# Patient Record
Sex: MALE | Race: BLACK OR AFRICAN AMERICAN | Employment: OTHER | URBAN - METROPOLITAN AREA
[De-identification: names, ages, dates, MRNs, and addresses within clinical notes are randomized per-mention and may not be internally consistent; named-entity substitution may affect disease eponyms.]

---

## 2017-02-10 ENCOUNTER — HOSPITAL ENCOUNTER (OUTPATIENT)
Age: 52
Discharge: HOME | End: 2017-02-10
Payer: MEDICARE

## 2017-02-10 ENCOUNTER — HOSPITAL ENCOUNTER (OUTPATIENT)
Dept: GENERAL RADIOLOGY | Age: 52
Discharge: HOME | End: 2017-02-10
Payer: MEDICARE

## 2017-02-10 ENCOUNTER — OFFICE VISIT (OUTPATIENT)
Dept: INTERNAL MEDICINE | Facility: CLINIC | Age: 52
End: 2017-02-10

## 2017-02-10 VITALS
HEART RATE: 62 BPM | WEIGHT: 204 LBS | DIASTOLIC BLOOD PRESSURE: 96 MMHG | BODY MASS INDEX: 28.56 KG/M2 | SYSTOLIC BLOOD PRESSURE: 156 MMHG | HEIGHT: 71 IN

## 2017-02-10 DIAGNOSIS — I10 ESSENTIAL HYPERTENSION: ICD-10-CM

## 2017-02-10 DIAGNOSIS — F41.9 ANXIETY: ICD-10-CM

## 2017-02-10 DIAGNOSIS — I10 ESSENTIAL HYPERTENSION: Primary | ICD-10-CM

## 2017-02-10 DIAGNOSIS — M17.2 POST-TRAUMATIC OSTEOARTHRITIS OF BOTH KNEES: ICD-10-CM

## 2017-02-10 PROCEDURE — 71020 XR CHEST STANDARD TWO VW: CPT | Performed by: RADIOLOGY

## 2017-02-10 PROCEDURE — 71020 XR CHEST STANDARD TWO VW: CPT

## 2017-02-10 PROCEDURE — G8427 DOCREV CUR MEDS BY ELIG CLIN: HCPCS | Performed by: INTERNAL MEDICINE

## 2017-02-10 PROCEDURE — 4004F PT TOBACCO SCREEN RCVD TLK: CPT | Performed by: INTERNAL MEDICINE

## 2017-02-10 PROCEDURE — 99213 OFFICE O/P EST LOW 20 MIN: CPT | Performed by: INTERNAL MEDICINE

## 2017-02-10 PROCEDURE — G8484 FLU IMMUNIZE NO ADMIN: HCPCS | Performed by: INTERNAL MEDICINE

## 2017-02-10 PROCEDURE — G0444 DEPRESSION SCREEN ANNUAL: HCPCS | Performed by: INTERNAL MEDICINE

## 2017-02-10 PROCEDURE — G8419 CALC BMI OUT NRM PARAM NOF/U: HCPCS | Performed by: INTERNAL MEDICINE

## 2017-02-10 RX ORDER — TRIAMCINOLONE ACETONIDE 0.25 MG/G
CREAM TOPICAL
Qty: 60 G | Refills: 2 | Status: SHIPPED | OUTPATIENT
Start: 2017-02-10 | End: 2017-05-12 | Stop reason: SDUPTHER

## 2017-02-10 RX ORDER — HYDROXYZINE HYDROCHLORIDE 25 MG/1
TABLET, FILM COATED ORAL
Qty: 40 TABLET | Refills: 11 | Status: SHIPPED | OUTPATIENT
Start: 2017-02-10 | End: 2017-06-28 | Stop reason: ALTCHOICE

## 2017-02-10 RX ORDER — BLOOD PRESSURE TEST KIT
KIT MISCELLANEOUS
Qty: 1 KIT | Refills: 0 | Status: SHIPPED | OUTPATIENT
Start: 2017-02-10 | End: 2017-05-12 | Stop reason: SDUPTHER

## 2017-02-10 RX ORDER — TRAMADOL HYDROCHLORIDE 50 MG/1
TABLET ORAL
Qty: 50 TABLET | Refills: 2 | Status: SHIPPED | OUTPATIENT
Start: 2017-02-10 | End: 2017-05-12 | Stop reason: SDUPTHER

## 2017-02-10 RX ORDER — AMLODIPINE BESYLATE 10 MG/1
TABLET ORAL
Qty: 30 TABLET | Refills: 11 | Status: SHIPPED | OUTPATIENT
Start: 2017-02-10 | End: 2017-06-28 | Stop reason: ALTCHOICE

## 2017-02-10 RX ORDER — HYDROCHLOROTHIAZIDE 25 MG/1
TABLET ORAL
Qty: 30 TABLET | Refills: 11 | Status: SHIPPED | OUTPATIENT
Start: 2017-02-10 | End: 2017-06-28 | Stop reason: ALTCHOICE

## 2017-02-10 RX ORDER — MULTIVITAMIN/IRON/FOLIC ACID 18MG-0.4MG
TABLET ORAL
Qty: 30 TABLET | Refills: 11 | Status: SHIPPED | OUTPATIENT
Start: 2017-02-10 | End: 2017-08-15 | Stop reason: SDUPTHER

## 2017-02-10 ASSESSMENT — ENCOUNTER SYMPTOMS
RESPIRATORY NEGATIVE: 1
GASTROINTESTINAL NEGATIVE: 1
BACK PAIN: 1
ALLERGIC/IMMUNOLOGIC NEGATIVE: 1
EYES NEGATIVE: 1

## 2017-02-10 ASSESSMENT — PATIENT HEALTH QUESTIONNAIRE - PHQ9
5. POOR APPETITE OR OVEREATING: 0
3. TROUBLE FALLING OR STAYING ASLEEP: 1
2. FEELING DOWN, DEPRESSED OR HOPELESS: 2
10. IF YOU CHECKED OFF ANY PROBLEMS, HOW DIFFICULT HAVE THESE PROBLEMS MADE IT FOR YOU TO DO YOUR WORK, TAKE CARE OF THINGS AT HOME, OR GET ALONG WITH OTHER PEOPLE: 1
1. LITTLE INTEREST OR PLEASURE IN DOING THINGS: 1
9. THOUGHTS THAT YOU WOULD BE BETTER OFF DEAD, OR OF HURTING YOURSELF: 0
SUM OF ALL RESPONSES TO PHQ9 QUESTIONS 1 & 2: 3
6. FEELING BAD ABOUT YOURSELF - OR THAT YOU ARE A FAILURE OR HAVE LET YOURSELF OR YOUR FAMILY DOWN: 1
SUM OF ALL RESPONSES TO PHQ QUESTIONS 1-9: 5
4. FEELING TIRED OR HAVING LITTLE ENERGY: 0
7. TROUBLE CONCENTRATING ON THINGS, SUCH AS READING THE NEWSPAPER OR WATCHING TELEVISION: 0

## 2017-02-16 RX ORDER — LEVOCETIRIZINE DIHYDROCHLORIDE 5 MG/1
TABLET, FILM COATED ORAL
Qty: 20 TABLET | Refills: 0 | Status: SHIPPED | OUTPATIENT
Start: 2017-02-16 | End: 2017-06-28 | Stop reason: ALTCHOICE

## 2017-05-12 ENCOUNTER — OFFICE VISIT (OUTPATIENT)
Dept: INTERNAL MEDICINE | Age: 52
End: 2017-05-12
Payer: COMMERCIAL

## 2017-05-12 VITALS
SYSTOLIC BLOOD PRESSURE: 147 MMHG | BODY MASS INDEX: 28.31 KG/M2 | DIASTOLIC BLOOD PRESSURE: 88 MMHG | WEIGHT: 203 LBS | RESPIRATION RATE: 18 BRPM | HEART RATE: 77 BPM

## 2017-05-12 DIAGNOSIS — R21 PENILE RASH: ICD-10-CM

## 2017-05-12 DIAGNOSIS — I10 ESSENTIAL HYPERTENSION: Primary | ICD-10-CM

## 2017-05-12 DIAGNOSIS — M17.2 POST-TRAUMATIC OSTEOARTHRITIS OF BOTH KNEES: ICD-10-CM

## 2017-05-12 PROCEDURE — 99213 OFFICE O/P EST LOW 20 MIN: CPT | Performed by: INTERNAL MEDICINE

## 2017-05-12 RX ORDER — TRIAMCINOLONE ACETONIDE 0.25 MG/G
CREAM TOPICAL
Qty: 60 G | Refills: 2 | Status: SHIPPED | OUTPATIENT
Start: 2017-05-12 | End: 2017-06-28 | Stop reason: ALTCHOICE

## 2017-05-12 RX ORDER — CLOTRIMAZOLE AND BETAMETHASONE DIPROPIONATE 10; .64 MG/G; MG/G
CREAM TOPICAL
Qty: 45 G | Refills: 0 | Status: SHIPPED | OUTPATIENT
Start: 2017-05-12 | End: 2017-06-28 | Stop reason: ALTCHOICE

## 2017-05-12 RX ORDER — LISINOPRIL 5 MG/1
5 TABLET ORAL DAILY
Qty: 30 TABLET | Refills: 11 | Status: SHIPPED | OUTPATIENT
Start: 2017-05-12 | End: 2017-06-28 | Stop reason: ALTCHOICE

## 2017-05-12 RX ORDER — BLOOD PRESSURE TEST KIT
KIT MISCELLANEOUS
Qty: 1 KIT | Refills: 0 | Status: SHIPPED | OUTPATIENT
Start: 2017-05-12 | End: 2018-08-31

## 2017-05-12 RX ORDER — TRAMADOL HYDROCHLORIDE 50 MG/1
TABLET ORAL
Qty: 50 TABLET | Refills: 2 | Status: SHIPPED | OUTPATIENT
Start: 2017-05-12 | End: 2017-06-28 | Stop reason: ALTCHOICE

## 2017-05-12 ASSESSMENT — ENCOUNTER SYMPTOMS
EYES NEGATIVE: 1
RESPIRATORY NEGATIVE: 1
GASTROINTESTINAL NEGATIVE: 1
ALLERGIC/IMMUNOLOGIC NEGATIVE: 1

## 2017-05-18 ENCOUNTER — TELEPHONE (OUTPATIENT)
Dept: INTERNAL MEDICINE | Age: 52
End: 2017-05-18

## 2017-06-28 ENCOUNTER — OFFICE VISIT (OUTPATIENT)
Dept: INTERNAL MEDICINE | Age: 52
End: 2017-06-28
Payer: COMMERCIAL

## 2017-06-28 VITALS
WEIGHT: 189 LBS | HEART RATE: 85 BPM | BODY MASS INDEX: 26.46 KG/M2 | SYSTOLIC BLOOD PRESSURE: 139 MMHG | DIASTOLIC BLOOD PRESSURE: 85 MMHG | HEIGHT: 71 IN | RESPIRATION RATE: 18 BRPM

## 2017-06-28 DIAGNOSIS — B17.9 ACUTE HEPATITIS: ICD-10-CM

## 2017-06-28 DIAGNOSIS — I82.0 HEPATIC VEIN THROMBOSIS (HCC): ICD-10-CM

## 2017-06-28 DIAGNOSIS — I10 ESSENTIAL HYPERTENSION: Primary | ICD-10-CM

## 2017-06-28 PROCEDURE — 99214 OFFICE O/P EST MOD 30 MIN: CPT | Performed by: INTERNAL MEDICINE

## 2017-06-28 RX ORDER — OMEPRAZOLE 20 MG/1
20 CAPSULE, DELAYED RELEASE ORAL DAILY
COMMUNITY
Start: 2017-06-21 | End: 2017-07-24 | Stop reason: SDUPTHER

## 2017-06-28 RX ORDER — LANOLIN ALCOHOL/MO/W.PET/CERES
100 CREAM (GRAM) TOPICAL DAILY
COMMUNITY
End: 2017-07-24 | Stop reason: SDUPTHER

## 2017-06-28 RX ORDER — FOLIC ACID 1 MG/1
1 TABLET ORAL DAILY
COMMUNITY
Start: 2017-06-21 | End: 2017-07-24 | Stop reason: SDUPTHER

## 2017-06-28 RX ORDER — TRIAMCINOLONE ACETONIDE 0.25 MG/G
CREAM TOPICAL
COMMUNITY
Start: 2017-03-24 | End: 2017-08-15 | Stop reason: SDUPTHER

## 2017-06-28 RX ORDER — WARFARIN SODIUM 2 MG/1
TABLET ORAL
COMMUNITY
Start: 2017-06-21 | End: 2017-07-24 | Stop reason: SDUPTHER

## 2017-06-28 ASSESSMENT — ENCOUNTER SYMPTOMS
GASTROINTESTINAL NEGATIVE: 1
ALLERGIC/IMMUNOLOGIC NEGATIVE: 1
SHORTNESS OF BREATH: 0
EYES NEGATIVE: 1

## 2017-07-21 ENCOUNTER — TELEPHONE (OUTPATIENT)
Dept: INPATIENT UNIT | Age: 52
End: 2017-07-21

## 2017-07-24 ENCOUNTER — TELEPHONE (OUTPATIENT)
Dept: INTERNAL MEDICINE | Age: 52
End: 2017-07-24

## 2017-07-24 RX ORDER — LANOLIN ALCOHOL/MO/W.PET/CERES
100 CREAM (GRAM) TOPICAL DAILY
Qty: 30 TABLET | Refills: 5 | Status: SHIPPED | OUTPATIENT
Start: 2017-07-24 | End: 2017-11-14 | Stop reason: SDUPTHER

## 2017-07-24 RX ORDER — FOLIC ACID 1 MG/1
1000 TABLET ORAL DAILY
Qty: 30 TABLET | Refills: 5 | Status: SHIPPED | OUTPATIENT
Start: 2017-07-24 | End: 2017-11-14 | Stop reason: SDUPTHER

## 2017-07-24 RX ORDER — WARFARIN SODIUM 2 MG/1
TABLET ORAL
Qty: 30 TABLET | Refills: 2 | Status: SHIPPED | OUTPATIENT
Start: 2017-07-24 | End: 2017-11-14 | Stop reason: ALTCHOICE

## 2017-07-24 RX ORDER — CLONIDINE HYDROCHLORIDE 0.2 MG/1
0.2 TABLET ORAL 2 TIMES DAILY
Qty: 60 TABLET | Refills: 3 | Status: SHIPPED | OUTPATIENT
Start: 2017-07-24 | End: 2017-08-15 | Stop reason: CLARIF

## 2017-07-24 RX ORDER — OMEPRAZOLE 20 MG/1
20 CAPSULE, DELAYED RELEASE ORAL DAILY
Qty: 30 CAPSULE | Refills: 5 | Status: SHIPPED | OUTPATIENT
Start: 2017-07-24 | End: 2017-11-14 | Stop reason: SDUPTHER

## 2017-07-26 ENCOUNTER — TELEPHONE (OUTPATIENT)
Dept: INTERNAL MEDICINE | Age: 52
End: 2017-07-26

## 2017-07-26 ENCOUNTER — HOSPITAL ENCOUNTER (OUTPATIENT)
Age: 52
Discharge: HOME | End: 2017-07-26
Payer: COMMERCIAL

## 2017-07-26 ENCOUNTER — HOSPITAL ENCOUNTER (OUTPATIENT)
Dept: GENERAL RADIOLOGY | Age: 52
Discharge: HOME | End: 2017-07-26
Payer: COMMERCIAL

## 2017-07-26 DIAGNOSIS — I82.0 HEPATIC VEIN THROMBOSIS (HCC): ICD-10-CM

## 2017-07-26 DIAGNOSIS — I10 ESSENTIAL HYPERTENSION: ICD-10-CM

## 2017-07-26 DIAGNOSIS — R91.1 PULMONARY NODULE: Primary | ICD-10-CM

## 2017-07-26 DIAGNOSIS — B17.9 ACUTE HEPATITIS: ICD-10-CM

## 2017-07-26 LAB
ABSOLUTE EOS #: 0.3 K/UL (ref 0–0.4)
ABSOLUTE LYMPH #: 3 K/UL (ref 1–4.8)
ABSOLUTE MONO #: 0.5 K/UL (ref 0.1–1.2)
ALBUMIN SERPL-MCNC: 4 G/DL (ref 3.5–5.2)
ALBUMIN/GLOBULIN RATIO: 0.9 (ref 1–2.5)
ALP BLD-CCNC: 130 U/L (ref 40–129)
ALP BLD-CCNC: 130 U/L (ref 40–129)
ALT SERPL-CCNC: 52 U/L (ref 5–41)
ALT SERPL-CCNC: 52 U/L (ref 5–41)
ANION GAP SERPL CALCULATED.3IONS-SCNC: 13 MMOL/L (ref 9–17)
ANION GAP SERPL CALCULATED.3IONS-SCNC: 13 MMOL/L (ref 9–17)
AST SERPL-CCNC: 43 U/L
AST SERPL-CCNC: 43 U/L
BASOPHILS # BLD: 1 %
BASOPHILS ABSOLUTE: 0 K/UL (ref 0–0.2)
BILIRUB SERPL-MCNC: 0.55 MG/DL (ref 0.3–1.2)
BUN BLDV-MCNC: 13 MG/DL (ref 6–20)
BUN BLDV-MCNC: 13 MG/DL (ref 6–20)
BUN/CREAT BLD: ABNORMAL (ref 9–20)
BUN/CREAT BLD: NORMAL (ref 9–20)
CALCIUM SERPL-MCNC: 8.9 MG/DL (ref 8.6–10.4)
CALCIUM SERPL-MCNC: 8.9 MG/DL (ref 8.6–10.4)
CHLORIDE BLD-SCNC: 99 MMOL/L (ref 98–107)
CHLORIDE BLD-SCNC: 99 MMOL/L (ref 98–107)
CO2: 23 MMOL/L (ref 20–31)
CO2: 23 MMOL/L (ref 20–31)
CREAT SERPL-MCNC: 0.86 MG/DL (ref 0.7–1.2)
CREAT SERPL-MCNC: 0.86 MG/DL (ref 0.7–1.2)
DIFFERENTIAL TYPE: ABNORMAL
EOSINOPHILS RELATIVE PERCENT: 4 %
GFR AFRICAN AMERICAN: >60 ML/MIN
GFR AFRICAN AMERICAN: >60 ML/MIN
GFR NON-AFRICAN AMERICAN: >60 ML/MIN
GFR NON-AFRICAN AMERICAN: >60 ML/MIN
GFR SERPL CREATININE-BSD FRML MDRD: ABNORMAL ML/MIN/{1.73_M2}
GFR SERPL CREATININE-BSD FRML MDRD: ABNORMAL ML/MIN/{1.73_M2}
GFR SERPL CREATININE-BSD FRML MDRD: NORMAL ML/MIN/{1.73_M2}
GFR SERPL CREATININE-BSD FRML MDRD: NORMAL ML/MIN/{1.73_M2}
GLUCOSE BLD-MCNC: 84 MG/DL (ref 70–99)
GLUCOSE BLD-MCNC: 84 MG/DL (ref 70–99)
HCT VFR BLD CALC: 37.3 % (ref 41–53)
HEMOGLOBIN: 12.6 G/DL (ref 13.5–17.5)
LYMPHOCYTES # BLD: 38 %
MCH RBC QN AUTO: 27.4 PG (ref 26–34)
MCHC RBC AUTO-ENTMCNC: 33.7 G/DL (ref 31–37)
MCV RBC AUTO: 81.3 FL (ref 80–100)
MONOCYTES # BLD: 7 %
PDW BLD-RTO: 15.3 % (ref 12.5–15.4)
PLATELET # BLD: 307 K/UL (ref 140–450)
PLATELET ESTIMATE: ABNORMAL
PMV BLD AUTO: 7.7 FL (ref 6–12)
POTASSIUM SERPL-SCNC: 4 MMOL/L (ref 3.7–5.3)
POTASSIUM SERPL-SCNC: 4 MMOL/L (ref 3.7–5.3)
RBC # BLD: 4.59 M/UL (ref 4.5–5.9)
RBC # BLD: ABNORMAL 10*6/UL
SEG NEUTROPHILS: 50 %
SEGMENTED NEUTROPHILS ABSOLUTE COUNT: 4 K/UL (ref 1.8–7.7)
SODIUM BLD-SCNC: 135 MMOL/L (ref 135–144)
SODIUM BLD-SCNC: 135 MMOL/L (ref 135–144)
TOTAL PROTEIN: 8.4 G/DL (ref 6.4–8.3)
WBC # BLD: 7.8 K/UL (ref 3.5–11)
WBC # BLD: ABNORMAL 10*3/UL

## 2017-07-26 PROCEDURE — 71020 XR CHEST STANDARD TWO VW: CPT

## 2017-07-26 PROCEDURE — 85025 COMPLETE CBC W/AUTO DIFF WBC: CPT

## 2017-07-26 PROCEDURE — 84460 ALANINE AMINO (ALT) (SGPT): CPT

## 2017-07-26 PROCEDURE — 36415 COLL VENOUS BLD VENIPUNCTURE: CPT

## 2017-07-26 PROCEDURE — 84075 ASSAY ALKALINE PHOSPHATASE: CPT

## 2017-07-26 PROCEDURE — 84450 TRANSFERASE (AST) (SGOT): CPT

## 2017-07-26 PROCEDURE — 80053 COMPREHEN METABOLIC PANEL: CPT

## 2017-07-26 PROCEDURE — 80048 BASIC METABOLIC PNL TOTAL CA: CPT

## 2017-07-28 ENCOUNTER — TELEPHONE (OUTPATIENT)
Dept: INTERNAL MEDICINE | Age: 52
End: 2017-07-28

## 2017-08-03 LAB
INR BLD: 1.69 (ref 0.86–1.15)
PROTHROMBIN TIME: 18.2 SECONDS (ref 9.3–12.3)

## 2017-08-07 ENCOUNTER — HOSPITAL ENCOUNTER (OUTPATIENT)
Dept: CT IMAGING | Age: 52
Discharge: HOME | End: 2017-08-07

## 2017-08-07 DIAGNOSIS — K75.0 LIVER ABSCESS: ICD-10-CM

## 2017-08-07 DIAGNOSIS — R91.1 PULMONARY NODULE: ICD-10-CM

## 2017-08-07 PROCEDURE — 6360000004 HC RX CONTRAST MEDICATION: Performed by: PHYSICIAN ASSISTANT

## 2017-08-07 PROCEDURE — 74170 CT ABD WO CNTRST FLWD CNTRST: CPT

## 2017-08-07 PROCEDURE — 71250 CT THORAX DX C-: CPT

## 2017-08-07 RX ADMIN — IOVERSOL 100 ML: 741 INJECTION INTRA-ARTERIAL; INTRAVENOUS at 13:35

## 2017-08-11 LAB
INR BLD: 2.42 (ref 0.86–1.15)
PROTHROMBIN TIME: 26.4 SECONDS (ref 9.3–12.3)

## 2017-08-15 ENCOUNTER — OFFICE VISIT (OUTPATIENT)
Dept: INTERNAL MEDICINE | Age: 52
End: 2017-08-15
Payer: COMMERCIAL

## 2017-08-15 VITALS
BODY MASS INDEX: 26.93 KG/M2 | HEIGHT: 72 IN | WEIGHT: 198.8 LBS | SYSTOLIC BLOOD PRESSURE: 138 MMHG | DIASTOLIC BLOOD PRESSURE: 83 MMHG | HEART RATE: 73 BPM

## 2017-08-15 DIAGNOSIS — I81 PORTAL VEIN THROMBOSIS: ICD-10-CM

## 2017-08-15 DIAGNOSIS — Z23 NEED FOR PNEUMOCOCCAL VACCINATION: ICD-10-CM

## 2017-08-15 DIAGNOSIS — I10 ESSENTIAL HYPERTENSION: Primary | ICD-10-CM

## 2017-08-15 PROCEDURE — G0009 ADMIN PNEUMOCOCCAL VACCINE: HCPCS | Performed by: INTERNAL MEDICINE

## 2017-08-15 PROCEDURE — 99213 OFFICE O/P EST LOW 20 MIN: CPT | Performed by: INTERNAL MEDICINE

## 2017-08-15 PROCEDURE — 90732 PPSV23 VACC 2 YRS+ SUBQ/IM: CPT | Performed by: INTERNAL MEDICINE

## 2017-08-15 RX ORDER — MULTIVITAMIN/IRON/FOLIC ACID 18MG-0.4MG
TABLET ORAL
Qty: 30 TABLET | Refills: 11 | Status: SHIPPED | OUTPATIENT
Start: 2017-08-15 | End: 2017-11-14 | Stop reason: SDUPTHER

## 2017-08-15 RX ORDER — TRAMADOL HYDROCHLORIDE 50 MG/1
50 TABLET ORAL
COMMUNITY
Start: 2017-07-21 | End: 2017-08-15 | Stop reason: SDUPTHER

## 2017-08-15 RX ORDER — TRIAMCINOLONE ACETONIDE 0.25 MG/G
CREAM TOPICAL
Qty: 60 G | Refills: 2 | Status: SHIPPED | OUTPATIENT
Start: 2017-08-15 | End: 2017-11-14 | Stop reason: SDUPTHER

## 2017-08-15 RX ORDER — TRAMADOL HYDROCHLORIDE 50 MG/1
50 TABLET ORAL EVERY 8 HOURS PRN
Qty: 30 TABLET | Refills: 0 | Status: SHIPPED | OUTPATIENT
Start: 2017-08-15 | End: 2017-10-21 | Stop reason: SDUPTHER

## 2017-08-15 ASSESSMENT — ENCOUNTER SYMPTOMS
EYES NEGATIVE: 1
RESPIRATORY NEGATIVE: 1
ALLERGIC/IMMUNOLOGIC NEGATIVE: 1
GASTROINTESTINAL NEGATIVE: 1

## 2017-08-25 LAB
INR BLD: 2.16 (ref 0.86–1.15)
PROTHROMBIN TIME: 23.5 SECONDS (ref 9.3–12.3)

## 2017-10-02 ENCOUNTER — TELEPHONE (OUTPATIENT)
Dept: INTERNAL MEDICINE | Age: 52
End: 2017-10-02

## 2017-10-13 LAB
INR BLD: 2.49 (ref 0.86–1.15)
PROTHROMBIN TIME: 27.1 SECONDS (ref 9.3–12.3)

## 2017-10-23 RX ORDER — TRAMADOL HYDROCHLORIDE 50 MG/1
TABLET ORAL
Qty: 30 TABLET | Refills: 0 | OUTPATIENT
Start: 2017-10-23 | End: 2017-11-14 | Stop reason: SDUPTHER

## 2017-10-23 NOTE — TELEPHONE ENCOUNTER
PC to [facility]--left VM for Tramadol 50 mg #30 with no refills. Take 1 tablet q 8 hours prn pain. Will cause drowsiness. Do not drive if taking.

## 2017-11-14 ENCOUNTER — OFFICE VISIT (OUTPATIENT)
Dept: INTERNAL MEDICINE | Age: 52
End: 2017-11-14
Payer: COMMERCIAL

## 2017-11-14 VITALS
DIASTOLIC BLOOD PRESSURE: 97 MMHG | HEART RATE: 77 BPM | SYSTOLIC BLOOD PRESSURE: 165 MMHG | BODY MASS INDEX: 28.77 KG/M2 | HEIGHT: 72 IN | WEIGHT: 212.4 LBS

## 2017-11-14 DIAGNOSIS — I10 ESSENTIAL HYPERTENSION: ICD-10-CM

## 2017-11-14 PROCEDURE — G8484 FLU IMMUNIZE NO ADMIN: HCPCS | Performed by: INTERNAL MEDICINE

## 2017-11-14 PROCEDURE — 4004F PT TOBACCO SCREEN RCVD TLK: CPT | Performed by: INTERNAL MEDICINE

## 2017-11-14 PROCEDURE — G8417 CALC BMI ABV UP PARAM F/U: HCPCS | Performed by: INTERNAL MEDICINE

## 2017-11-14 PROCEDURE — 99214 OFFICE O/P EST MOD 30 MIN: CPT | Performed by: INTERNAL MEDICINE

## 2017-11-14 PROCEDURE — G8427 DOCREV CUR MEDS BY ELIG CLIN: HCPCS | Performed by: INTERNAL MEDICINE

## 2017-11-14 RX ORDER — LANOLIN ALCOHOL/MO/W.PET/CERES
100 CREAM (GRAM) TOPICAL DAILY
Qty: 30 TABLET | Refills: 5 | Status: SHIPPED | OUTPATIENT
Start: 2017-11-14 | End: 2018-04-30 | Stop reason: SDUPTHER

## 2017-11-14 RX ORDER — OMEPRAZOLE 20 MG/1
20 CAPSULE, DELAYED RELEASE ORAL DAILY
Qty: 30 CAPSULE | Refills: 5 | Status: SHIPPED | OUTPATIENT
Start: 2017-11-14 | End: 2018-04-30 | Stop reason: SDUPTHER

## 2017-11-14 RX ORDER — TRAMADOL HYDROCHLORIDE 50 MG/1
TABLET ORAL
Qty: 30 TABLET | Refills: 0 | Status: SHIPPED | OUTPATIENT
Start: 2017-11-14 | End: 2017-12-06

## 2017-11-14 RX ORDER — TRIAMCINOLONE ACETONIDE 0.25 MG/G
CREAM TOPICAL
Qty: 60 G | Refills: 2 | Status: SHIPPED | OUTPATIENT
Start: 2017-11-14 | End: 2018-04-30 | Stop reason: SDUPTHER

## 2017-11-14 RX ORDER — FOLIC ACID 1 MG/1
1000 TABLET ORAL DAILY
Qty: 30 TABLET | Refills: 5 | Status: SHIPPED | OUTPATIENT
Start: 2017-11-14 | End: 2018-04-30 | Stop reason: SDUPTHER

## 2017-11-14 RX ORDER — MULTIVITAMIN/IRON/FOLIC ACID 18MG-0.4MG
TABLET ORAL
Qty: 30 TABLET | Refills: 11 | Status: SHIPPED | OUTPATIENT
Start: 2017-11-14 | End: 2020-05-12

## 2017-11-14 ASSESSMENT — ENCOUNTER SYMPTOMS
EYES NEGATIVE: 1
BACK PAIN: 1
RESPIRATORY NEGATIVE: 1
ALLERGIC/IMMUNOLOGIC NEGATIVE: 1
GASTROINTESTINAL NEGATIVE: 1

## 2017-11-14 NOTE — PATIENT INSTRUCTIONS
Printed script for Tramadol signed and given to pt. Return To Clinic 12/6/2017. After Visit Summary  given and reviewed. --MA    It is very important for your care that you keep your appointment. If for some reason you are unable to keep your appointment it is equally important that you call our office at [phone] to cancel your appointment and reschedule. Failure to do so may result in your termination from our practice.

## 2017-11-14 NOTE — PROGRESS NOTES
Visit Information    Have you changed or started any medications since your last visit including any over-the-counter medicines, vitamins, or herbal medicines? no   Are you having any side effects from any of your medications? -  no  Have you stopped taking any of your medications? Is so, why? -  no    Have you seen any other physician or provider since your last visit? Yes - Records Obtained  Have you had any other diagnostic tests since your last visit? Yes - Records Obtained  Have you been seen in the emergency room and/or had an admission to a hospital since we last saw you? No  Have you had your routine dental cleaning in the past 6 months? no    Have you activated your [employer] account? If not, what are your barriers?  Yes     Patient Care Team:  Jennifer Puri MD as PCP - General (Internal Medicine)  Keon Gayle MD as Consulting Physician (Vascular Surgery)  Evelina Bhatia MD as Surgeon (General Surgery: [facility])  Bhaskar Nicole MD as Consulting Physician (Infectious Diseases)    Medical History Review  Past Medical, Family, and Social History reviewed and does contribute to the patient presenting condition    Health Maintenance   Topic Date Due    HIV screen  11/27/1982    DTaP/Tdap/Td vaccine (1 - Tdap) 11/27/1986    Flu vaccine (1) 09/01/2017    Lipid screen  07/22/2021    Pneumococcal med risk  Completed

## 2017-11-14 NOTE — PROGRESS NOTES
Subjective:      Patient ID: Zayda Sosa Sr. is a 54 y.o. male. Ongoing arthralgias. Tolerating minimal/limited work. Off coumadin. Review of Systems   Constitutional: Negative. HENT: Negative. Eyes: Negative. Respiratory: Negative. Cardiovascular: Negative. Gastrointestinal: Negative. Endocrine: Negative. Genitourinary: Negative. Musculoskeletal: Positive for arthralgias, back pain, gait problem, joint swelling and myalgias. Skin: Negative. Allergic/Immunologic: Negative. Hematological: Negative. Psychiatric/Behavioral: Negative. Objective:   Physical Exam   Constitutional: He is oriented to person, place, and time. He appears well-developed and well-nourished. No distress. HENT:   Head: Normocephalic and atraumatic. Eyes: Conjunctivae and EOM are normal. Pupils are equal, round, and reactive to light. Neck: Normal range of motion. Neck supple. Abdominal: Soft. Bowel sounds are normal.   Musculoskeletal: He exhibits tenderness (paralumbar bilat. SLR postitive bilat at 60 degrees. ). Neurological: He is alert and oriented to person, place, and time. He has normal reflexes. Skin: Skin is warm and dry. He is not diaphoretic. Assessment:      Sp portal vein thrombosis  DJD of knees and hips.    Chronic low back pain  May be able to tolerate minimal sedentary work  Patient Active Problem List   Diagnosis    Hypertension    Sickle cell trait ([facility] [address]    Osteoarthritis of knee    Tibialis posterior tendonitis    Portal vein thrombosis           Plan:      Paperwork completed  Med list  Ret 3 mo

## 2017-12-06 ENCOUNTER — HOSPITAL ENCOUNTER (OUTPATIENT)
Age: 52
Setting detail: SPECIMEN
Discharge: HOME | End: 2017-12-06
Payer: COMMERCIAL

## 2017-12-06 ENCOUNTER — OFFICE VISIT (OUTPATIENT)
Dept: INTERNAL MEDICINE | Age: 52
End: 2017-12-06
Payer: COMMERCIAL

## 2017-12-06 VITALS
SYSTOLIC BLOOD PRESSURE: 130 MMHG | HEART RATE: 78 BPM | HEIGHT: 72 IN | DIASTOLIC BLOOD PRESSURE: 70 MMHG | WEIGHT: 217.4 LBS | BODY MASS INDEX: 29.45 KG/M2

## 2017-12-06 DIAGNOSIS — Z72.89 OTHER PROBLEMS RELATED TO LIFESTYLE: ICD-10-CM

## 2017-12-06 DIAGNOSIS — M17.0 PRIMARY OSTEOARTHRITIS OF BOTH KNEES: Primary | ICD-10-CM

## 2017-12-06 DIAGNOSIS — Z00.00 ROUTINE GENERAL MEDICAL EXAMINATION AT A HEALTH CARE FACILITY: ICD-10-CM

## 2017-12-06 PROCEDURE — G0438 PPPS, INITIAL VISIT: HCPCS | Performed by: INTERNAL MEDICINE

## 2017-12-06 RX ORDER — TRAMADOL HYDROCHLORIDE 50 MG/1
50 TABLET ORAL DAILY PRN
Qty: 30 TABLET | Refills: 0 | Status: SHIPPED | OUTPATIENT
Start: 2017-12-06 | End: 2018-04-19 | Stop reason: SDUPTHER

## 2017-12-06 ASSESSMENT — LIFESTYLE VARIABLES
HOW OFTEN DURING THE LAST YEAR HAVE YOU FOUND THAT YOU WERE NOT ABLE TO STOP DRINKING ONCE YOU HAD STARTED: 0
HAS A RELATIVE, FRIEND, DOCTOR, OR ANOTHER HEALTH PROFESSIONAL EXPRESSED CONCERN ABOUT YOUR DRINKING OR SUGGESTED YOU CUT DOWN: 0
AUDIT-C TOTAL SCORE: 3
HAVE YOU OR SOMEONE ELSE BEEN INJURED AS A RESULT OF YOUR DRINKING: 0
AUDIT TOTAL SCORE: 3
HOW OFTEN DURING THE LAST YEAR HAVE YOU NEEDED AN ALCOHOLIC DRINK FIRST THING IN THE MORNING TO GET YOURSELF GOING AFTER A NIGHT OF HEAVY DRINKING: 0
HOW MANY STANDARD DRINKS CONTAINING ALCOHOL DO YOU HAVE ON A TYPICAL DAY: 0
HOW OFTEN DO YOU HAVE A DRINK CONTAINING ALCOHOL: 2
HOW OFTEN DURING THE LAST YEAR HAVE YOU FAILED TO DO WHAT WAS NORMALLY EXPECTED FROM YOU BECAUSE OF DRINKING: 0
HOW OFTEN DURING THE LAST YEAR HAVE YOU HAD A FEELING OF GUILT OR REMORSE AFTER DRINKING: 0
HOW OFTEN DO YOU HAVE SIX OR MORE DRINKS ON ONE OCCASION: 1
HOW OFTEN DURING THE LAST YEAR HAVE YOU BEEN UNABLE TO REMEMBER WHAT HAPPENED THE NIGHT BEFORE BECAUSE YOU HAD BEEN DRINKING: 0

## 2017-12-06 ASSESSMENT — PATIENT HEALTH QUESTIONNAIRE - PHQ9: SUM OF ALL RESPONSES TO PHQ QUESTIONS 1-9: 0

## 2017-12-06 ASSESSMENT — ANXIETY QUESTIONNAIRES: GAD7 TOTAL SCORE: 1

## 2017-12-06 NOTE — PROGRESS NOTES
Chace Mark MD as PCP - General (Internal Medicine)  Enedelia Hudson MD as Consulting Physician (Vascular Surgery)  Yosi Leo MD as Surgeon (General Surgery: [facility])  Paula Tinoco MD as Consulting Physician (Infectious Diseases)    Wt Readings from Last 3 Encounters:   12/06/17 217 lb 6.4 oz (98.6 kg)   11/14/17 212 lb 6.4 oz (96.3 kg)   08/15/17 198 lb 12.8 oz (90.2 kg)     Vitals:    12/06/17 1350   BP: (!) 157/93   Site: Right Arm   Position: Sitting   Cuff Size: Medium Adult   Pulse: 78   Weight: 217 lb 6.4 oz (98.6 kg)   Height: 5' 11.5\" (1.816 m)       General Appearance: alert and oriented to person, place and time, well developed and well- nourished, in no acute distress  Skin: warm and dry, no rash or erythema  Head: normocephalic and atraumatic  Eyes: pupils equal, round, and reactive to light, extraocular eye movements intact, conjunctivae normal  ENT: tympanic membrane, external ear and ear canal normal bilaterally, nose without deformity, nasal mucosa and turbinates normal without polyps  Neck: supple and non-tender without mass, no thyromegaly or thyroid nodules, no cervical lymphadenopathy  Pulmonary/Chest: clear to auscultation bilaterally- no wheezes, rales or rhonchi, normal air movement, no respiratory distress  Cardiovascular: normal rate, regular rhythm, normal S1 and S2, no murmurs, rubs, clicks, or gallops, distal pulses intact, no carotid bruits  Abdomen: soft, non-tender, non-distended, normal bowel sounds, no masses or organomegaly  Extremities: no cyanosis, clubbing or edema  Musculoskeletal: normal range of motion, no joint swelling, deformity or tenderness  Neurologic: reflexes normal and symmetric, no cranial nerve deficit, gait, coordination and speech normal    Patient's complete Health Risk Assessment and screening values have been reviewed and are found in Flowsheets.  The following problems were reviewed today and where indicated follow up appointments were

## 2017-12-06 NOTE — PATIENT INSTRUCTIONS
Stopping Smoking: Care Instructions  Your Care Instructions    Cigarette smokers crave the nicotine in cigarettes. Giving it up is much harder than simply changing a habit. Your body has to stop craving the nicotine. It is hard to quit, but you can do it. There are many tools that people use to quit smoking. You may find that combining tools works best for you. There are several steps to quitting. First you get ready to quit. Then you get support to help you. After that, you learn new skills and behaviors to become a nonsmoker. For many people, a necessary step is getting and using medicine. Your doctor will help you set up the plan that best meets your needs. You may want to attend a smoking cessation program to help you quit smoking. When you choose a program, look for one that has proven success. Ask your doctor for ideas. You will greatly increase your chances of success if you take medicine as well as get counseling or join a cessation program.  Some of the changes you feel when you first quit tobacco are uncomfortable. Your body will miss the nicotine at first, and you may feel short-tempered and grumpy. You may have trouble sleeping or concentrating. Medicine can help you deal with these symptoms. You may struggle with changing your smoking habits and rituals. The last step is the tricky one: Be prepared for the smoking urge to continue for a time. This is a lot to deal with, but keep at it. You will feel better. Follow-up care is a key part of your treatment and safety. Be sure to make and go to all appointments, and call your doctor if you are having problems. It's also a good idea to know your test results and keep a list of the medicines you take. How can you care for yourself at home? · Ask your family, friends, and coworkers for support. You have a better chance of quitting if you have help and support.   · Join a support group, such as Nicotine Anonymous, for people who are trying to quit if you smoke again. Make a list of things you learned and think about when you want to try again, such as next week, next month, or next year. Where can you learn more? Go to https://[employer]. org and sign in to your Prior [employer] account. Enter A096 in the [facility] box to learn more about \"Stopping Smoking: Care Instructions. \"     If you do not have an account, please click on the \"Sign Up Now\" link. Current as of: March 20, 2017  Content Version: 11.4  © 7510-9732 [employer]. Care instructions adapted under license by [facility] [address] If you have questions about a medical condition or this instruction, always ask your healthcare professional. [address] any warranty or liability for your use of this information. Personalized Preventive Plan for Fabrice Ribeiro Sr. - 12/6/2017  Medicare offers a range of preventive health benefits. Some of the tests and screenings are paid in full while other may be subject to a deductible, co-insurance, and/or copay. Some of these benefits include a comprehensive review of your medical history including lifestyle, illnesses that may run in your family, and various assessments and screenings as appropriate. After reviewing your medical record and screening and assessments performed today your provider may have ordered immunizations, labs, imaging, and/or referrals for you. A list of these orders (if applicable) as well as your Preventive Care list are included within your After Visit Summary for your review. Other Preventive Recommendations:    · A preventive eye exam performed by an eye specialist is recommended every 1-2 years to screen for glaucoma; cataracts, macular degeneration, and other eye disorders. · A preventive dental visit is recommended every 6 months. · Try to get at least 150 minutes of exercise per week or 10,000 steps per day on a pedometer .   · Order or download the FREE \"Exercise &

## 2017-12-20 ENCOUNTER — TELEPHONE (OUTPATIENT)
Dept: INTERNAL MEDICINE | Age: 52
End: 2017-12-20

## 2017-12-22 ENCOUNTER — HOSPITAL ENCOUNTER (OUTPATIENT)
Age: 52
Setting detail: SPECIMEN
Discharge: HOME | End: 2017-12-22
Payer: COMMERCIAL

## 2017-12-22 DIAGNOSIS — Z72.89 OTHER PROBLEMS RELATED TO LIFESTYLE: ICD-10-CM

## 2017-12-22 LAB — HIV AG/AB: NONREACTIVE

## 2017-12-22 PROCEDURE — 36415 COLL VENOUS BLD VENIPUNCTURE: CPT

## 2017-12-22 PROCEDURE — 87389 HIV-1 AG W/HIV-1&-2 AB AG IA: CPT

## 2017-12-27 DIAGNOSIS — Z12.12 SCREENING FOR COLORECTAL CANCER: Primary | ICD-10-CM

## 2017-12-27 DIAGNOSIS — Z12.11 SCREENING FOR COLORECTAL CANCER: Primary | ICD-10-CM

## 2017-12-27 LAB
CONTROL: ABNORMAL
HEMOCCULT STL QL: POSITIVE

## 2017-12-27 PROCEDURE — 82274 ASSAY TEST FOR BLOOD FECAL: CPT | Performed by: INTERNAL MEDICINE

## 2017-12-28 ENCOUNTER — TELEPHONE (OUTPATIENT)
Dept: INTERNAL MEDICINE | Age: 52
End: 2017-12-28

## 2017-12-28 DIAGNOSIS — R19.5 HEME POSITIVE STOOL: Primary | ICD-10-CM

## 2017-12-28 NOTE — TELEPHONE ENCOUNTER
Referral for GI sent to [facility]  they will call pt for appt, copy of referral with number and address mailed to pt. Pt should call referral number if not heard from within a couple of weeks.

## 2018-02-06 ENCOUNTER — OFFICE VISIT (OUTPATIENT)
Dept: GASTROENTEROLOGY | Age: 53
End: 2018-02-06
Payer: COMMERCIAL

## 2018-02-06 VITALS — SYSTOLIC BLOOD PRESSURE: 148 MMHG | HEART RATE: 75 BPM | OXYGEN SATURATION: 98 % | DIASTOLIC BLOOD PRESSURE: 87 MMHG

## 2018-02-06 DIAGNOSIS — R19.5 POSITIVE FIT (FECAL IMMUNOCHEMICAL TEST): ICD-10-CM

## 2018-02-06 PROCEDURE — 4004F PT TOBACCO SCREEN RCVD TLK: CPT | Performed by: INTERNAL MEDICINE

## 2018-02-06 PROCEDURE — 99203 OFFICE O/P NEW LOW 30 MIN: CPT | Performed by: INTERNAL MEDICINE

## 2018-02-06 PROCEDURE — G8417 CALC BMI ABV UP PARAM F/U: HCPCS | Performed by: INTERNAL MEDICINE

## 2018-02-06 PROCEDURE — G8427 DOCREV CUR MEDS BY ELIG CLIN: HCPCS | Performed by: INTERNAL MEDICINE

## 2018-02-06 PROCEDURE — G8484 FLU IMMUNIZE NO ADMIN: HCPCS | Performed by: INTERNAL MEDICINE

## 2018-02-06 PROCEDURE — 3017F COLORECTAL CA SCREEN DOC REV: CPT | Performed by: INTERNAL MEDICINE

## 2018-02-06 RX ORDER — POLYETHYLENE GLYCOL 3350 17 G/17G
POWDER, FOR SOLUTION ORAL
Qty: 255 G | Refills: 0 | Status: SHIPPED | OUTPATIENT
Start: 2018-02-06 | End: 2018-03-08

## 2018-02-06 ASSESSMENT — ENCOUNTER SYMPTOMS
VOMITING: 0
CHOKING: 0
SHORTNESS OF BREATH: 0
COUGH: 1
NAUSEA: 0
RECTAL PAIN: 0
ANAL BLEEDING: 0
EYES NEGATIVE: 1
ABDOMINAL PAIN: 1
CONSTIPATION: 0
ABDOMINAL DISTENTION: 1
BLOOD IN STOOL: 1
DIARRHEA: 0
BACK PAIN: 0

## 2018-02-06 NOTE — PROGRESS NOTES
INITIAL NOTE    HISTORY OF PRESENT ILLNESS: Mr. Amalia Last Sr. is a 50 y.o. male referred for evaluation of FIT+. He reports intermittent bright red blood per rectum. No abdominal pain. No nausea or vomiting. He was on Coumadin until about 3 months ago. He was treated for 6 months due to portal vein thrombosis. He presented with sepsis at that time. He reports no weight loss. Past Medical, Family, and Social History reviewed and does contribute to the patient presenting condition. Patient's PMH/PSH,SH,PSYCH Hx, MEDs, ALLERGIES, and ROS were all reviewed and updated in the appropriate sections. PAST MEDICAL HISTORY:  Past Medical History:   Diagnosis Date    ADHD (attention deficit hyperactivity disorder)     Hepatic vein thrombosis ([facility] [address] 6/28/2017    Hypertension     Osteoarthritis        Past Surgical History:   Procedure Laterality Date    ACHILLES TENDON SURGERY  2010    right foot       CURRENT MEDICATIONS:    Current Outpatient Prescriptions:     traMADol (ULTRAM) 50 MG tablet, Take 1 tablet by mouth daily as needed for Pain ., Disp: 30 tablet, Rfl: 0    aspirin 81 MG tablet, Take 1 tablet by mouth daily (Patient taking differently: Take 81 mg by mouth 2 times daily ), Disp: 30 tablet, Rfl: 3    Multiple Vitamins-Minerals (CENTROVITE) TABS, TAKE (1) TABLET DAILY. , Disp: 30 tablet, Rfl: 11    triamcinolone (KENALOG) 0.025 % cream, Tid to affected sking, Disp: 60 g, Rfl: 2    omeprazole (PRILOSEC) 20 MG delayed release capsule, Take 1 capsule by mouth daily, Disp: 30 capsule, Rfl: 5    metoprolol tartrate (LOPRESSOR) 25 MG tablet, Take 0.25 tablets by mouth 2 times daily, Disp: 60 tablet, Rfl: 5    folic acid (FOLVITE) 1 MG tablet, Take 1 tablet by mouth daily, Disp: 30 tablet, Rfl: 5    thiamine 100 MG tablet, Take 1 tablet by mouth daily, Disp: 30 tablet, Rfl: 5    Blood Pressure KIT, Use daily, Disp: 1 kit, Rfl: 0    ALLERGIES:   No Known Allergies    FAMILY HISTORY:

## 2018-02-16 ENCOUNTER — OFFICE VISIT (OUTPATIENT)
Dept: INTERNAL MEDICINE | Age: 53
End: 2018-02-16
Payer: COMMERCIAL

## 2018-02-16 VITALS
SYSTOLIC BLOOD PRESSURE: 137 MMHG | HEIGHT: 72 IN | DIASTOLIC BLOOD PRESSURE: 75 MMHG | WEIGHT: 214 LBS | HEART RATE: 80 BPM | BODY MASS INDEX: 28.99 KG/M2

## 2018-02-16 DIAGNOSIS — K21.9 GASTROESOPHAGEAL REFLUX DISEASE WITHOUT ESOPHAGITIS: ICD-10-CM

## 2018-02-16 DIAGNOSIS — R73.03 PRE-DIABETES: Primary | ICD-10-CM

## 2018-02-16 DIAGNOSIS — I10 ESSENTIAL HYPERTENSION: ICD-10-CM

## 2018-02-16 DIAGNOSIS — Z23 NEED FOR PROPHYLACTIC VACCINATION AGAINST DIPHTHERIA-TETANUS-PERTUSSIS (DTP): ICD-10-CM

## 2018-02-16 DIAGNOSIS — M17.0 PRIMARY OSTEOARTHRITIS OF BOTH KNEES: ICD-10-CM

## 2018-02-16 PROBLEM — R19.5 POSITIVE FIT (FECAL IMMUNOCHEMICAL TEST): Status: RESOLVED | Noted: 2018-02-06 | Resolved: 2018-02-16

## 2018-02-16 LAB — HBA1C MFR BLD: 5.8 %

## 2018-02-16 PROCEDURE — G8427 DOCREV CUR MEDS BY ELIG CLIN: HCPCS | Performed by: INTERNAL MEDICINE

## 2018-02-16 PROCEDURE — G8417 CALC BMI ABV UP PARAM F/U: HCPCS | Performed by: INTERNAL MEDICINE

## 2018-02-16 PROCEDURE — G8484 FLU IMMUNIZE NO ADMIN: HCPCS | Performed by: INTERNAL MEDICINE

## 2018-02-16 PROCEDURE — 3017F COLORECTAL CA SCREEN DOC REV: CPT | Performed by: INTERNAL MEDICINE

## 2018-02-16 PROCEDURE — 4004F PT TOBACCO SCREEN RCVD TLK: CPT | Performed by: INTERNAL MEDICINE

## 2018-02-16 PROCEDURE — 99213 OFFICE O/P EST LOW 20 MIN: CPT | Performed by: INTERNAL MEDICINE

## 2018-02-16 PROCEDURE — 83036 HEMOGLOBIN GLYCOSYLATED A1C: CPT | Performed by: INTERNAL MEDICINE

## 2018-02-19 ASSESSMENT — ENCOUNTER SYMPTOMS
NAUSEA: 0
HEMOPTYSIS: 0
ABDOMINAL PAIN: 0
COUGH: 0
HEARTBURN: 0
BLURRED VISION: 0
DOUBLE VISION: 0

## 2018-02-19 NOTE — PROGRESS NOTES
[facility]  [facility] [address] [facility]  [address] [facility] [address] [facility]  Dept: [phone]  Dept Fax: [phone]    Office Progress/Follow Up Note  Date of patient's visit: 2/19/2018  Patient's Name:  Mario Henriquez Sr. YOB: 1965            Patient Care Team:  Juliana Sprague MD as PCP - General (Internal Medicine)  Pilar Christianson MD as Consulting Physician (Vascular Surgery)  Deborah Ramsay MD as Surgeon (General Surgery: [facility])  Yan Pierre MD as Consulting Physician (Infectious Diseases)  Asya Casillas MD as Consulting Physician (Gastroenterology)  ================================================================    REASON FOR VISIT/CHIEF COMPLAINT:  Hypertension    HISTORY OF PRESENTING ILLNESS:  History was obtained from: patient. Mario Henriquez Sr. is a 50 y.o. is here for a follow-up visit. The patient doesn't have any planes today. He has history of hypertension which is controlled with Lopressor. He also has history of prediabetes managed with diet and exercise. He is on her present for treatment of GERD. He doesn't have any symptoms related to GERD. He has primary osteoarthritis of the knee for which he takes over-the-counter Tylenol and Motrin. Problem list, medications and blood work reviewed       Patient Active Problem List   Diagnosis    Essential hypertension    Sickle cell trait ([facility] [address]    Primary osteoarthritis of knee    Portal vein thrombosis, june 2017, completed course of warfarin     Gastroesophageal reflux disease without esophagitis       Health Maintenance Due   Topic Date Due    DTaP/Tdap/Td vaccine (1 - Tdap) 11/27/1986    Flu vaccine (1) 09/01/2017       No Known Allergies      Current Outpatient Prescriptions   Medication Sig Dispense Refill    polyethylene glycol (GLYCOLAX) powder Please dispense with 4 dulcolax tabs with this prescription.   Use as directed by following your patient instructions Psychiatric/Behavioral: Negative for depression and suicidal ideas. PHYSICAL EXAM:  Vitals:    02/16/18 1102   BP: 137/75   Site: Right Arm   Position: Sitting   Cuff Size: Medium Adult   Pulse: 80   Weight: 214 lb (97.1 kg)   Height: 5' 11.5\" (1.816 m)     BP Readings from Last 3 Encounters:   02/16/18 137/75   02/06/18 (!) 148/87   12/06/17 130/70        Physical Exam   Constitutional: He is oriented to person, place, and time and well-developed, well-nourished, and in no distress. No distress. HENT:   Mouth/Throat: No oropharyngeal exudate. Neck: Normal range of motion. Neck supple. No thyromegaly present. Cardiovascular: Normal rate, regular rhythm, normal heart sounds and intact distal pulses. Pulmonary/Chest: Effort normal and breath sounds normal. No respiratory distress. He has no wheezes. Abdominal: Soft. Bowel sounds are normal. He exhibits no distension and no mass. There is no tenderness. Musculoskeletal: Normal range of motion. He exhibits no edema or tenderness. Neurological: He is alert and oriented to person, place, and time. No cranial nerve deficit. Skin: Skin is warm. No rash noted. He is not diaphoretic. No erythema.    Psychiatric: Mood, memory, affect and judgment normal.         DIAGNOSTIC FINDINGS:  CBC:  Lab Results   Component Value Date    WBC 7.8 07/26/2017    HGB 12.6 07/26/2017     07/26/2017       BMP:    Lab Results   Component Value Date     07/26/2017     07/26/2017    K 4.0 07/26/2017    K 4.0 07/26/2017    CL 99 07/26/2017    CL 99 07/26/2017    CO2 23 07/26/2017    CO2 23 07/26/2017    BUN 13 07/26/2017    BUN 13 07/26/2017    CREATININE 0.86 07/26/2017    CREATININE 0.86 07/26/2017    GLUCOSE 84 07/26/2017    GLUCOSE 84 07/26/2017    GLUCOSE 132 05/08/2012       HEMOGLOBIN A1C:   Lab Results   Component Value Date    LABA1C 5.8 02/16/2018       FASTING LIPID PANEL:  Lab Results   Component Value Date    CHOL 159 07/22/2016    HDL 46

## 2018-02-20 ENCOUNTER — HOSPITAL ENCOUNTER (OUTPATIENT)
Facility: CLINIC | Age: 53
Setting detail: OUTPATIENT SURGERY
Discharge: HOME | End: 2018-02-20
Attending: INTERNAL MEDICINE | Admitting: INTERNAL MEDICINE
Payer: COMMERCIAL

## 2018-02-20 ENCOUNTER — HOSPITAL ENCOUNTER (OUTPATIENT)
Age: 53
Setting detail: SPECIMEN
Discharge: HOME | End: 2018-02-20
Payer: COMMERCIAL

## 2018-02-20 ENCOUNTER — ANESTHESIA (OUTPATIENT)
Dept: OPERATING ROOM | Facility: CLINIC | Age: 53
End: 2018-02-20
Payer: COMMERCIAL

## 2018-02-20 ENCOUNTER — ANESTHESIA EVENT (OUTPATIENT)
Dept: OPERATING ROOM | Facility: CLINIC | Age: 53
End: 2018-02-20
Payer: COMMERCIAL

## 2018-02-20 VITALS
RESPIRATION RATE: 17 BRPM | BODY MASS INDEX: 30.1 KG/M2 | DIASTOLIC BLOOD PRESSURE: 87 MMHG | HEIGHT: 71 IN | HEART RATE: 72 BPM | OXYGEN SATURATION: 95 % | WEIGHT: 215 LBS | TEMPERATURE: 97 F | SYSTOLIC BLOOD PRESSURE: 139 MMHG

## 2018-02-20 VITALS
OXYGEN SATURATION: 96 % | SYSTOLIC BLOOD PRESSURE: 129 MMHG | RESPIRATION RATE: 22 BRPM | DIASTOLIC BLOOD PRESSURE: 84 MMHG

## 2018-02-20 PROCEDURE — 3700000000 HC ANESTHESIA ATTENDED CARE: Performed by: INTERNAL MEDICINE

## 2018-02-20 PROCEDURE — 7100000001 HC PACU RECOVERY - ADDTL 15 MIN: Performed by: INTERNAL MEDICINE

## 2018-02-20 PROCEDURE — 3609010300 HC COLONOSCOPY W/BIOPSY SINGLE/MULTIPLE: Performed by: INTERNAL MEDICINE

## 2018-02-20 PROCEDURE — 7100000011 HC PHASE II RECOVERY - ADDTL 15 MIN: Performed by: INTERNAL MEDICINE

## 2018-02-20 PROCEDURE — 45390 COLONOSCOPY W/RESECTION: CPT | Performed by: INTERNAL MEDICINE

## 2018-02-20 PROCEDURE — 7100000000 HC PACU RECOVERY - FIRST 15 MIN: Performed by: INTERNAL MEDICINE

## 2018-02-20 PROCEDURE — 7100000010 HC PHASE II RECOVERY - FIRST 15 MIN: Performed by: INTERNAL MEDICINE

## 2018-02-20 PROCEDURE — 2580000003 HC RX 258: Performed by: ANESTHESIOLOGY

## 2018-02-20 PROCEDURE — 45380 COLONOSCOPY AND BIOPSY: CPT | Performed by: INTERNAL MEDICINE

## 2018-02-20 PROCEDURE — 6360000002 HC RX W HCPCS: Performed by: NURSE ANESTHETIST, CERTIFIED REGISTERED

## 2018-02-20 PROCEDURE — 2500000003 HC RX 250 WO HCPCS: Performed by: NURSE ANESTHETIST, CERTIFIED REGISTERED

## 2018-02-20 PROCEDURE — 2580000003 HC RX 258: Performed by: NURSE ANESTHETIST, CERTIFIED REGISTERED

## 2018-02-20 PROCEDURE — 2780000010 HC IMPLANT OTHER: Performed by: INTERNAL MEDICINE

## 2018-02-20 PROCEDURE — 3700000001 HC ADD 15 MINUTES (ANESTHESIA): Performed by: INTERNAL MEDICINE

## 2018-02-20 RX ORDER — SODIUM CHLORIDE, SODIUM LACTATE, POTASSIUM CHLORIDE, CALCIUM CHLORIDE 600; 310; 30; 20 MG/100ML; MG/100ML; MG/100ML; MG/100ML
INJECTION, SOLUTION INTRAVENOUS CONTINUOUS PRN
Status: DISCONTINUED | OUTPATIENT
Start: 2018-02-20 | End: 2018-02-20 | Stop reason: SDUPTHER

## 2018-02-20 RX ORDER — MIDAZOLAM HYDROCHLORIDE 1 MG/ML
1 INJECTION INTRAMUSCULAR; INTRAVENOUS EVERY 10 MIN PRN
Status: DISCONTINUED | OUTPATIENT
Start: 2018-02-20 | End: 2018-02-20 | Stop reason: HOSPADM

## 2018-02-20 RX ORDER — SODIUM CHLORIDE, SODIUM LACTATE, POTASSIUM CHLORIDE, CALCIUM CHLORIDE 600; 310; 30; 20 MG/100ML; MG/100ML; MG/100ML; MG/100ML
INJECTION, SOLUTION INTRAVENOUS CONTINUOUS
Status: DISCONTINUED | OUTPATIENT
Start: 2018-02-20 | End: 2018-02-20 | Stop reason: HOSPADM

## 2018-02-20 RX ORDER — PROPOFOL 10 MG/ML
INJECTION, EMULSION INTRAVENOUS PRN
Status: DISCONTINUED | OUTPATIENT
Start: 2018-02-20 | End: 2018-02-20 | Stop reason: SDUPTHER

## 2018-02-20 RX ORDER — ONDANSETRON 2 MG/ML
4 INJECTION INTRAMUSCULAR; INTRAVENOUS
Status: DISCONTINUED | OUTPATIENT
Start: 2018-02-20 | End: 2018-02-20 | Stop reason: HOSPADM

## 2018-02-20 RX ORDER — LIDOCAINE HYDROCHLORIDE 10 MG/ML
INJECTION, SOLUTION INFILTRATION; PERINEURAL PRN
Status: DISCONTINUED | OUTPATIENT
Start: 2018-02-20 | End: 2018-02-20 | Stop reason: SDUPTHER

## 2018-02-20 RX ORDER — MEPERIDINE HYDROCHLORIDE 50 MG/ML
12.5 INJECTION INTRAMUSCULAR; INTRAVENOUS; SUBCUTANEOUS EVERY 5 MIN PRN
Status: DISCONTINUED | OUTPATIENT
Start: 2018-02-20 | End: 2018-02-20 | Stop reason: HOSPADM

## 2018-02-20 RX ORDER — FENTANYL CITRATE 50 UG/ML
25 INJECTION, SOLUTION INTRAMUSCULAR; INTRAVENOUS EVERY 5 MIN PRN
Status: DISCONTINUED | OUTPATIENT
Start: 2018-02-20 | End: 2018-02-20 | Stop reason: HOSPADM

## 2018-02-20 RX ADMIN — PROPOFOL 30 MG: 10 INJECTION, EMULSION INTRAVENOUS at 10:51

## 2018-02-20 RX ADMIN — LIDOCAINE HYDROCHLORIDE 50 MG: 10 INJECTION, SOLUTION INFILTRATION; PERINEURAL at 10:45

## 2018-02-20 RX ADMIN — PROPOFOL 40 MG: 10 INJECTION, EMULSION INTRAVENOUS at 10:59

## 2018-02-20 RX ADMIN — PROPOFOL 20 MG: 10 INJECTION, EMULSION INTRAVENOUS at 10:49

## 2018-02-20 RX ADMIN — PROPOFOL 20 MG: 10 INJECTION, EMULSION INTRAVENOUS at 11:05

## 2018-02-20 RX ADMIN — PROPOFOL 50 MG: 10 INJECTION, EMULSION INTRAVENOUS at 10:48

## 2018-02-20 RX ADMIN — PROPOFOL 20 MG: 10 INJECTION, EMULSION INTRAVENOUS at 11:04

## 2018-02-20 RX ADMIN — PROPOFOL 20 MG: 10 INJECTION, EMULSION INTRAVENOUS at 11:02

## 2018-02-20 RX ADMIN — PROPOFOL 30 MG: 10 INJECTION, EMULSION INTRAVENOUS at 10:58

## 2018-02-20 RX ADMIN — SODIUM CHLORIDE, POTASSIUM CHLORIDE, SODIUM LACTATE AND CALCIUM CHLORIDE: 600; 310; 30; 20 INJECTION, SOLUTION INTRAVENOUS at 09:50

## 2018-02-20 RX ADMIN — PROPOFOL 30 MG: 10 INJECTION, EMULSION INTRAVENOUS at 10:55

## 2018-02-20 RX ADMIN — PROPOFOL 30 MG: 10 INJECTION, EMULSION INTRAVENOUS at 10:53

## 2018-02-20 RX ADMIN — SODIUM CHLORIDE, POTASSIUM CHLORIDE, SODIUM LACTATE AND CALCIUM CHLORIDE: 600; 310; 30; 20 INJECTION, SOLUTION INTRAVENOUS at 11:03

## 2018-02-20 RX ADMIN — PROPOFOL 50 MG: 10 INJECTION, EMULSION INTRAVENOUS at 10:47

## 2018-02-20 RX ADMIN — PROPOFOL 50 MG: 10 INJECTION, EMULSION INTRAVENOUS at 10:45

## 2018-02-20 RX ADMIN — SODIUM CHLORIDE, POTASSIUM CHLORIDE, SODIUM LACTATE AND CALCIUM CHLORIDE: 600; 310; 30; 20 INJECTION, SOLUTION INTRAVENOUS at 10:44

## 2018-02-20 RX ADMIN — PROPOFOL 50 MG: 10 INJECTION, EMULSION INTRAVENOUS at 10:46

## 2018-02-20 RX ADMIN — PROPOFOL 20 MG: 10 INJECTION, EMULSION INTRAVENOUS at 10:57

## 2018-02-20 ASSESSMENT — PULMONARY FUNCTION TESTS
PIF_VALUE: 0

## 2018-02-20 ASSESSMENT — PAIN - FUNCTIONAL ASSESSMENT: PAIN_FUNCTIONAL_ASSESSMENT: 0-10

## 2018-02-20 ASSESSMENT — PAIN SCALES - GENERAL: PAINLEVEL_OUTOF10: 0

## 2018-02-20 NOTE — ANESTHESIA PRE PROCEDURE
CREATININE 0.86 07/26/2017    GFRAA >60 07/26/2017    GFRAA >60 07/26/2017    LABGLOM >60 07/26/2017    LABGLOM >60 07/26/2017    GLUCOSE 84 07/26/2017    GLUCOSE 84 07/26/2017    GLUCOSE 132 05/08/2012    PROT 8.4 07/26/2017    CALCIUM 8.9 07/26/2017    CALCIUM 8.9 07/26/2017    BILITOT 0.55 07/26/2017    ALKPHOS 130 07/26/2017    ALKPHOS 130 07/26/2017    AST 43 07/26/2017    AST 43 07/26/2017    ALT 52 07/26/2017    ALT 52 07/26/2017       POC Tests: No results for input(s): POCGLU, POCNA, POCK, POCCL, POCBUN, POCHEMO, POCHCT in the last 72 hours. Coags:   Lab Results   Component Value Date    PROTIME 27.1 10/13/2017    INR 2.49 10/13/2017       HCG (If Applicable): No results found for: PREGTESTUR, PREGSERUM, HCG, HCGQUANT     ABGs: No results found for: PHART, PO2ART, JWY8MYL, GMQ2NFH, BEART, H6YFTJTC     Type & Screen (If Applicable):  No results found for: LABABO, [address]    Anesthesia Evaluation  Patient summary reviewed and Nursing notes reviewed no history of anesthetic complications:   Airway: Mallampati: III  TM distance: >3 FB   Neck ROM: full  Mouth opening: > = 3 FB Dental: normal exam         Pulmonary:Negative Pulmonary ROS and normal exam                               Cardiovascular:    (+) hypertension:,                   Neuro/Psych:   (+) psychiatric history:            GI/Hepatic/Renal:   (+) GERD:, liver disease:,           Endo/Other:    (+) blood dyscrasia::., .                  ROS comment: History of sickle cell trait Abdominal:   (+) obese,         Vascular: negative vascular ROS. Anesthesia Plan      MAC     ASA 3     (ASA 3 for liver disease  Anethesia consent obtained from patient)  Induction: intravenous. MIPS: Postoperative opioids intended. Anesthetic plan and risks discussed with patient.                       Reyes Avila MD   2/20/2018

## 2018-02-20 NOTE — ANESTHESIA POSTPROCEDURE EVALUATION
Department of Anesthesiology  Postprocedure Note    Patient: Louis Lr Sr. MRN: [id number]  YOB: 1965  Date of evaluation: 2/20/2018  Time:  11:27 AM     Procedure Summary     Date:  02/20/18 Room / Location:  [facility] [address] [place] [address] [facility]    Anesthesia Start:  0683 Anesthesia Stop:  3309    Procedure:  COLONOSCOPY WITH BIOPSY (N/A ) Diagnosis:  (POSITIVE FIT)    Surgeon:  Nathalie Navarro MD Responsible Provider:  Edin Rosado MD    Anesthesia Type:  MAC ASA Status:  3          Anesthesia Type: MAC    Cale Phase I: Cale Score: 8    Cale Phase II:      Last vitals: Reviewed and per EMR flowsheets.        Anesthesia Post Evaluation    Patient location during evaluation: PACU  Patient participation: complete - patient participated  Level of consciousness: awake  Pain score: 0  Airway patency: patent  Nausea & Vomiting: no vomiting and no nausea  Complications: no  Cardiovascular status: hemodynamically stable  Respiratory status: acceptable  Hydration status: stable

## 2018-02-22 LAB — SURGICAL PATHOLOGY REPORT: NORMAL

## 2018-04-19 DIAGNOSIS — M17.0 PRIMARY OSTEOARTHRITIS OF BOTH KNEES: ICD-10-CM

## 2018-04-19 RX ORDER — TRAMADOL HYDROCHLORIDE 50 MG/1
50 TABLET ORAL DAILY PRN
Qty: 30 TABLET | Refills: 0 | Status: SHIPPED | OUTPATIENT
Start: 2018-04-19 | End: 2018-04-30

## 2018-04-23 RX ORDER — ACETAMINOPHEN/DIPHENHYDRAMINE 500MG-25MG
TABLET ORAL
Qty: 30 TABLET | Refills: 3 | Status: SHIPPED | OUTPATIENT
Start: 2018-04-23 | End: 2018-08-02 | Stop reason: SDUPTHER

## 2018-04-30 ENCOUNTER — OFFICE VISIT (OUTPATIENT)
Dept: INTERNAL MEDICINE | Age: 53
End: 2018-04-30
Payer: COMMERCIAL

## 2018-04-30 VITALS
DIASTOLIC BLOOD PRESSURE: 82 MMHG | BODY MASS INDEX: 28.44 KG/M2 | HEIGHT: 72 IN | SYSTOLIC BLOOD PRESSURE: 134 MMHG | WEIGHT: 210 LBS | HEART RATE: 76 BPM

## 2018-04-30 DIAGNOSIS — K21.9 GASTROESOPHAGEAL REFLUX DISEASE WITHOUT ESOPHAGITIS: ICD-10-CM

## 2018-04-30 DIAGNOSIS — D57.3 SICKLE CELL TRAIT (HCC): ICD-10-CM

## 2018-04-30 DIAGNOSIS — L85.3 DRY SKIN DERMATITIS: ICD-10-CM

## 2018-04-30 DIAGNOSIS — I10 ESSENTIAL HYPERTENSION: Primary | ICD-10-CM

## 2018-04-30 DIAGNOSIS — Z23 NEED FOR IMMUNIZATION AGAINST TETANUS ALONE: ICD-10-CM

## 2018-04-30 PROCEDURE — G8417 CALC BMI ABV UP PARAM F/U: HCPCS | Performed by: INTERNAL MEDICINE

## 2018-04-30 PROCEDURE — G8427 DOCREV CUR MEDS BY ELIG CLIN: HCPCS | Performed by: INTERNAL MEDICINE

## 2018-04-30 PROCEDURE — 4004F PT TOBACCO SCREEN RCVD TLK: CPT | Performed by: INTERNAL MEDICINE

## 2018-04-30 PROCEDURE — 99212 OFFICE O/P EST SF 10 MIN: CPT

## 2018-04-30 PROCEDURE — 99214 OFFICE O/P EST MOD 30 MIN: CPT | Performed by: INTERNAL MEDICINE

## 2018-04-30 PROCEDURE — 3017F COLORECTAL CA SCREEN DOC REV: CPT | Performed by: INTERNAL MEDICINE

## 2018-04-30 RX ORDER — FOLIC ACID 1 MG/1
1000 TABLET ORAL DAILY
Qty: 30 TABLET | Refills: 2 | Status: SHIPPED | OUTPATIENT
Start: 2018-04-30 | End: 2018-08-02 | Stop reason: SDUPTHER

## 2018-04-30 RX ORDER — ASPIRIN 325 MG/1
TABLET, FILM COATED ORAL
Refills: 0 | COMMUNITY
Start: 2018-04-22 | End: 2019-02-06 | Stop reason: SDUPTHER

## 2018-04-30 RX ORDER — TRIAMCINOLONE ACETONIDE 0.25 MG/G
CREAM TOPICAL
Qty: 60 G | Refills: 2 | Status: SHIPPED | OUTPATIENT
Start: 2018-04-30 | End: 2019-09-16 | Stop reason: SDUPTHER

## 2018-04-30 RX ORDER — LANOLIN ALCOHOL/MO/W.PET/CERES
100 CREAM (GRAM) TOPICAL DAILY
Qty: 30 TABLET | Refills: 2 | Status: SHIPPED | OUTPATIENT
Start: 2018-04-30 | End: 2018-08-02 | Stop reason: SDUPTHER

## 2018-04-30 RX ORDER — OMEPRAZOLE 20 MG/1
20 CAPSULE, DELAYED RELEASE ORAL DAILY
Qty: 30 CAPSULE | Refills: 2 | Status: SHIPPED | OUTPATIENT
Start: 2018-04-30 | End: 2018-08-02 | Stop reason: SDUPTHER

## 2018-04-30 ASSESSMENT — ENCOUNTER SYMPTOMS
HEMOPTYSIS: 0
COUGH: 0
ABDOMINAL PAIN: 0
HEARTBURN: 0
BLURRED VISION: 0
NAUSEA: 0
DOUBLE VISION: 0

## 2018-05-30 DIAGNOSIS — M17.0 PRIMARY OSTEOARTHRITIS OF BOTH KNEES: ICD-10-CM

## 2018-05-30 RX ORDER — TRAMADOL HYDROCHLORIDE 50 MG/1
TABLET ORAL
Qty: 30 TABLET | Refills: 0 | Status: SHIPPED | OUTPATIENT
Start: 2018-05-30 | End: 2018-06-29

## 2018-07-23 ENCOUNTER — TELEPHONE (OUTPATIENT)
Dept: INTERNAL MEDICINE | Age: 53
End: 2018-07-23

## 2018-07-23 DIAGNOSIS — K04.7 TOOTH INFECTION: Primary | ICD-10-CM

## 2018-07-23 RX ORDER — AMOXICILLIN 500 MG/1
500 CAPSULE ORAL 3 TIMES DAILY
Qty: 30 CAPSULE | Refills: 0 | Status: SHIPPED | OUTPATIENT
Start: 2018-07-23 | End: 2018-08-02

## 2018-07-23 NOTE — TELEPHONE ENCOUNTER
pc asking for antibiotic for infected tooth he is going to see an oral surgeon and is also having pain with this , pt concern due to being septic previously , please advise       Next Visit Date:  Future Appointments  Date Time Provider Marco A Barreto   7/25/2018 2:00 PM Juliana Ricketts MD [facility]   8/31/2018 2:00 PM Juliana Ricketts MD [facility] IM [address] Maintenance   Topic Date Due    DTaP/Tdap/Td vaccine (1 - Tdap) 11/27/1986    Shingles Vaccine (1 of 2 - 2 Dose Series) 11/27/2017    Potassium monitoring  07/26/2018    Creatinine monitoring  07/26/2018    Flu vaccine (1) 09/01/2018    A1C test (Diabetic or Prediabetic)  02/16/2019    Colon cancer screen colonoscopy  02/20/2021    Lipid screen  07/22/2021    Pneumococcal med risk  Completed    HIV screen  Completed       Hemoglobin A1C (%)   Date Value   02/16/2018 5.8   05/14/2012 5.8             ( goal A1C is < 7)   No results found for: LABMICR  LDL Cholesterol (mg/dL)   Date Value   07/22/2016 104       (goal LDL is <100)   AST (U/L)   Date Value   07/26/2017 43 (H)   07/26/2017 43 (H)     ALT (U/L)   Date Value   07/26/2017 52 (H)   07/26/2017 52 (H)     BUN (mg/dL)   Date Value   07/26/2017 13   07/26/2017 13     BP Readings from Last 3 Encounters:   04/30/18 134/82   02/20/18 139/87   02/20/18 129/84          (goal 120/80)    All Future Testing planned in [facility]  Lab Frequency Next Occurrence               Patient Active Problem List:     Essential hypertension     Sickle cell trait (HCC)     Primary osteoarthritis of knee     Portal vein thrombosis, june 2017, completed course of warfarin      Gastroesophageal reflux disease without esophagitis

## 2018-07-25 NOTE — TELEPHONE ENCOUNTER
Patient notified that Amoxicillin was escribed to [facility].   Patient said he has already picked up script

## 2018-08-02 ENCOUNTER — OFFICE VISIT (OUTPATIENT)
Dept: INTERNAL MEDICINE | Age: 53
End: 2018-08-02
Payer: COMMERCIAL

## 2018-08-02 VITALS
DIASTOLIC BLOOD PRESSURE: 88 MMHG | HEIGHT: 72 IN | WEIGHT: 205.4 LBS | SYSTOLIC BLOOD PRESSURE: 135 MMHG | HEART RATE: 77 BPM | BODY MASS INDEX: 27.82 KG/M2

## 2018-08-02 DIAGNOSIS — M17.0 PRIMARY OSTEOARTHRITIS OF BOTH KNEES: ICD-10-CM

## 2018-08-02 DIAGNOSIS — I10 ESSENTIAL HYPERTENSION: Primary | ICD-10-CM

## 2018-08-02 DIAGNOSIS — Z23 NEED FOR IMMUNIZATION AGAINST TETANUS ALONE: ICD-10-CM

## 2018-08-02 DIAGNOSIS — K21.9 GASTROESOPHAGEAL REFLUX DISEASE WITHOUT ESOPHAGITIS: ICD-10-CM

## 2018-08-02 DIAGNOSIS — D57.3 SICKLE CELL TRAIT (HCC): ICD-10-CM

## 2018-08-02 DIAGNOSIS — M25.571 CHRONIC PAIN OF RIGHT ANKLE: ICD-10-CM

## 2018-08-02 DIAGNOSIS — G89.29 CHRONIC PAIN OF RIGHT ANKLE: ICD-10-CM

## 2018-08-02 DIAGNOSIS — Z23 NEED FOR SHINGLES VACCINE: ICD-10-CM

## 2018-08-02 PROCEDURE — 99214 OFFICE O/P EST MOD 30 MIN: CPT | Performed by: INTERNAL MEDICINE

## 2018-08-02 PROCEDURE — G8427 DOCREV CUR MEDS BY ELIG CLIN: HCPCS | Performed by: INTERNAL MEDICINE

## 2018-08-02 PROCEDURE — 3017F COLORECTAL CA SCREEN DOC REV: CPT | Performed by: INTERNAL MEDICINE

## 2018-08-02 PROCEDURE — 99213 OFFICE O/P EST LOW 20 MIN: CPT | Performed by: INTERNAL MEDICINE

## 2018-08-02 PROCEDURE — G8417 CALC BMI ABV UP PARAM F/U: HCPCS | Performed by: INTERNAL MEDICINE

## 2018-08-02 PROCEDURE — 4004F PT TOBACCO SCREEN RCVD TLK: CPT | Performed by: INTERNAL MEDICINE

## 2018-08-02 RX ORDER — OMEPRAZOLE 20 MG/1
20 CAPSULE, DELAYED RELEASE ORAL DAILY
Qty: 30 CAPSULE | Refills: 2 | Status: SHIPPED | OUTPATIENT
Start: 2018-08-02 | End: 2019-02-06 | Stop reason: SDUPTHER

## 2018-08-02 RX ORDER — ASPIRIN 81 MG/1
81 TABLET ORAL DAILY
Qty: 30 TABLET | Refills: 2 | Status: SHIPPED | OUTPATIENT
Start: 2018-08-02 | End: 2019-02-06 | Stop reason: SDUPTHER

## 2018-08-02 RX ORDER — LANOLIN ALCOHOL/MO/W.PET/CERES
100 CREAM (GRAM) TOPICAL DAILY
Qty: 30 TABLET | Refills: 2 | Status: SHIPPED | OUTPATIENT
Start: 2018-08-02 | End: 2019-02-06 | Stop reason: SDUPTHER

## 2018-08-02 RX ORDER — TRAMADOL HYDROCHLORIDE 50 MG/1
50 TABLET ORAL DAILY PRN
Qty: 30 TABLET | Refills: 2 | Status: SHIPPED | OUTPATIENT
Start: 2018-08-02 | End: 2018-11-30 | Stop reason: SDUPTHER

## 2018-08-02 RX ORDER — GABAPENTIN 300 MG/1
300 CAPSULE ORAL 3 TIMES DAILY
Qty: 90 CAPSULE | Refills: 2 | Status: SHIPPED | OUTPATIENT
Start: 2018-08-02 | End: 2019-02-06 | Stop reason: SDUPTHER

## 2018-08-02 RX ORDER — TRAMADOL HYDROCHLORIDE 50 MG/1
TABLET ORAL
Qty: 30 TABLET | Refills: 0 | Status: CANCELLED | OUTPATIENT
Start: 2018-08-02 | End: 2018-09-01

## 2018-08-02 RX ORDER — FOLIC ACID 1 MG/1
1000 TABLET ORAL DAILY
Qty: 30 TABLET | Refills: 2 | Status: SHIPPED | OUTPATIENT
Start: 2018-08-02 | End: 2019-02-06 | Stop reason: SDUPTHER

## 2018-08-02 RX ORDER — IBUPROFEN 800 MG/1
800 TABLET ORAL EVERY 8 HOURS PRN
Qty: 90 TABLET | Refills: 2 | Status: SHIPPED | OUTPATIENT
Start: 2018-08-02 | End: 2018-11-30 | Stop reason: SDUPTHER

## 2018-08-02 ASSESSMENT — ENCOUNTER SYMPTOMS
NAUSEA: 0
ABDOMINAL PAIN: 0
HEMOPTYSIS: 0
HEARTBURN: 0
DOUBLE VISION: 0
BLURRED VISION: 0
COUGH: 0

## 2018-08-02 NOTE — PROGRESS NOTES
[facility]  [facility] [address] [facility]  [address] [facility] 29 [facility]  Dept: [phone]  Dept Fax: [phone]    Office Progress/Follow Up Note  Date of patient's visit: 8/2/2018  Patient's Name:  Pantera Huerta Sr. YOB: 1965            Patient Care Team:  Juliana Ricketts MD as PCP - General (Internal Medicine)  Ananya Bernstein MD as Consulting Physician (Vascular Surgery)  Gavino Darden MD as Surgeon (General Surgery: Harris All)  Brandon Pearce MD as Consulting Physician (Infectious Diseases)  Cady Ulloa MD as Consulting Physician (Gastroenterology)  ================================================================    REASON FOR VISIT/CHIEF COMPLAINT:  3 Month Follow-Up; Hypertension; and Medication Refill (Needs refill on Tramadol and Thiamine)    HISTORY OF PRESENTING ILLNESS:  History was obtained from: patient. Pantera Huerta Sr. is a 50 y.o. is here for a follow-up visit. Patient is doing well. His main complaint continues to be his chronic pain in the right ankle. As with ambulation. He reports that he is not able to ambulate and complete activities of daily living as a result. He has had surgery in the past.  He is asking for refill on his tramadol. Patient has not been tried on gabapentin. He has tried Motrin and Tylenol without any improvement. He has history of hypertension and GERD for which he is on medications. He is due for medication refills today.   Problem list, medications and blood work reviewed       Patient Active Problem List   Diagnosis    Essential hypertension    Sickle cell trait ([facility] [address]    Primary osteoarthritis of knee    Portal vein thrombosis, june 2017, completed course of warfarin     Gastroesophageal reflux disease without esophagitis    Chronic pain of right ankle       Health Maintenance Due   Topic Date Due    DTaP/Tdap/Td vaccine (1 - Tdap) 11/27/1986    Shingles Vaccine (1 of 2 - 2 Dose 07/26/2017       BMP:    Lab Results   Component Value Date     07/26/2017     07/26/2017    K 4.0 07/26/2017    K 4.0 07/26/2017    CL 99 07/26/2017    CL 99 07/26/2017    CO2 23 07/26/2017    CO2 23 07/26/2017    BUN 13 07/26/2017    BUN 13 07/26/2017    CREATININE 0.86 07/26/2017    CREATININE 0.86 07/26/2017    GLUCOSE 84 07/26/2017    GLUCOSE 84 07/26/2017    GLUCOSE 132 05/08/2012       HEMOGLOBIN A1C:   Lab Results   Component Value Date    LABA1C 5.8 02/16/2018       FASTING LIPID PANEL:  Lab Results   Component Value Date    CHOL 159 07/22/2016    HDL 46 07/22/2016    TRIG 47 07/22/2016       ASSESSMENT AND PLAN:  Jocelin Jordan was seen today for 3 month follow-up, hypertension and medication refill. Diagnoses and all orders for this visit:    Essential hypertension  -     metoprolol tartrate (LOPRESSOR) 25 MG tablet; Take 0.5 tablets by mouth 2 times daily    Primary osteoarthritis of both knees  -     ibuprofen (ADVIL;MOTRIN) 800 MG tablet; Take 1 tablet by mouth every 8 hours as needed for Pain With food  -     traMADol (ULTRAM) 50 MG tablet; Take 1 tablet by mouth daily as needed for Pain for up to 30 days. .  -     gabapentin (NEURONTIN) 300 MG capsule; Take 1 capsule by mouth 3 times daily for 30 days. .    Sickle cell trait (HCC)  -     thiamine 100 MG tablet; Take 1 tablet by mouth daily  -     folic acid (FOLVITE) 1 MG tablet; Take 1 tablet by mouth daily  -     aspirin (RA ASPIRIN EC ADULT LOW ST) 81 MG EC tablet; Take 1 tablet by mouth daily    Gastroesophageal reflux disease without esophagitis  -     omeprazole (PRILOSEC) 20 MG delayed release capsule; Take 1 capsule by mouth daily    Need for shingles vaccine  -     Discontinue: zoster recombinant adjuvanted vaccine (SHINGRIX) 50 MCG SUSR injection; Inject 0.5 mLs into the muscle once for 1 dose  -     zoster recombinant adjuvanted vaccine (SHINGRIX) 50 MCG SUSR injection;  Inject 0.5 mLs into the muscle once for 1 dose    Need for immunization against tetanus alone  -     Tetanus-Diphth-Acell Pertussis (BOOSTRIX) 5-2.5-18.5 LF-MCG/0.5 injection; Inject 0.5 mLs into the muscle once for 1 dose    Chronic pain of right ankle  -     traMADol (ULTRAM) 50 MG tablet; Take 1 tablet by mouth daily as needed for Pain for up to 30 days. .  -     gabapentin (NEURONTIN) 300 MG capsule; Take 1 capsule by mouth 3 times daily for 30 days. .    Other orders  -     Cancel: traMADol (ULTRAM) 50 MG tablet;       FOLLOW UP AND INSTRUCTIONS:  · Return in about 3 months (around 11/2/2018) for HTN. · Shakira Polo received counseling on the following healthy behaviors: nutrition and exercise    · Discussed use, benefit, and side effects of prescribed medications. Barriers to medication compliance addressed. All patient questions answered. Pt voiced understanding. · Patient given educational materials - see patient instructions    Juliana Brown MD, MAIDA, [address] [place] [address] Internal Medicine Associate  8/2/2018, 5:19 PM    This note is created with the assistance of a speech-recognition program. While intending to generate a document that actually reflects the content of the visit, the document can still have some mistakes which may not have been identified and corrected by editing.

## 2018-08-31 ENCOUNTER — OFFICE VISIT (OUTPATIENT)
Dept: INTERNAL MEDICINE | Age: 53
End: 2018-08-31
Payer: COMMERCIAL

## 2018-08-31 VITALS
HEIGHT: 72 IN | HEART RATE: 77 BPM | WEIGHT: 209 LBS | SYSTOLIC BLOOD PRESSURE: 139 MMHG | DIASTOLIC BLOOD PRESSURE: 80 MMHG | BODY MASS INDEX: 28.31 KG/M2

## 2018-08-31 DIAGNOSIS — Z00.00 WELLNESS EXAMINATION: Primary | ICD-10-CM

## 2018-08-31 PROCEDURE — G0439 PPPS, SUBSEQ VISIT: HCPCS | Performed by: INTERNAL MEDICINE

## 2018-08-31 PROCEDURE — 99211 OFF/OP EST MAY X REQ PHY/QHP: CPT | Performed by: INTERNAL MEDICINE

## 2018-08-31 ASSESSMENT — LIFESTYLE VARIABLES
HOW OFTEN DURING THE LAST YEAR HAVE YOU BEEN UNABLE TO REMEMBER WHAT HAPPENED THE NIGHT BEFORE BECAUSE YOU HAD BEEN DRINKING: 0
AUDIT-C TOTAL SCORE: 2
HOW OFTEN DURING THE LAST YEAR HAVE YOU NEEDED AN ALCOHOLIC DRINK FIRST THING IN THE MORNING TO GET YOURSELF GOING AFTER A NIGHT OF HEAVY DRINKING: 0
HAS A RELATIVE, FRIEND, DOCTOR, OR ANOTHER HEALTH PROFESSIONAL EXPRESSED CONCERN ABOUT YOUR DRINKING OR SUGGESTED YOU CUT DOWN: 0
HOW OFTEN DURING THE LAST YEAR HAVE YOU FOUND THAT YOU WERE NOT ABLE TO STOP DRINKING ONCE YOU HAD STARTED: 0
HOW OFTEN DO YOU HAVE SIX OR MORE DRINKS ON ONE OCCASION: 0
HOW OFTEN DURING THE LAST YEAR HAVE YOU FAILED TO DO WHAT WAS NORMALLY EXPECTED FROM YOU BECAUSE OF DRINKING: 0
HOW MANY STANDARD DRINKS CONTAINING ALCOHOL DO YOU HAVE ON A TYPICAL DAY: 0
HAVE YOU OR SOMEONE ELSE BEEN INJURED AS A RESULT OF YOUR DRINKING: 0
HOW OFTEN DO YOU HAVE A DRINK CONTAINING ALCOHOL: 2
HOW OFTEN DURING THE LAST YEAR HAVE YOU HAD A FEELING OF GUILT OR REMORSE AFTER DRINKING: 0
AUDIT TOTAL SCORE: 2

## 2018-08-31 ASSESSMENT — ENCOUNTER SYMPTOMS
COUGH: 0
HEARTBURN: 0
ABDOMINAL PAIN: 0
DOUBLE VISION: 0
NAUSEA: 0
HEMOPTYSIS: 0
BLURRED VISION: 0

## 2018-08-31 ASSESSMENT — PATIENT HEALTH QUESTIONNAIRE - PHQ9
SUM OF ALL RESPONSES TO PHQ QUESTIONS 1-9: 0
SUM OF ALL RESPONSES TO PHQ QUESTIONS 1-9: 0

## 2018-08-31 ASSESSMENT — ANXIETY QUESTIONNAIRES: GAD7 TOTAL SCORE: 1

## 2018-08-31 NOTE — PROGRESS NOTES
[facility]  [facility] [address] [facility]  [address] [facility] 29 [facility]  Dept: [phone]  Dept Fax: [phone]    Office Progress/Follow Up Note  Date of patient's visit: 8/31/2018  Patient's Name:  Ulises Jessica Sr. YOB: 1965            Patient Care Team:  Juliana Doyle MD as PCP - General (Internal Medicine)  Jimmy Bray MD as Consulting Physician (Vascular Surgery)  Kael Jordan MD as Surgeon (General Surgery: Kenyon Bhatia)  Andrew Fontaine MD as Consulting Physician (Infectious Diseases)  Olamide Leone MD as Consulting Physician (Gastroenterology)  Jimmy Bray MD as Consulting Physician (Vascular Surgery)  ================================================================    REASON FOR VISIT/CHIEF COMPLAINT:  Medicare AWV and Health Maintenance (he has script for Tdap and Shingrix)    HISTORY OF PRESENTING ILLNESS:  History was obtained from: patient. Ulises Jessica Sr. is a 50 y.o. is here for a Medicare annual wellness visit. Overall patient is doing well. He is up-to-date on his immunizations and screening. He denies any depression. He doesn't have any risk of falls. Denies any smoking. His medical history has been reviewed which are all stable. He is due for blood work today.   Problem list, medications and blood work reviewed      Patient Active Problem List   Diagnosis    Essential hypertension    Sickle cell trait ([facility] [address]    Primary osteoarthritis of knee    Portal vein thrombosis, june 2017, completed course of warfarin     Gastroesophageal reflux disease without esophagitis    Chronic pain of right ankle       Health Maintenance Due   Topic Date Due    DTaP/Tdap/Td vaccine (1 - Tdap) 11/27/1986    Shingles Vaccine (1 of 2 - 2 Dose Series) 11/27/2017    Potassium monitoring  07/26/2018    Creatinine monitoring  07/26/2018       No Known Allergies      Current Outpatient Prescriptions   Medication Sig Dispense

## 2018-11-30 DIAGNOSIS — G89.29 CHRONIC PAIN OF RIGHT ANKLE: ICD-10-CM

## 2018-11-30 DIAGNOSIS — M25.571 CHRONIC PAIN OF RIGHT ANKLE: ICD-10-CM

## 2018-11-30 DIAGNOSIS — M17.0 PRIMARY OSTEOARTHRITIS OF BOTH KNEES: ICD-10-CM

## 2018-11-30 RX ORDER — IBUPROFEN 800 MG/1
800 TABLET ORAL EVERY 8 HOURS PRN
Qty: 90 TABLET | Refills: 2 | Status: SHIPPED | OUTPATIENT
Start: 2018-11-30 | End: 2019-02-06 | Stop reason: SDUPTHER

## 2018-11-30 RX ORDER — TRAMADOL HYDROCHLORIDE 50 MG/1
50 TABLET ORAL DAILY PRN
Qty: 30 TABLET | Refills: 2 | Status: SHIPPED | OUTPATIENT
Start: 2018-11-30 | End: 2018-12-30

## 2019-02-06 ENCOUNTER — OFFICE VISIT (OUTPATIENT)
Dept: INTERNAL MEDICINE | Age: 54
End: 2019-02-06
Payer: COMMERCIAL

## 2019-02-06 VITALS
HEIGHT: 72 IN | WEIGHT: 204 LBS | BODY MASS INDEX: 27.63 KG/M2 | DIASTOLIC BLOOD PRESSURE: 88 MMHG | HEART RATE: 86 BPM | SYSTOLIC BLOOD PRESSURE: 135 MMHG

## 2019-02-06 DIAGNOSIS — D57.3 SICKLE CELL TRAIT (HCC): ICD-10-CM

## 2019-02-06 DIAGNOSIS — K21.9 GASTROESOPHAGEAL REFLUX DISEASE WITHOUT ESOPHAGITIS: ICD-10-CM

## 2019-02-06 DIAGNOSIS — M25.571 CHRONIC PAIN OF RIGHT ANKLE: ICD-10-CM

## 2019-02-06 DIAGNOSIS — I10 ESSENTIAL HYPERTENSION: ICD-10-CM

## 2019-02-06 DIAGNOSIS — G89.29 CHRONIC PAIN OF RIGHT ANKLE: ICD-10-CM

## 2019-02-06 DIAGNOSIS — R73.03 PRE-DIABETES: Primary | ICD-10-CM

## 2019-02-06 DIAGNOSIS — M17.0 PRIMARY OSTEOARTHRITIS OF BOTH KNEES: ICD-10-CM

## 2019-02-06 DIAGNOSIS — K59.01 SLOW TRANSIT CONSTIPATION: ICD-10-CM

## 2019-02-06 PROCEDURE — 4004F PT TOBACCO SCREEN RCVD TLK: CPT | Performed by: INTERNAL MEDICINE

## 2019-02-06 PROCEDURE — 3017F COLORECTAL CA SCREEN DOC REV: CPT | Performed by: INTERNAL MEDICINE

## 2019-02-06 PROCEDURE — 99214 OFFICE O/P EST MOD 30 MIN: CPT | Performed by: INTERNAL MEDICINE

## 2019-02-06 PROCEDURE — G8427 DOCREV CUR MEDS BY ELIG CLIN: HCPCS | Performed by: INTERNAL MEDICINE

## 2019-02-06 PROCEDURE — G8484 FLU IMMUNIZE NO ADMIN: HCPCS | Performed by: INTERNAL MEDICINE

## 2019-02-06 PROCEDURE — G8417 CALC BMI ABV UP PARAM F/U: HCPCS | Performed by: INTERNAL MEDICINE

## 2019-02-06 PROCEDURE — 99211 OFF/OP EST MAY X REQ PHY/QHP: CPT | Performed by: INTERNAL MEDICINE

## 2019-02-06 RX ORDER — LANOLIN ALCOHOL/MO/W.PET/CERES
100 CREAM (GRAM) TOPICAL DAILY
Qty: 30 TABLET | Refills: 5 | Status: SHIPPED | OUTPATIENT
Start: 2019-02-06 | End: 2020-05-21 | Stop reason: SDUPTHER

## 2019-02-06 RX ORDER — IBUPROFEN 800 MG/1
800 TABLET ORAL EVERY 8 HOURS PRN
Qty: 90 TABLET | Refills: 5 | Status: SHIPPED | OUTPATIENT
Start: 2019-02-06 | End: 2020-05-21 | Stop reason: SDUPTHER

## 2019-02-06 RX ORDER — DOCUSATE SODIUM 100 MG/1
100 CAPSULE, LIQUID FILLED ORAL 2 TIMES DAILY
COMMUNITY
Start: 2019-02-06 | End: 2020-05-12

## 2019-02-06 RX ORDER — GABAPENTIN 300 MG/1
300 CAPSULE ORAL 3 TIMES DAILY
Qty: 90 CAPSULE | Refills: 5 | Status: SHIPPED | OUTPATIENT
Start: 2019-02-06 | End: 2020-05-21 | Stop reason: SDUPTHER

## 2019-02-06 RX ORDER — OMEPRAZOLE 20 MG/1
20 CAPSULE, DELAYED RELEASE ORAL DAILY
Qty: 30 CAPSULE | Refills: 5 | Status: SHIPPED | OUTPATIENT
Start: 2019-02-06 | End: 2020-05-21 | Stop reason: SDUPTHER

## 2019-02-06 RX ORDER — FOLIC ACID 1 MG/1
1000 TABLET ORAL DAILY
Qty: 30 TABLET | Refills: 5 | Status: SHIPPED | OUTPATIENT
Start: 2019-02-06 | End: 2020-05-12

## 2019-02-06 RX ORDER — ASPIRIN 81 MG/1
81 TABLET ORAL DAILY
Qty: 30 TABLET | Refills: 5 | Status: SHIPPED | OUTPATIENT
Start: 2019-02-06 | End: 2020-05-21 | Stop reason: SDUPTHER

## 2019-02-06 ASSESSMENT — ENCOUNTER SYMPTOMS
PHOTOPHOBIA: 0
ABDOMINAL PAIN: 0
NAUSEA: 0
EYE DISCHARGE: 0
EYE PAIN: 0
COLOR CHANGE: 0
DIARRHEA: 0
BLOOD IN STOOL: 0
VOMITING: 0
CONSTIPATION: 0
WHEEZING: 0
SORE THROAT: 0
SHORTNESS OF BREATH: 0
BACK PAIN: 1
COUGH: 0

## 2019-07-23 ENCOUNTER — TELEPHONE (OUTPATIENT)
Dept: INTERNAL MEDICINE | Age: 54
End: 2019-07-23

## 2019-07-23 NOTE — LETTER
[address]  [address] [facility] [address] [address]  Phone: [phone]  Fax: [phone]    Juliana Finn MD        July 23, 2019    [address] [place] [address] [address] [facility] [id number]      Dear Clearance Si: We are sending this letter because your PCP ordered [facility] for you to have done at your last visit here and they have not yet been completed. If you can please come to our office on the 2nd floor to  your orders to have them compelted. If you do not have a follow-up appointment scheduled you can either contact the office to make an appointment with us or you can make one when you come in to pick-up your orders. If you have any questions or concerns, please don't hesitate to call.     Sincerely,        Juliana Finn MD

## 2019-09-16 DIAGNOSIS — L85.3 DRY SKIN DERMATITIS: ICD-10-CM

## 2019-09-18 RX ORDER — TRIAMCINOLONE ACETONIDE 0.25 MG/G
CREAM TOPICAL
Qty: 60 G | Refills: 2 | Status: SHIPPED | OUTPATIENT
Start: 2019-09-18 | End: 2020-05-12

## 2020-05-06 NOTE — TELEPHONE ENCOUNTER
Refill request for pended medications. If appropriate please send medication(s) to patients pharmacy.     Next appt: 5/12/2020 - [facility]  Last appt: 2/6/2019 - [address] Maintenance   Topic Date Due    DTaP/Tdap/Td vaccine (1 - Tdap) 11/27/1986    Shingles Vaccine (1 of 2) 11/27/2017    Potassium monitoring  07/26/2018    Creatinine monitoring  07/26/2018    A1C test (Diabetic or Prediabetic)  02/16/2019    Annual Wellness Visit (AWV)  05/29/2019    Flu vaccine (Season Ended) 09/01/2020    Colon cancer screen colonoscopy  02/20/2021    Lipid screen  07/22/2021    Pneumococcal 0-64 years Vaccine  Completed    HIV screen  Completed    Hepatitis A vaccine  Aged Out    Hepatitis B vaccine  Aged Out    Hib vaccine  Aged Out    Meningococcal (ACWY) vaccine  Aged Out       Hemoglobin A1C (%)   Date Value   02/16/2018 5.8   05/14/2012 5.8             ( goal A1C is < 7)   No results found for: LABMICR  LDL Cholesterol (mg/dL)   Date Value   07/22/2016 104       (goal LDL is <100)   AST (U/L)   Date Value   07/26/2017 43 (H)   07/26/2017 43 (H)     ALT (U/L)   Date Value   07/26/2017 52 (H)   07/26/2017 52 (H)     BUN (mg/dL)   Date Value   07/26/2017 13   07/26/2017 13     BP Readings from Last 3 Encounters:   02/06/19 135/88   08/31/18 139/80   08/02/18 135/88          (goal 120/80)          Patient Active Problem List:     Essential hypertension     Sickle cell trait (HCC)     Primary osteoarthritis of knee     Portal vein thrombosis, june 2017, completed course of warfarin      Gastroesophageal reflux disease without esophagitis     Chronic pain of right ankle

## 2020-05-12 ENCOUNTER — VIRTUAL VISIT (OUTPATIENT)
Dept: INTERNAL MEDICINE | Age: 55
End: 2020-05-12
Payer: MEDICARE

## 2020-05-12 VITALS
SYSTOLIC BLOOD PRESSURE: 170 MMHG | BODY MASS INDEX: 28.7 KG/M2 | WEIGHT: 205 LBS | DIASTOLIC BLOOD PRESSURE: 83 MMHG | HEIGHT: 71 IN

## 2020-05-12 PROCEDURE — G2012 BRIEF CHECK IN BY MD/QHP: HCPCS | Performed by: INTERNAL MEDICINE

## 2020-05-12 RX ORDER — CLOTRIMAZOLE AND BETAMETHASONE DIPROPIONATE 10; .64 MG/G; MG/G
CREAM TOPICAL
COMMUNITY
End: 2021-02-25

## 2020-05-12 ASSESSMENT — PATIENT HEALTH QUESTIONNAIRE - PHQ9
1. LITTLE INTEREST OR PLEASURE IN DOING THINGS: 0
SUM OF ALL RESPONSES TO PHQ QUESTIONS 1-9: 0
SUM OF ALL RESPONSES TO PHQ9 QUESTIONS 1 & 2: 0
SUM OF ALL RESPONSES TO PHQ QUESTIONS 1-9: 0
2. FEELING DOWN, DEPRESSED OR HOPELESS: 0

## 2020-05-12 NOTE — PROGRESS NOTES
Thomas Ya Sr. is a 48 y.o. male evaluated via telephone on 5/12/2020. Consent:  He and/or health care decision maker is aware that that he may receive a bill for this telephone service, depending on his insurance coverage, and has provided verbal consent to proceed: Yes      Documentation:  Hypertension:  Home blood pressure monitoring: Yes - .. He is adherent to a low sodium diet. Patient denies chest pain and shortness of breath. Antihypertensive medication side effects: no medication side effects noted. Use of agents associated with hypertension: NSAIDS. Has been out of his medication. Takes 12.5mg metoprolol BID                                      Sodium (mmol/L)   Date Value   07/26/2017 135   07/26/2017 135    BUN (mg/dL)   Date Value   07/26/2017 13   07/26/2017 13    Glucose (mg/dL)   Date Value   07/26/2017 84   07/26/2017 84   05/08/2012 132 (H)      Potassium (mmol/L)   Date Value   07/26/2017 4.0   07/26/2017 4.0    CREATININE (mg/dL)   Date Value   07/26/2017 0.86   07/26/2017 0.86         GERD  Paitent complains of heartburn. This has been associated with no other symptoms. He denies abdominal bloating and bilious reflux. Symptoms have been present for several years. He denies dysphagia. He has not lost weight. He denies melena, hematochezia, hematemesis, and coffee ground emesis. Medical therapy in the past has included proton pump inhibitors. Continues to smoke     Diagnosis Orders   1. Pre-diabetes  Lipid Panel    Hemoglobin A1C   2. Essential (primary) hypertension   Lipid Panel   3. Gastroesophageal reflux disease without esophagitis         I affirm this is a Patient Initiated Episode with a Patient who has not had a related appointment within my department in the past 7 days or scheduled within the next 24 hours.     Patient identification was verified at the start of the visit: Yes    Total Time: minutes: 5-10 minutes    Note: not billable if this call serves to triage the patient

## 2020-05-14 RX ORDER — OMEPRAZOLE 20 MG/1
CAPSULE, DELAYED RELEASE ORAL
Qty: 30 CAPSULE | Refills: 5 | OUTPATIENT
Start: 2020-05-14

## 2020-05-14 RX ORDER — ACETAMINOPHEN/DIPHENHYDRAMINE 500MG-25MG
TABLET ORAL
Qty: 30 TABLET | Refills: 5 | OUTPATIENT
Start: 2020-05-14

## 2020-05-14 RX ORDER — IBUPROFEN 800 MG/1
TABLET ORAL
Qty: 90 TABLET | Refills: 5 | OUTPATIENT
Start: 2020-05-14

## 2020-05-14 RX ORDER — ASPIRIN 325 MG/1
TABLET, FILM COATED ORAL
Qty: 90 TABLET | OUTPATIENT
Start: 2020-05-14

## 2020-05-14 RX ORDER — GABAPENTIN 300 MG/1
CAPSULE ORAL
Qty: 90 CAPSULE | Refills: 5 | OUTPATIENT
Start: 2020-05-14

## 2020-05-14 RX ORDER — FOLIC ACID 1 MG/1
TABLET ORAL
Qty: 30 TABLET | Refills: 5 | OUTPATIENT
Start: 2020-05-14

## 2020-05-21 RX ORDER — IBUPROFEN 800 MG/1
800 TABLET ORAL EVERY 8 HOURS PRN
Qty: 90 TABLET | Refills: 5 | Status: SHIPPED | OUTPATIENT
Start: 2020-05-21

## 2020-05-21 RX ORDER — GABAPENTIN 300 MG/1
300 CAPSULE ORAL 3 TIMES DAILY
Qty: 90 CAPSULE | Refills: 5 | Status: SHIPPED | OUTPATIENT
Start: 2020-05-21 | End: 2021-10-19 | Stop reason: SDUPTHER

## 2020-05-21 RX ORDER — ASPIRIN 81 MG/1
81 TABLET ORAL DAILY
Qty: 30 TABLET | Refills: 5 | Status: SHIPPED | OUTPATIENT
Start: 2020-05-21 | End: 2021-10-19 | Stop reason: SDUPTHER

## 2020-05-21 RX ORDER — LANOLIN ALCOHOL/MO/W.PET/CERES
100 CREAM (GRAM) TOPICAL DAILY
Qty: 30 TABLET | Refills: 5 | Status: SHIPPED | OUTPATIENT
Start: 2020-05-21 | End: 2021-09-01 | Stop reason: SDUPTHER

## 2020-05-21 RX ORDER — FOLIC ACID 1 MG/1
1000 TABLET ORAL DAILY
Qty: 30 TABLET | Refills: 5 | Status: SHIPPED | OUTPATIENT
Start: 2020-05-21 | End: 2021-09-01 | Stop reason: SDUPTHER

## 2020-05-21 RX ORDER — OMEPRAZOLE 20 MG/1
20 CAPSULE, DELAYED RELEASE ORAL DAILY
Qty: 30 CAPSULE | Refills: 5 | Status: SHIPPED | OUTPATIENT
Start: 2020-05-21 | End: 2021-10-19 | Stop reason: SDUPTHER

## 2020-07-23 ENCOUNTER — VIRTUAL VISIT (OUTPATIENT)
Dept: INTERNAL MEDICINE | Age: 55
End: 2020-07-23
Payer: MEDICARE

## 2020-07-23 PROCEDURE — 99441 PR PHYS/QHP TELEPHONE EVALUATION 5-10 MIN: CPT | Performed by: INTERNAL MEDICINE

## 2020-08-28 ENCOUNTER — TELEPHONE (OUTPATIENT)
Dept: INTERNAL MEDICINE | Age: 55
End: 2020-08-28

## 2020-08-28 NOTE — LETTER
JAYLA [address]den [address]  [address] [facility] [address]  Phone: [phone]  Fax: [phone]    Rubina Colvin MD        August 28, 2020    [address] [place] [address]      Dear Gil Haney: We are sending this letter because your PCP ordered [facility] for you to have done at your last visit here and they have not yet been completed. If you can please come to our office on the 2nd floor to  your orders to have them compelted. If you do not have a follow-up appointment scheduled you can either contact the office to make an appointment with us or you can make one when you come in to pick-up your orders. If you have any questions or concerns, please don't hesitate to call.     Sincerely,        Rubina Colvin MD

## 2020-10-04 ENCOUNTER — HOSPITAL ENCOUNTER (OUTPATIENT)
Age: 55
Discharge: HOME | End: 2020-10-04
Payer: MEDICARE

## 2020-10-04 LAB
ALBUMIN SERPL-MCNC: 3.8 G/DL (ref 3.5–5.2)
ALBUMIN/GLOBULIN RATIO: 1.2 (ref 1–2.5)
ALP BLD-CCNC: 74 U/L (ref 40–129)
ALT SERPL-CCNC: 15 U/L (ref 5–41)
ANION GAP SERPL CALCULATED.3IONS-SCNC: 9 MMOL/L (ref 9–17)
AST SERPL-CCNC: 14 U/L
BILIRUB SERPL-MCNC: 0.75 MG/DL (ref 0.3–1.2)
BUN BLDV-MCNC: 13 MG/DL (ref 6–20)
BUN/CREAT BLD: ABNORMAL (ref 9–20)
CALCIUM SERPL-MCNC: 9 MG/DL (ref 8.6–10.4)
CHLORIDE BLD-SCNC: 109 MMOL/L (ref 98–107)
CHOLESTEROL, FASTING: 154 MG/DL
CHOLESTEROL/HDL RATIO: 3.3
CO2: 22 MMOL/L (ref 20–31)
CREAT SERPL-MCNC: 0.96 MG/DL (ref 0.7–1.2)
ESTIMATED AVERAGE GLUCOSE: 120 MG/DL
GFR AFRICAN AMERICAN: >60 ML/MIN
GFR NON-AFRICAN AMERICAN: >60 ML/MIN
GFR SERPL CREATININE-BSD FRML MDRD: ABNORMAL ML/MIN/{1.73_M2}
GFR SERPL CREATININE-BSD FRML MDRD: ABNORMAL ML/MIN/{1.73_M2}
GLUCOSE BLD-MCNC: 98 MG/DL (ref 70–99)
HBA1C MFR BLD: 5.8 % (ref 4–6)
HDLC SERPL-MCNC: 47 MG/DL
LDL CHOLESTEROL: 95 MG/DL (ref 0–130)
POTASSIUM SERPL-SCNC: 4.6 MMOL/L (ref 3.7–5.3)
SODIUM BLD-SCNC: 140 MMOL/L (ref 135–144)
TOTAL PROTEIN: 7.1 G/DL (ref 6.4–8.3)
TRIGLYCERIDE, FASTING: 60 MG/DL
VLDLC SERPL CALC-MCNC: NORMAL MG/DL (ref 1–30)

## 2020-10-04 PROCEDURE — 83036 HEMOGLOBIN GLYCOSYLATED A1C: CPT

## 2020-10-04 PROCEDURE — 80061 LIPID PANEL: CPT

## 2020-10-04 PROCEDURE — 80053 COMPREHEN METABOLIC PANEL: CPT

## 2020-11-05 ENCOUNTER — TELEPHONE (OUTPATIENT)
Dept: INTERNAL MEDICINE | Age: 55
End: 2020-11-05

## 2020-11-05 RX ORDER — AMOXICILLIN 500 MG/1
500 CAPSULE ORAL 3 TIMES DAILY
Qty: 21 CAPSULE | Refills: 0 | Status: SHIPPED | OUTPATIENT
Start: 2020-11-05 | End: 2020-11-12

## 2021-02-25 ENCOUNTER — OFFICE VISIT (OUTPATIENT)
Dept: INTERNAL MEDICINE | Age: 56
End: 2021-02-25
Payer: MEDICARE

## 2021-02-25 VITALS
SYSTOLIC BLOOD PRESSURE: 135 MMHG | HEART RATE: 64 BPM | TEMPERATURE: 97 F | BODY MASS INDEX: 28.39 KG/M2 | DIASTOLIC BLOOD PRESSURE: 70 MMHG | HEIGHT: 72 IN | WEIGHT: 209.6 LBS

## 2021-02-25 DIAGNOSIS — I10 ESSENTIAL HYPERTENSION: Primary | ICD-10-CM

## 2021-02-25 DIAGNOSIS — Z72.0 TOBACCO ABUSE: ICD-10-CM

## 2021-02-25 DIAGNOSIS — K21.9 GASTROESOPHAGEAL REFLUX DISEASE WITHOUT ESOPHAGITIS: ICD-10-CM

## 2021-02-25 DIAGNOSIS — B35.6 JOCK ITCH: ICD-10-CM

## 2021-02-25 DIAGNOSIS — E66.3 OVERWEIGHT (BMI 25.0-29.9): ICD-10-CM

## 2021-02-25 DIAGNOSIS — Z11.59 ENCOUNTER FOR HEPATITIS C SCREENING TEST FOR LOW RISK PATIENT: ICD-10-CM

## 2021-02-25 DIAGNOSIS — K08.9 POOR DENTITION: ICD-10-CM

## 2021-02-25 PROCEDURE — 99214 OFFICE O/P EST MOD 30 MIN: CPT | Performed by: INTERNAL MEDICINE

## 2021-02-25 RX ORDER — CLOTRIMAZOLE AND BETAMETHASONE DIPROPIONATE 10; .64 MG/G; MG/G
CREAM TOPICAL
Qty: 45 G | Refills: 1 | Status: SHIPPED | OUTPATIENT
Start: 2021-02-25 | End: 2021-06-24

## 2021-02-25 SDOH — ECONOMIC STABILITY: FOOD INSECURITY: WITHIN THE PAST 12 MONTHS, THE FOOD YOU BOUGHT JUST DIDN'T LAST AND YOU DIDN'T HAVE MONEY TO GET MORE.: NEVER TRUE

## 2021-02-25 SDOH — ECONOMIC STABILITY: TRANSPORTATION INSECURITY
IN THE PAST 12 MONTHS, HAS LACK OF TRANSPORTATION KEPT YOU FROM MEETINGS, WORK, OR FROM GETTING THINGS NEEDED FOR DAILY LIVING?: NO

## 2021-02-25 SDOH — HEALTH STABILITY: MENTAL HEALTH: HOW MANY STANDARD DRINKS CONTAINING ALCOHOL DO YOU HAVE ON A TYPICAL DAY?: NOT ASKED

## 2021-02-25 SDOH — HEALTH STABILITY: MENTAL HEALTH: HOW OFTEN DO YOU HAVE A DRINK CONTAINING ALCOHOL?: 2-4 TIMES A MONTH

## 2021-02-25 SDOH — ECONOMIC STABILITY: FOOD INSECURITY: WITHIN THE PAST 12 MONTHS, YOU WORRIED THAT YOUR FOOD WOULD RUN OUT BEFORE YOU GOT MONEY TO BUY MORE.: NEVER TRUE

## 2021-02-25 ASSESSMENT — PATIENT HEALTH QUESTIONNAIRE - PHQ9
SUM OF ALL RESPONSES TO PHQ QUESTIONS 1-9: 0
SUM OF ALL RESPONSES TO PHQ QUESTIONS 1-9: 0
2. FEELING DOWN, DEPRESSED OR HOPELESS: 0

## 2021-02-25 ASSESSMENT — ENCOUNTER SYMPTOMS
CONSTIPATION: 0
WHEEZING: 0
BACK PAIN: 0
DIARRHEA: 0
SHORTNESS OF BREATH: 0
ABDOMINAL PAIN: 0

## 2021-02-25 NOTE — PROGRESS NOTES
[facility]   Progress Note        Date of patient's visit: 2/25/2021  Patient's Name:  Dianna Raya Sr. YOB: 1965        PCP:  Joseph Ferguson MD    Dianna Raya Sr. is a 50 y.o. male who presents for   Chief Complaint   Patient presents with    Hypertension    and follow up of chronic medical problems. Patient Active Problem List   Diagnosis    Essential hypertension    Sickle cell trait (HCC)    Primary osteoarthritis of knee    Portal vein thrombosis, june 2017, completed course of warfarin     Gastroesophageal reflux disease without esophagitis    Chronic pain of right ankle       HISTORY OF PRESENT ILLNESS:    History was obtained from the patient. GERD  Kennedytent complains of heartburn. This has been associated with no other symptoms. He denies abdominal bloating and belching. Symptoms have been present for several months. He denies dysphagia. He has not lost weight. He denies melena, hematochezia, hematemesis, and coffee ground emesis. Medical therapy in the past has included proton pump inhibitors. Hypertension:  Home blood pressure monitoring: No.  He is adherent to a low sodium diet. Patient denies chest pain and shortness of breath. Antihypertensive medication side effects: no medication side effects noted. Use of agents associated with hypertension: none. Sodium (mmol/L)   Date Value   10/04/2020 140    BUN (mg/dL)   Date Value   10/04/2020 13    Glucose (mg/dL)   Date Value   10/04/2020 98   05/08/2012 132 (H)      Potassium (mmol/L)   Date Value   10/04/2020 4.6    CREATININE (mg/dL)   Date Value   10/04/2020 0.96         Rash: Dianna Raya Sr. is a 50 y.o. male who presents with a several month history of a localized rash. Rash first presented on the Groin area and has not spread. Rash is red and is pruritic. Associated symptoms include none. Patient has tried nothing. New exposures: none. Hypertension     Osteoarthritis      Past Surgical History:   Procedure Laterality Date    ACHILLES TENDON SURGERY  2010    right foot    COLONOSCOPY  02/20/2018    The mucosal exam was normal. Mild pan-diverticulosis was noted. A 13 mm Makenzie IIa lesion was noted in transverse colon. Lifting was achieved by submucosal injection of saline. The polyp was resected using hot snare. Two hemoclips were placed to close mucosal defect. A 4 mm polyp was noted in descending colon- removed by cold biopsy    DE COLONOSCOPY W/BIOPSY SINGLE/MULTIPLE N/A 2/20/2018    COLONOSCOPY WITH BIOPSY performed by Paco Wilde MD at [facility]      Social History     Tobacco Use    Smoking status: Current Every Day Smoker     Packs/day: 1.00     Years: 31.00     Pack years: 31.00     Types: Cigarettes    Smokeless tobacco: Never Used   Substance Use Topics    Alcohol use: Yes     Alcohol/week: 0.0 standard drinks     Types: 1 - 2 Cans of beer per week     Comment: occassional     Ct [address]  reports that he has been smoking cigarettes. He has a 31.00 pack-year smoking history. He has never used smokeless tobacco.    FAMILY HISTORY:      Family History   Problem Relation Age of Onset    Stroke Mother        REVIEW OF SYSTEMS:    Review of Systems   Constitutional: Negative for chills, fatigue and fever. Respiratory: Negative for shortness of breath and wheezing. Cardiovascular: Negative for chest pain and palpitations. Gastrointestinal: Negative for abdominal pain, constipation and diarrhea. Genitourinary: Negative for frequency and genital sores. Musculoskeletal: Negative for arthralgias, back pain and gait problem. Skin: Positive for rash. Neurological: Negative for dizziness. Psychiatric/Behavioral: Negative for agitation and confusion. The patient is not nervous/anxious.         PHYSICAL EXAM:      Vitals:    02/25/21 1355   BP: 135/70   Pulse: 64   Temp:      BP Readings from Last 3 clotrimazole-betamethasone (LOTRISONE) 1-0.05 % cream   5. Encounter for hepatitis C screening test for low risk patient  Hepatitis C Antibody   6. Tobacco abuse     7. Poor dentition  External Referral To Oral Maxillofacial Surgery   Patient's hypertension is well controlled and patient is to continue on the metoprolol 12-1/2 mg twice daily. He continues to take Prilosec as needed and does not use it regularly and symptoms are stable. Did discuss weight loss with lifestyle and dietary modifications. Patient was counseled on tobacco cessation. Based upon patient's motivation to change his behavior, the following plan was agreed upon: patient will think about his/her triggers for using tobacco.  He was provided with a list of local tobacco cessation resources. Provider spent 10 minutes counseling patient. FOLLOW UP:   1. Víctor Glez received counseling on the following healthy behaviors: nutrition and exercise    2. Reviewed prior labs and health maintenance. 3.  Discussed use, benefit, and side effects of prescribed medications. Barriers to medication compliance addressed. All patient questions answered. Pt voiced understanding. 4.  Continue current medications, diet and exercise. Orders Placed This Encounter   Medications    clotrimazole-betamethasone (LOTRISONE) 1-0.05 % cream     Sig: Apply topically 2 times daily. Dispense:  45 g     Refill:  1          Completed Refills               Requested Prescriptions     Signed Prescriptions Disp Refills    clotrimazole-betamethasone (LOTRISONE) 1-0.05 % cream 45 g 1     Sig: Apply topically 2 times daily. 5. Patient given educational materials - see patient instructions    6. Was a self-tracking handout given in paper form or via [employer]? Yes  If yes, see orders or list here. Orders Placed This Encounter   Procedures    Hepatitis C Antibody     Please get this labwork done before your next visit.      Standing Status:

## 2021-02-25 NOTE — PATIENT INSTRUCTIONS
Medications e-scribe to pharmacy of pt's choice. Script for lab given to pt, no fasting required. Pt will get labs done as soon as possible. Patient to return to clinic 6 months (office will call and schedule appt). AVS reviewed and given to pt. It is very important for your care that you keep your appointment. If for some reason you are unable to keep your appointment it is equally important that you call our office at [phone] to cancel your appointment and reschedule. Failure to do so may result in your termination from our practice. MB    Referral to Oral Maxil Facial Surgery referral given to patient.     [facility] Oral & maxillofacial Surgeons  [address] [facility], [address] [facility]  Phone # [phone]      [facility]  [address] [facility], [address]  Phone # [phone]

## 2021-06-02 ENCOUNTER — TELEPHONE (OUTPATIENT)
Dept: INTERNAL MEDICINE | Age: 56
End: 2021-06-02

## 2021-06-02 NOTE — LETTER
JAYLA Hayden [address]  [address] [facility] [address]  Phone: [phone]  Fax: [phone]    Johann Salomon MD        June 2, 2021    [address] [place] [address]      Dear Laurie Jacobson: This letter is a reminder that you may have diagnostic testing that has not been completed. It is important to your well-being that these test(s) are performed. Please find the outstanding test(s) attached. If you could please have these completed before your next appointment. You can have the test completed at any [facility] facility or Lab. Please see the order for scheduling instructions. Any testing that needs completed other than blood work or xray's please call [phone] to schedule an appointment. Otherwise can be done at any [facility]. Please call our office at Dept: [phone] for additional information on the outstanding tests or let us know if they have been completed so we may update your chart. If you have any questions or concerns, please don't hesitate to call.     Sincerely,        Johann Salomon MD

## 2021-06-16 ENCOUNTER — OFFICE VISIT (OUTPATIENT)
Dept: INTERNAL MEDICINE | Age: 56
End: 2021-06-16
Payer: MEDICARE

## 2021-06-16 VITALS
SYSTOLIC BLOOD PRESSURE: 159 MMHG | HEART RATE: 65 BPM | DIASTOLIC BLOOD PRESSURE: 85 MMHG | BODY MASS INDEX: 28.14 KG/M2 | WEIGHT: 201 LBS | HEIGHT: 71 IN

## 2021-06-16 DIAGNOSIS — I10 ESSENTIAL (PRIMARY) HYPERTENSION: ICD-10-CM

## 2021-06-16 DIAGNOSIS — M77.02 MEDIAL EPICONDYLITIS OF ELBOW, LEFT: Primary | ICD-10-CM

## 2021-06-16 PROCEDURE — 3017F COLORECTAL CA SCREEN DOC REV: CPT | Performed by: STUDENT IN AN ORGANIZED HEALTH CARE EDUCATION/TRAINING PROGRAM

## 2021-06-16 PROCEDURE — G8419 CALC BMI OUT NRM PARAM NOF/U: HCPCS | Performed by: STUDENT IN AN ORGANIZED HEALTH CARE EDUCATION/TRAINING PROGRAM

## 2021-06-16 PROCEDURE — 99213 OFFICE O/P EST LOW 20 MIN: CPT | Performed by: STUDENT IN AN ORGANIZED HEALTH CARE EDUCATION/TRAINING PROGRAM

## 2021-06-16 PROCEDURE — G8427 DOCREV CUR MEDS BY ELIG CLIN: HCPCS | Performed by: STUDENT IN AN ORGANIZED HEALTH CARE EDUCATION/TRAINING PROGRAM

## 2021-06-16 PROCEDURE — 99211 OFF/OP EST MAY X REQ PHY/QHP: CPT | Performed by: INTERNAL MEDICINE

## 2021-06-16 PROCEDURE — 4004F PT TOBACCO SCREEN RCVD TLK: CPT | Performed by: STUDENT IN AN ORGANIZED HEALTH CARE EDUCATION/TRAINING PROGRAM

## 2021-06-16 RX ORDER — ACETAMINOPHEN 500 MG
500 TABLET ORAL 4 TIMES DAILY PRN
Qty: 28 TABLET | Refills: 0 | Status: SHIPPED | OUTPATIENT
Start: 2021-06-16 | End: 2021-10-19 | Stop reason: ALTCHOICE

## 2021-06-16 NOTE — PROGRESS NOTES
Patient is here for medial elbow pain on the left. No swelling. He has a mechanical job. Has been going on for some time. He has been taking NSAIDs and Tylenol over-the-counter with not much relief. He would like to see Ortho. He has a history of hypertension, prediabetes, osteoarthritis, history of GI bleed in the past.  He likes to avoid NSAIDs. He has hypertension which is not controlled. He says he has not taken his medication today. It does not appear he has been taking his medications daily as reviewed by his dispense list.  Advised compliance. Follow-up in 4 to 6 weeks with PCP for blood pressure check and labs. Attending Physician Statement  I have discussed the care of Helen Knowles, including pertinent history and exam findings,  with the resident. I have reviewed the key elements of all parts of the encounter with the resident. I agree with the assessment, plan and orders as documented by the resident.   (GE Modifier)

## 2021-06-16 NOTE — PATIENT INSTRUCTIONS
Patient Education        Little Leaguer's Elbow (Medial Apophysitis): Exercises  Introduction  Here are some examples of exercises for you to try. The exercises may be suggested for a condition or for rehabilitation. Start each exercise slowly. Ease off the exercises if you start to have pain. You will be told when to start these exercises and which ones will work best for you. How to do the exercises  Wrist flexor stretch   1. Extend your affected arm in front of you. Your palm should face away from your body. 2. Bend back your wrist on your affected arm, pointing your hand up toward the ceiling. 3. With your other hand, gently bend your wrist farther until you feel a mild to moderate stretch in your forearm. 4. Hold for at least 15 to 30 seconds. 5. Repeat 2 to 4 times. 6. Repeat steps 1 through 5. But this time extend your affected arm in front of you with your palm facing up. Then bend back your wrist, pointing your hand toward the floor. Resisted wrist extension   For the following exercises, you will need elastic exercise material, such as surgical tubing or Thera-Band. 1. Sit leaning forward with your legs slightly spread. Then place your affected forearm on your thigh with your hand and wrist in front of your knee. 2. Grasp one end of an exercise band with your palm down. Step on the other end.  3. Slowly bend your wrist upward for a count of 2. Then lower your wrist slowly to a count of 5.  4. Repeat 8 to 12 times. Resisted wrist flexion   1. Sit leaning forward with your legs slightly spread. Then place your affected forearm on your thigh with your hand and wrist in front of your knee. 2. Grasp one end of an exercise band with your palm up. Step on the other end.  3. Slowly bend your wrist upward for a count of 2. Then lower your wrist slowly to a count of 5.  4. Repeat 8 to 12 times. Resisted radial deviation   1. Sit leaning forward with your legs slightly spread.  Then place your affected forearm on your thigh with your hand and wrist in front of your knee. 2. Grasp one end of an exercise band with your hand facing toward your other thigh. Step on the other end of the band. 3. Slowly bend your wrist upward for a count of 2. Then lower your wrist slowly to a count of 5.  4. Repeat 8 to 12 times. Resisted ulnar deviation   1. Sit leaning forward with your legs slightly spread. Then place your affected forearm on your thigh with your hand and wrist by the inside of your knee. 2. Grasp one end of an exercise band with your palm down. Step on the other end with the foot opposite the hand holding the band. 3. Slowly bend your wrist outward and toward your knee for a count of 2. Then slowly move your wrist back to the starting position to a count of 5.  4. Repeat 8 to 12 times. Resisted forearm supination   1. Sit leaning forward with your legs slightly spread. Then place your affected forearm on your thigh with your hand and wrist in front of your knee. 2. Grasp one end of an exercise band with your palm down. Step on the other end. 3. Keeping your wrist straight, roll your palm outward and away from the inside of your thigh for a count of 2. Then slowly move your wrist back to the starting position to a count of 5.  4. Repeat 8 to 12 times. Resisted forearm pronation   1. Sit leaning forward with your legs slightly spread. Then place your affected forearm on your thigh with your hand and wrist in front of your knee. 2. Grasp one end of an exercise band with your palm up. Step on the other end. 3. Keeping your wrist straight, roll your palm inward toward your thigh for a count of 2. Then slowly move your wrist back to the starting position to a count of 5.  4. Repeat 8 to 12 times. Follow-up care is a key part of your treatment and safety. Be sure to make and go to all appointments, and call your doctor if you are having problems.  It's also a good idea to know your test results and keep a list of the medicines you take. Where can you learn more? Go to [email or web address] and sign in to your [email or web address] account. Enter C068 in the [facility] box to learn more about \"Little Leaguer's Elbow (Medial Apophysitis): Exercises. \"     If you do not have an account, please click on the \"Sign Up Now\" link. Current as of: November 16, 2020               Content Version: 12.9  © 2006-2021 [employer]. Care instructions adapted under license by [facility] ([facility]). If you have questions about a medical condition or this instruction, always ask your healthcare professional. [address] any warranty or liability for your use of this information. Patient Education        Golfer's Elbow: Exercises  Introduction  Here are some examples of exercises for you to try. The exercises may be suggested for a condition or for rehabilitation. Start each exercise slowly. Ease off the exercises if you start to have pain. You will be told when to start these exercises and which ones will work best for you. How to do the exercises  Wrist extensor stretch   7. Extend your affected arm in front of you and make a fist with your palm facing down. 8. Bend your wrist so that your fist points toward the floor. 9. With your other hand, gently bend your wrist farther until you feel a mild to moderate stretch in your forearm. 10. Hold for at least 15 to 30 seconds. 11. Repeat 2 to 4 times. 12. Repeat steps 1 through 5 with your fingers pointing toward the floor. Forearm extensor stretch   5. Place your affected elbow down at your side, bent at about 90 degrees. Then make a fist with your palm facing down. 6. Keeping your wrist bent, slowly straighten your elbow so your arm is down at your side. Then twist your fist out so your palm is facing out to the side and you feel a stretch. 7. Hold for at least 15 to 30 seconds. 8. Repeat 2 to 4 times. Wrist flexor stretch   5. Extend your affected arm in front of you with your palm facing away from your body. 6. Bend back your wrist, pointing your hand up toward the ceiling. 7. With your other hand, gently bend your wrist farther until you feel a mild to moderate stretch in your forearm. 8. Hold for at least 15 to 30 seconds. 9. Repeat 2 to 4 times. 10. Repeat steps 1 through 5, but this time extend your affected arm in front of you with your palm facing up. Then bend back your wrist, pointing your hand toward the floor. Wrist curls   5. Place your forearm on a table with your hand hanging over the edge of the table, palm up. 6. Place a 1- to 2-pound weight in your hand. This may be a dumbbell, a can of food, or a filled water bottle. 7. Slowly raise and lower the weight while keeping your forearm on the table and your palm facing up. 8. Repeat this motion 8 to 12 times. 9. Switch arms, and do steps 1 through 4.  10. Repeat with your hand facing down toward the floor. Switch arms. Resisted wrist extension   5. Sit leaning forward with your legs slightly spread. Then place your affected forearm on your thigh with your hand and wrist in front of your knee. 6. Grasp one end of an exercise band with your palm down, and step on the other end.  7. Slowly bend your wrist upward for a count of 2, then lower your wrist slowly to a count of 5.  8. Repeat 8 to 12 times. Resisted wrist flexion   5. Sit leaning forward with your legs slightly spread. Then place your affected forearm on your thigh with your hand and wrist in front of your knee. 6. Grasp one end of an exercise band with your palm up, and step on the other end.  7. Slowly bend your wrist upward for a count of 2, then lower your wrist slowly to a count of 5.  8. Repeat 8 to 12 times. Neck stretch to the side   5. This stretch works best if you keep your shoulder down as you lean away from it.  To help you remember to do this, start by learn more about \"Golfer's Elbow: Exercises. \"     If you do not have an account, please click on the \"Sign Up Now\" link. Current as of: November 16, 2020               Content Version: 12.9  © 2006-2021 [employer]. Care instructions adapted under license by [facility] ([facility]). If you have questions about a medical condition or this instruction, always ask your healthcare professional. [address] any warranty or liability for your use of this information. Patient Education        Golfer's Elbow: Exercises  Introduction  Here are some examples of exercises for you to try. The exercises may be suggested for a condition or for rehabilitation. Start each exercise slowly. Ease off the exercises if you start to have pain. You will be told when to start these exercises and which ones will work best for you. How to do the exercises  Wrist extensor stretch   13. Extend your affected arm in front of you and make a fist with your palm facing down. [address] your wrist so that your fist points toward the floor. 15. With your other hand, gently bend your wrist farther until you feel a mild to moderate stretch in your forearm. 16. Hold for at least 15 to 30 seconds. 17. Repeat 2 to 4 times. 18. Repeat steps 1 through 5 with your fingers pointing toward the floor. Forearm extensor stretch   9. Place your affected elbow down at your side, bent at about 90 degrees. Then make a fist with your palm facing down. 10. Keeping your wrist bent, slowly straighten your elbow so your arm is down at your side. Then twist your fist out so your palm is facing out to the side and you feel a stretch. 11. Hold for at least 15 to 30 seconds. 12. Repeat 2 to 4 times. Wrist flexor stretch   11. Extend your affected arm in front of you with your palm facing away from your body. 12. Bend back your wrist, pointing your hand up toward the ceiling.   13. With your other hand, gently bend your wrist farther until you feel a mild to moderate stretch in your forearm. 14. Hold for at least 15 to 30 seconds. 15. Repeat 2 to 4 times. 16. Repeat steps 1 through 5, but this time extend your affected arm in front of you with your palm facing up. Then bend back your wrist, pointing your hand toward the floor. Wrist curls   11. Place your forearm on a table with your hand hanging over the edge of the table, palm up. 12. Place a 1- to 2-pound weight in your hand. This may be a dumbbell, a can of food, or a filled water bottle. 13. Slowly raise and lower the weight while keeping your forearm on the table and your palm facing up. 14. Repeat this motion 8 to 12 times. 15. Switch arms, and do steps 1 through 4.  16. Repeat with your hand facing down toward the floor. Switch arms. Resisted wrist extension   9. Sit leaning forward with your legs slightly spread. Then place your affected forearm on your thigh with your hand and wrist in front of your knee. 10. Grasp one end of an exercise band with your palm down, and step on the other end. 11. Slowly bend your wrist upward for a count of 2, then lower your wrist slowly to a count of 5.  12. Repeat 8 to 12 times. Resisted wrist flexion   9. Sit leaning forward with your legs slightly spread. Then place your affected forearm on your thigh with your hand and wrist in front of your knee. 10. Grasp one end of an exercise band with your palm up, and step on the other end. 11. Slowly bend your wrist upward for a count of 2, then lower your wrist slowly to a count of 5.  12. Repeat 8 to 12 times. Neck stretch to the side   10. This stretch works best if you keep your shoulder down as you lean away from it. To help you remember to do this, start by relaxing your shoulders and lightly holding on to your thighs or your chair. 11. Tilt your head away from your affected elbow and toward your opposite shoulder.  For example, if your right elbow is sore, keep your right

## 2021-06-16 NOTE — PROGRESS NOTES
[facility] PHYSICIANS  [facility] [address] [address]  Dept: [phone]  Dept Fax: [phone]    Office Progress/Follow Up Note  Date of patient's visit: 6/16/2021  Patient's Name:  Lynn Mae. YOB: 1965            Patient Care Team:  Johann Salomon MD as PCP - General (Internal Medicine)  Johann Salomon MD as PCP - [facility]  Caitlyn Kuhn MD as Consulting Physician (Vascular Surgery)  Mohsen Carlos MD as Surgeon (General Surgery: [facility])  Iva Staton MD as Consulting Physician (Infectious Diseases)  Summer Mejia MD as Consulting Physician (Gastroenterology)  Caitlyn Kuhn MD as Consulting Physician (Vascular Surgery)  ________________________________________________________________________      Reason for Visit: Same day visit  ________________________________________________________________________  Chief Complaint:  Arm Pain (elbo)    ________________________________________________________________________  History of Presenting Illness:  History was obtained from: patient, electronic medical record. Holden Dhruv Hernandez is a 50 y.o. is here for:    Left Elbow pain for a month on median side, constant pain, worse with activity, sharp pain, non radiating, tried bandages but did not help. Patient is left hand predominant and works as an . Patient Active Problem List   Diagnosis    Essential hypertension    Sickle cell trait ([address]    Primary osteoarthritis of knee    Portal vein thrombosis, june 2017, completed course of warfarin     Gastroesophageal reflux disease without esophagitis    Chronic pain of right ankle       No Known Allergies      Current Outpatient Medications   Medication Sig Dispense Refill    Multiple Vitamins-Minerals (ONE DAILY ADULTS 50+ PO) Take by mouth      clotrimazole-betamethasone (LOTRISONE) 1-0.05 % cream Apply topically 2 times daily.  45 g 1    ibuprofen (ADVIL;MOTRIN) 800 MG tablet Take 1 tablet by mouth every 8 hours as needed for Pain With food 90 tablet 5    thiamine 100 MG tablet Take 1 tablet by mouth daily 30 tablet 5    omeprazole (PRILOSEC) 20 MG delayed release capsule Take 1 capsule by mouth daily 30 capsule 5    aspirin (RA ASPIRIN EC ADULT LOW ST) 81 MG EC tablet Take 1 tablet by mouth daily 30 tablet 5    folic acid (FOLVITE) 1 MG tablet Take 1 tablet by mouth daily 30 tablet 5    metoprolol tartrate (LOPRESSOR) 25 MG tablet Take 0.5 tablets by mouth 2 times daily 30 tablet 5    gabapentin (NEURONTIN) 300 MG capsule Take 1 capsule by mouth 3 times daily for 30 days. 90 capsule 5     No current facility-administered medications for this visit.        Social History     Tobacco Use    Smoking status: Current Every Day Smoker     Packs/day: 1.00     Years: 31.00     Pack years: 31.00     Types: Cigarettes    Smokeless tobacco: Never Used   Substance Use Topics    Alcohol use: Yes     Types: 1 - 2 Cans of beer per week     Comment: occassional    Drug use: Yes     Frequency: 2.0 times per week     Types: Marijuana       Family History   Problem Relation Age of Onset    Stroke Mother       ________________________________________________________________________  Review of Systems:  CONSTITUTIONAL: Denies: fever, chills  PSYCH: Denies: anxiety, depression  ALLERGIES: Denies: urticaria  EYES: Denies: blurry vision, decreased vision, photophobia  ENT: Denies: sore throat, nasal congestion  CARDIOVASCULAR: Denies: chest pain, dyspnea on exertion  RESPIRATORY: Denies: cough, hemoptysis, shortness of breath  GI: Denies: Denies: abdominal pain, flank pain  : Denies: Denies: dysuria, frequency/urgency  NEURO: Denies: dizzy/vertigo, headache  MUSCULOSKELETAL: Denies: back pain, joint pain  SKIN: Denies: rash, itching  ________________________________________________________________________  Physical Exam:  Vitals:    06/16/21 1512 06/16/21 1520   BP: (!) 181/87 (!) 159/85   Site: Right Upper Arm Left Upper Arm   Position: Sitting Sitting   Cuff Size: Medium Adult Medium Adult   Pulse: 67 65   Weight: 201 lb (91.2 kg)    Height: 5' 11\" (1.803 m)      BP Readings from Last 3 Encounters:   06/16/21 (!) 159/85   02/25/21 135/70   05/12/20 (!) 170/83         General Examination    General Resting comfortably    Head Normocephalic, without obvious abnormality   Neck Supple, symmetrical. Good ROM. No midline or paraspinal tenderness. Extremities No cyanosis or edema or warmth. No swelling of the left elbow, tenderness on the medial epicondyles, pain elicited by pronation against resistance. No numbness or tingling or focal neurological deficits. No atrophy noted. Pulses 2+ and symmetric   Skin: Skin  turgor normal, no rashes or lesions   ________________________________________________________________________  Diagnostic findings:  CBC:  Lab Results   Component Value Date    WBC 7.8 07/26/2017    HGB 12.6 07/26/2017     07/26/2017       BMP:    Lab Results   Component Value Date     10/04/2020    K 4.6 10/04/2020     10/04/2020    CO2 22 10/04/2020    BUN 13 10/04/2020    CREATININE 0.96 10/04/2020    GLUCOSE 98 10/04/2020    GLUCOSE 132 05/08/2012       HEMOGLOBIN A1C:   Lab Results   Component Value Date    LABA1C 5.8 10/04/2020       FASTING LIPID PANEL:  Lab Results   Component Value Date    CHOL 159 07/22/2016    HDL 47 10/04/2020    TRIG 47 07/22/2016     ________________________________________________________________________  Health Maintenance:  Health Maintenance Due   Topic Date Due    Hepatitis C screen  Never done    COVID-19 Vaccine (1) Never done    Colon cancer screen colonoscopy  02/20/2021     ________________________________________________________________________  Assessment and Plan:    1.  Medial epicondylitis of elbow, left  -Continue ice pack and elbow brace  -Avoid activity  -Continue physical therapy exercises as tolerated as in the instructions. - acetaminophen (TYLENOL) 500 MG tablet; Take 1 tablet by mouth 4 times daily as needed for Pain  Dispense: 28 tablet; Refill: 0  - Elastic Bandages & Supports (ELBOW BRACE) MISC; 1 Units by Does not apply route daily  Dispense: 1 each; Refill: 0  - [facility]    2. Essential (primary) hypertension   - metoprolol tartrate (LOPRESSOR) 25 MG tablet; Take 0.5 tablets by mouth 2 times daily  Dispense: 30 tablet; Refill: 5        ________________________________________________________________________  Follow up and instructions:  · Follow up in 4 weeks for hypertension    · Pastor Jolly received counseling on the following healthy behaviors: medication compliance, diet and exercise    · Discussed use, benefit, and side effects of prescribed medications. Barriers to medication compliance addressed. All patient questions answered. Pt voiced understanding. · Patient given educational materials - see patient instructions    Bobby Starr MD  Internal Medicine Resident  [facility]  6/16/2021 3:39 PM        This note is created with the assistance of a speech-recognition program. While intending to generate a document that actually reflects the content of the visit, the document can still have some mistakes which may not have been identified and corrected by editing.

## 2021-06-23 DIAGNOSIS — B35.6 JOCK ITCH: ICD-10-CM

## 2021-06-24 RX ORDER — CLOTRIMAZOLE AND BETAMETHASONE DIPROPIONATE 10; .64 MG/G; MG/G
CREAM TOPICAL
Qty: 45 G | Refills: 1 | Status: SHIPPED | OUTPATIENT
Start: 2021-06-24 | End: 2021-10-19 | Stop reason: SDUPTHER

## 2021-06-24 NOTE — TELEPHONE ENCOUNTER
E-scribe request from [address] [place] [address] for Clotrimazole-betamethasone 1-0.05%. Patient is currently on the waitlist for an appt. Health Maintenance   Topic Date Due    Hepatitis C screen  Never done    COVID-19 Vaccine (1) Never done    Colon cancer screen colonoscopy  02/20/2021    Shingles Vaccine (1 of 2) 07/23/2021 (Originally 11/27/2017)    DTaP/Tdap/Td vaccine (1 - Tdap) 02/24/2022 (Originally 11/27/1986)    Flu vaccine (Season Ended) 02/25/2022 (Originally 9/1/2021)    Annual Wellness Visit (AWV)  02/25/2023 (Originally 5/29/2019)    A1C test (Diabetic or Prediabetic)  10/04/2021    Potassium monitoring  10/04/2021    Creatinine monitoring  10/04/2021    Lipid screen  10/04/2025    Pneumococcal 0-64 years Vaccine (2 of 2) 11/27/2032    HIV screen  Completed    Hepatitis A vaccine  Aged Out    Hepatitis B vaccine  Aged Out    Hib vaccine  Aged Out    Meningococcal (ACWY) vaccine  Aged Out             (applicable per patient's age: Cancer Screenings, Depression Screening, Fall Risk Screening, Immunizations)    Hemoglobin A1C (%)   Date Value   10/04/2020 5.8   02/16/2018 5.8   05/14/2012 5.8     LDL Cholesterol (mg/dL)   Date Value   10/04/2020 95     AST (U/L)   Date Value   10/04/2020 14     ALT (U/L)   Date Value   10/04/2020 15     BUN (mg/dL)   Date Value   10/04/2020 13      (goal A1C is < 7)   (goal LDL is <100) need 30-50% reduction from baseline     BP Readings from Last 3 Encounters:   06/16/21 (!) 159/85   02/25/21 135/70   05/12/20 (!) 170/83    (goal /80)      All Future Testing planned in [facility]:  Lab Frequency Next Occurrence   Hepatitis C Antibody Once 09/02/2021       Next Visit Date:  No future appointments.          Patient Active Problem List:     Essential hypertension     Sickle cell trait (HCC)     Primary osteoarthritis of knee     Portal vein thrombosis, june 2017, completed course of warfarin      Gastroesophageal reflux disease without esophagitis     Chronic pain of right ankle

## 2021-09-01 DIAGNOSIS — D57.3 SICKLE CELL TRAIT (HCC): ICD-10-CM

## 2021-09-01 DIAGNOSIS — I10 ESSENTIAL (PRIMARY) HYPERTENSION: ICD-10-CM

## 2021-09-01 RX ORDER — LANOLIN ALCOHOL/MO/W.PET/CERES
100 CREAM (GRAM) TOPICAL DAILY
Qty: 30 TABLET | Refills: 0 | Status: SHIPPED | OUTPATIENT
Start: 2021-09-01 | End: 2021-10-19 | Stop reason: SDUPTHER

## 2021-09-01 RX ORDER — FOLIC ACID 1 MG/1
1000 TABLET ORAL DAILY
Qty: 30 TABLET | Refills: 0 | Status: SHIPPED | OUTPATIENT
Start: 2021-09-01 | End: 2021-10-19 | Stop reason: SDUPTHER

## 2021-09-01 NOTE — TELEPHONE ENCOUNTER
----- Message from [facility] sent at 8/31/2021  1:11 PM EDT -----  Subject: Refill Request    QUESTIONS  Name of Medication? metoprolol tartrate (LOPRESSOR) 25 MG tablet  Patient-reported dosage and instructions? 2 times days a day  How many days do you have left? 2  Preferred Pharmacy? [facility] phone number (if available)? [phone]  ---------------------------------------------------------------------------  --------------  CALL BACK INFO  What is the best way for the office to contact you? OK to leave message on   voicemail  Preferred Call Back Phone Number?  [phone]

## 2021-09-01 NOTE — TELEPHONE ENCOUNTER
Request for Thiamine,Folic acid and Metoprolol. Pt overdue for appt-- Lm to contact office to alphonse mcbride/karissa on BP    Next Visit Date:  No future appointments.     Health Maintenance   Topic Date Due    Hepatitis C screen  Never done    COVID-19 Vaccine (1) Never done    Shingles Vaccine (1 of 2) Never done    Low dose CT lung screening  08/07/2018    Colon cancer screen colonoscopy  02/20/2021    Flu vaccine (1) Never done    DTaP/Tdap/Td vaccine (1 - Tdap) 02/24/2022 (Originally 11/27/1986)    Annual Wellness Visit (AWV)  02/25/2023 (Originally 5/29/2019)    A1C test (Diabetic or Prediabetic)  10/04/2021    Potassium monitoring  10/04/2021    Creatinine monitoring  10/04/2021    Lipid screen  10/04/2025    Pneumococcal 0-64 years Vaccine (2 of 2 - PPSV23) 11/27/2032    HIV screen  Completed    Hepatitis A vaccine  Aged Out    Hepatitis B vaccine  Aged Out    Hib vaccine  Aged Out    Meningococcal (ACWY) vaccine  Aged Out       Hemoglobin A1C (%)   Date Value   10/04/2020 5.8   02/16/2018 5.8   05/14/2012 5.8             ( goal A1C is < 7)   No results found for: LABMICR  LDL Cholesterol (mg/dL)   Date Value   10/04/2020 95       (goal LDL is <100)   AST (U/L)   Date Value   10/04/2020 14     ALT (U/L)   Date Value   10/04/2020 15     BUN (mg/dL)   Date Value   10/04/2020 13     BP Readings from Last 3 Encounters:   06/16/21 (!) 159/85   02/25/21 135/70   05/12/20 (!) 170/83          (goal 120/80)    All Future Testing planned in [facility]  Lab Frequency Next Occurrence   Hepatitis C Antibody Once 09/02/2021         Patient Active Problem List:     Essential hypertension     Sickle cell trait (HCC)     Primary osteoarthritis of knee     Portal vein thrombosis, june 2017, completed course of warfarin      Gastroesophageal reflux disease without esophagitis     Chronic pain of right ankle

## 2021-09-10 ENCOUNTER — TELEPHONE (OUTPATIENT)
Dept: INTERNAL MEDICINE | Age: 56
End: 2021-09-10

## 2021-09-10 NOTE — LETTER
JAYLA Hayden 43  [address] [facility] [address]  Phone: [phone]  Fax: [phone]    Star Ramesh MD        September 10, 2021    [address] [place] [id number]      Dear Lori Kendrick: This letter is a reminder that you may have diagnostic testing that has not been completed. It is important to your well-being that these test(s) are performed. Please find the outstanding test(s) attached. If you could please have these completed before your next appointment. You can have the test completed at any [facility] facility or Lab. Please see the order for scheduling instructions. Any testing that needs completed other than blood work or xray's please call [phone] to schedule an appointment. Otherwise can be done at any [facility]. Please call our office at Dept: [phone] for additional information on the outstanding tests or let us know if they have been completed so we may update your chart. If you have any questions or concerns, please don't hesitate to call.     Sincerely,        Star Ramesh MD

## 2021-10-19 ENCOUNTER — HOSPITAL ENCOUNTER (OUTPATIENT)
Age: 56
Setting detail: SPECIMEN
Discharge: HOME | End: 2021-10-19
Payer: MEDICARE

## 2021-10-19 ENCOUNTER — OFFICE VISIT (OUTPATIENT)
Dept: INTERNAL MEDICINE | Age: 56
End: 2021-10-19
Payer: MEDICARE

## 2021-10-19 VITALS
DIASTOLIC BLOOD PRESSURE: 77 MMHG | HEIGHT: 72 IN | WEIGHT: 198.2 LBS | BODY MASS INDEX: 26.84 KG/M2 | HEART RATE: 74 BPM | SYSTOLIC BLOOD PRESSURE: 139 MMHG

## 2021-10-19 DIAGNOSIS — N52.9 ERECTILE DYSFUNCTION, UNSPECIFIED ERECTILE DYSFUNCTION TYPE: ICD-10-CM

## 2021-10-19 DIAGNOSIS — M89.9 DISORDER OF BONE, UNSPECIFIED: ICD-10-CM

## 2021-10-19 DIAGNOSIS — R79.9 ABNORMAL FINDING OF BLOOD CHEMISTRY, UNSPECIFIED: ICD-10-CM

## 2021-10-19 DIAGNOSIS — Z11.59 ENCOUNTER FOR HEPATITIS C SCREENING TEST FOR LOW RISK PATIENT: ICD-10-CM

## 2021-10-19 DIAGNOSIS — D57.3 SICKLE CELL TRAIT (HCC): ICD-10-CM

## 2021-10-19 DIAGNOSIS — D53.9 NUTRITIONAL ANEMIA, UNSPECIFIED: ICD-10-CM

## 2021-10-19 DIAGNOSIS — I10 ESSENTIAL (PRIMARY) HYPERTENSION: Primary | ICD-10-CM

## 2021-10-19 DIAGNOSIS — F17.200 SMOKER: ICD-10-CM

## 2021-10-19 DIAGNOSIS — M25.571 CHRONIC PAIN OF RIGHT ANKLE: ICD-10-CM

## 2021-10-19 DIAGNOSIS — G89.29 CHRONIC PAIN OF RIGHT ANKLE: ICD-10-CM

## 2021-10-19 DIAGNOSIS — Z87.891 PERSONAL HISTORY OF TOBACCO USE: ICD-10-CM

## 2021-10-19 DIAGNOSIS — Z12.11 COLON CANCER SCREENING: ICD-10-CM

## 2021-10-19 DIAGNOSIS — B35.6 JOCK ITCH: ICD-10-CM

## 2021-10-19 DIAGNOSIS — M17.0 PRIMARY OSTEOARTHRITIS OF BOTH KNEES: ICD-10-CM

## 2021-10-19 DIAGNOSIS — K21.9 GASTROESOPHAGEAL REFLUX DISEASE WITHOUT ESOPHAGITIS: ICD-10-CM

## 2021-10-19 DIAGNOSIS — Z13.1 SCREENING FOR DIABETES MELLITUS: ICD-10-CM

## 2021-10-19 LAB
HBA1C MFR BLD: 5.5 %
HEPATITIS C ANTIBODY: NONREACTIVE

## 2021-10-19 PROCEDURE — 83036 HEMOGLOBIN GLYCOSYLATED A1C: CPT | Performed by: INTERNAL MEDICINE

## 2021-10-19 PROCEDURE — 99214 OFFICE O/P EST MOD 30 MIN: CPT | Performed by: INTERNAL MEDICINE

## 2021-10-19 RX ORDER — LANOLIN ALCOHOL/MO/W.PET/CERES
100 CREAM (GRAM) TOPICAL DAILY
Qty: 30 TABLET | Refills: 5 | Status: SHIPPED | OUTPATIENT
Start: 2021-10-19 | End: 2021-12-28 | Stop reason: SDUPTHER

## 2021-10-19 RX ORDER — SILDENAFIL 25 MG/1
25 TABLET, FILM COATED ORAL PRN
Qty: 10 TABLET | Refills: 0 | Status: SHIPPED | OUTPATIENT
Start: 2021-10-19 | End: 2021-12-28 | Stop reason: SDUPTHER

## 2021-10-19 RX ORDER — GABAPENTIN 300 MG/1
300 CAPSULE ORAL 3 TIMES DAILY
Qty: 90 CAPSULE | Refills: 5 | Status: SHIPPED | OUTPATIENT
Start: 2021-10-19 | End: 2021-12-28 | Stop reason: SDUPTHER

## 2021-10-19 RX ORDER — CLOTRIMAZOLE AND BETAMETHASONE DIPROPIONATE 10; .64 MG/G; MG/G
CREAM TOPICAL
Qty: 45 G | Refills: 1 | Status: SHIPPED | OUTPATIENT
Start: 2021-10-19 | End: 2022-06-14 | Stop reason: SDUPTHER

## 2021-10-19 RX ORDER — FOLIC ACID 1 MG/1
1000 TABLET ORAL DAILY
Qty: 30 TABLET | Refills: 5 | Status: SHIPPED | OUTPATIENT
Start: 2021-10-19 | End: 2021-12-28 | Stop reason: SDUPTHER

## 2021-10-19 RX ORDER — OMEPRAZOLE 20 MG/1
20 CAPSULE, DELAYED RELEASE ORAL DAILY
Qty: 30 CAPSULE | Refills: 5 | Status: SHIPPED | OUTPATIENT
Start: 2021-10-19 | End: 2021-12-28 | Stop reason: SDUPTHER

## 2021-10-19 RX ORDER — CHLORAL HYDRATE 500 MG
500 CAPSULE ORAL DAILY
COMMUNITY

## 2021-10-19 RX ORDER — ASPIRIN 81 MG/1
81 TABLET ORAL DAILY
Qty: 30 TABLET | Refills: 5 | Status: SHIPPED | OUTPATIENT
Start: 2021-10-19 | End: 2022-04-26 | Stop reason: SDUPTHER

## 2021-10-19 ASSESSMENT — ENCOUNTER SYMPTOMS
GASTROINTESTINAL NEGATIVE: 1
RESPIRATORY NEGATIVE: 1
SINUS PAIN: 0
EYES NEGATIVE: 1

## 2021-10-19 NOTE — PATIENT INSTRUCTIONS
What is lung cancer screening? Lung cancer screening is a way in which doctors check the lungs for early signs of cancer in people who have no symptoms of lung cancer. A low-dose CT scan uses much less radiation than a normal CT scan and shows a more detailed image of the lungs than a standard X-ray. The goal of lung cancer screening is to find cancer early, before it has a chance to grow, spread, or cause problems. One large study found that smokers who were screened with low-dose CT scans were less likely to die of lung cancer than those who were screened with standard X-ray. Below is a summary of the things you need to know regarding screening for lung cancer with low-dose computed tomography (LDCT). This is a screening program that involves routine annual screening with LDCT studies of the lung. The LDCTs are done using low-dose radiation that is not thought to increase your cancer risk. If you have other serious medical conditions (other cancers, congestive heart failure) that limit your life expectancy to less than 10 years, you should not undergo lung cancer screening with LDCT. The chance is 20%-60% that the LDCT result will show abnormalities. This would require additional testing which could include repeat imaging or even invasive procedures. Most (about 95%) of \"abnormal\" LDCT results are false in the sense that no lung cancer is ultimately found. Additionally, some (about 10%) of the cancers found would not affect your life expectancy, even if undetected and untreated. If you are still smoking, the single most important thing that you can do to reduce your risk of dying of lung cancer is to quit. For this screening to be covered by Medicare and most other insurers, strict criteria must be met. If you do not meet these criteria, but still wish to undergo LDCT testing, you will be required to sign a waiver indicating your willingness to pay for the scan.       Script for lab given to pt, no fasting required. Pt will get labs done before next appt. Order for CT Lung Screening faxed to [address] [place] [address] they will call pt for appt. Please call [phone] in not heard within 2 weeks. Referral for colonoscopy sent to [address] [place] [address], they will call pt for appt. Copy of referral with address and number given to pt. Patient to return to clinic 6 months (office will call and schedule appt). AVS reviewed and given to pt. It is very important for your care that you keep your appointment. If for some reason you are unable to keep your appointment it is equally important that you call our office at [phone] to cancel your appointment and reschedule. Failure to do so may result in your termination from our practice.   MB

## 2021-10-19 NOTE — PROGRESS NOTES
[address] [facility]   Progress Note      Date of patient's visit: 10/19/2021  Patient's Name:  Camila Navarro Sr. YOB: 1965        PCP:  Clara Moralez MD    Camila Navarro Sr. is a 50 y.o. male who presents for   Chief Complaint   Patient presents with    6 Month Follow-Up    Hypertension    and follow up of chronic medical problems. Patient Active Problem List   Diagnosis    Essential hypertension    Sickle cell trait (HCC)    Primary osteoarthritis of knee    Portal vein thrombosis, june 2017, completed course of warfarin     Gastroesophageal reflux disease without esophagitis    Chronic pain of right ankle       HISTORY OF PRESENT ILLNESS:    History was obtained from the patient. Hypertension:  Home blood pressure monitoring: No.  He is adherent to a low sodium diet. Patient denies chest pain and shortness of breath. Antihypertensive medication side effects: no medication side effects noted. Use of agents associated with hypertension: none. Sodium (mmol/L)   Date Value   10/04/2020 140    BUN (mg/dL)   Date Value   10/04/2020 13    Glucose (mg/dL)   Date Value   10/04/2020 98   05/08/2012 132 (H)      Potassium (mmol/L)   Date Value   10/04/2020 4.6    CREATININE (mg/dL)   Date Value   10/04/2020 0.96         A great of stress at home with ailing sister who has liver cancer. Erectile Dysfunction  Patient complains of erectile dysfunction. Onset of dysfunction was several months ago and was gradual in onset. Patient states the nature of difficulty is both attaining and maintaining erection. Full erections occur spontaneously. Partial erections occur on awakening. Libido is not affected. Risk factors for ED include beta blocker use. Patient denies history of cranial, spinal, or pelvic trauma and hypogonadism. Patient's description of relationship with partner good. Previous treatment of ED includes none.   Continues to smoke heavily   Using PPI daily to help with GERD and he denies any current symptoms. Patient's allergies, medications, past medical, surgical, social and family histories were reviewed and updated as appropriate. ALLERGIES    No Known Allergies      MEDICATIONS:      Current Outpatient Medications   Medication Sig Dispense Refill    metoprolol tartrate (LOPRESSOR) 25 MG tablet Take 0.5 tablets by mouth 2 times daily 30 tablet 0    folic acid (FOLVITE) 1 MG tablet Take 1 tablet by mouth daily 30 tablet 0    thiamine 100 MG tablet Take 1 tablet by mouth daily 30 tablet 0    clotrimazole-betamethasone (LOTRISONE) 1-0.05 % cream apply to affected area twice a day 45 g 1    acetaminophen (TYLENOL) 500 MG tablet Take 1 tablet by mouth 4 times daily as needed for Pain 28 tablet 0    Multiple Vitamins-Minerals (ONE DAILY ADULTS 50+ PO) Take by mouth      ibuprofen (ADVIL;MOTRIN) 800 MG tablet Take 1 tablet by mouth every 8 hours as needed for Pain With food 90 tablet 5    omeprazole (PRILOSEC) 20 MG delayed release capsule Take 1 capsule by mouth daily 30 capsule 5    aspirin (RA ASPIRIN EC ADULT LOW ST) 81 MG EC tablet Take 1 tablet by mouth daily 30 tablet 5    gabapentin (NEURONTIN) 300 MG capsule Take 1 capsule by mouth 3 times daily for 30 days. 90 capsule 5     No current facility-administered medications for this visit.        Patient Care Team:  Libertad Bell MD as PCP - General (Internal Medicine)  Libertad Bell MD as PCP - REHABILITATION [facility] Provider  Liana Osorio MD as Consulting Physician (Vascular Surgery)  Tomas Bond MD as Surgeon (General Surgery: [facility])  Erin Travis MD as Consulting Physician (Infectious Diseases)  Dillon Brown MD as Consulting Physician (Gastroenterology)  Liana Osorio MD as Consulting Physician (Vascular Surgery)    PAST MEDICAL AND SURGICAL HISTORY:      Past Medical History:   Diagnosis Date    ADHD (attention deficit hyperactivity disorder)     Gastroesophageal reflux disease without esophagitis 2/16/2018    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 Mild pan-diverticulosis was noted. A 13 mm Makenzie IIa lesion was noted in transverse colon. Lifting was achieved by submucosal injection of saline. The polyp was resected using hot snare. Two hemoclips were placed to close mucosal defect. A 4 mm polyp was noted in descending colon- removed by cold biopsy    MS COLONOSCOPY W/BIOPSY SINGLE/MULTIPLE N/A 2/20/2018    COLONOSCOPY WITH BIOPSY performed by Ronnie Almanzar [facility] at [facility]      Social History     Tobacco Use    Smoking status: Current Every Day Smoker     Packs/day: 1.00     Years: 31.00     Pack years: 31.00     Types: Cigarettes    Smokeless tobacco: Never Used   Substance Use Topics    Alcohol use: Yes     Types: 1 - 2 Cans of beer per week     Comment: occassional     Nicola Perez  reports that he has been smoking cigarettes. He has a 31.00 pack-year smoking history. He has never used smokeless tobacco.    FAMILY HISTORY:      Family History   Problem Relation Age of Onset    Stroke Mother        REVIEW OF SYSTEMS:    Review of Systems   Constitutional: Negative. HENT: Negative for dental problem and sinus pain. Eyes: Negative. Respiratory: Negative. Cardiovascular: Negative. Gastrointestinal: Negative. Endocrine: Negative. Genitourinary: Negative. Musculoskeletal: Positive for arthralgias. Allergic/Immunologic: Positive for food allergies. Neurological: Negative. Hematological: Negative. Psychiatric/Behavioral: Negative.           PHYSICAL EXAM:      Vitals:    10/19/21 1305   BP: 139/77   Pulse: 74     BP Readings from Last 3 Encounters:   10/19/21 139/77   06/16/21 (!) 159/85 02/25/21 135/70       Physical Examination: General appearance - alert, well appearing, and in no distress  Mental status - alert, oriented to person, place, and time  Chest - clear to auscultation, no wheezes, rales or rhonchi, symmetric air entry  Heart - normal rate and regular rhythm  Back exam - full range of motion, no tenderness, palpable spasm or pain on motion  Neurological - normal muscle tone, no tremors, strength 5/5  Musculoskeletal - no joint tenderness, deformity or swelling    LABORATORY FINDINGS:    CBC:   Lab Results   Component Value Date    WBC 7.8 07/26/2017    HGB 12.6 07/26/2017     07/26/2017     BMP:    Lab Results   Component Value Date     10/04/2020    K 4.6 10/04/2020     10/04/2020    CO2 22 10/04/2020    BUN 13 10/04/2020    CREATININE 0.96 10/04/2020    GLUCOSE 98 10/04/2020    GLUCOSE 132 05/08/2012     Hemoglobin A1C:   Lab Results   Component Value Date    LABA1C 5.8 10/04/2020     Microalbumin Urine: No results found for: MICROALBUR  Lipid profile:   Lab Results   Component Value Date    CHOL 159 07/22/2016    TRIG 47 07/22/2016    HDL 47 10/04/2020     Thyroid functions: No results found for: TSH   Hepatic functions:   Lab Results   Component Value Date    ALT 15 10/04/2020    AST 14 10/04/2020    PROT 7.1 10/04/2020    BILITOT 0.75 10/04/2020    LABALBU 3.8 10/04/2020     Urine Analysis: No results found for: [address] [place]:      Health Maintenance Due   Topic Date Due    Hepatitis C screen  Never done    COVID-19 Vaccine (1) Never done    Shingles Vaccine (1 of 2) Never done    Low dose CT lung screening  08/07/2018    Colon cancer screen colonoscopy  02/20/2021    Flu vaccine (1) Never done    A1C test (Diabetic or Prediabetic)  10/04/2021    Potassium monitoring  10/04/2021    Creatinine monitoring  10/04/2021       ASSESSMENT AND PLAN:       Diagnosis Orders   1.  Essential (primary) hypertension   metoprolol tartrate (LOPRESSOR) 25 MG tablet    Vitamin D 25 Hydroxy   2. Erectile dysfunction, unspecified erectile dysfunction type  POCT glycosylated hemoglobin (Hb A1C)    CBC Auto Differential    Comprehensive Metabolic Panel    TSH with Reflex    Vitamin B12 & Folate    Vitamin D 25 Hydroxy    sildenafil (VIAGRA) 25 MG tablet   3. Gastroesophageal reflux disease without esophagitis  omeprazole (PRILOSEC) 20 MG delayed release capsule    TSH with Reflex   4. Primary osteoarthritis of both knees  gabapentin (NEURONTIN) 300 MG capsule    Vitamin D 25 Hydroxy   5. Chronic pain of right ankle  gabapentin (NEURONTIN) 300 MG capsule   6. Colon cancer screening  [facility] Screening Colonoscopy   7. Sickle cell trait (HCC)  aspirin (RA ASPIRIN EC ADULT LOW ST) 81 MG EC tablet    folic acid (FOLVITE) 1 MG tablet    thiamine 100 MG tablet    CBC Auto Differential    TSH with Reflex    Vitamin B12 & Folate   8. Screening for diabetes mellitus  POCT glycosylated hemoglobin (Hb A1C)   9. Abnormal finding of blood chemistry, unspecified   POCT glycosylated hemoglobin (Hb A1C)    TSH with Reflex    Vitamin D 25 Hydroxy   10. Nutritional anemia, unspecified   CBC Auto Differential    TSH with Reflex    Vitamin B12 & Folate    Vitamin D 25 Hydroxy   11. Jock itch  clotrimazole-betamethasone (LOTRISONE) 1-0.05 % cream   12. Disorder of bone, unspecified   Vitamin D 25 Hydroxy   13. Smoker     14. Personal history of tobacco use     Continue metoprolol with good BP control  Cont PPI  Smoking cessation discussed. Patient due to LDCT  Labs ordered for initial ED workup. F/u after labs completed      FOLLOW UP:   1. Soledad Hagen received counseling on the following healthy behaviors: nutrition and exercise    2. Reviewed prior labs and health maintenance. 3.  Discussed use, benefit, and side effects of prescribed medications. Barriers to medication compliance addressed. All patient questions answered. Pt voiced understanding.      4.  Continue current medications, diet and exercise. Orders Placed This Encounter   Medications    clotrimazole-betamethasone (LOTRISONE) 1-0.05 % cream     Sig: apply to affected area twice a day     Dispense:  45 g     Refill:  1    aspirin (RA ASPIRIN EC ADULT LOW ST) 81 MG EC tablet     Sig: Take 1 tablet by mouth daily     Dispense:  30 tablet     Refill:  5    metoprolol tartrate (LOPRESSOR) 25 MG tablet     Sig: Take 0.5 tablets by mouth 2 times daily     Dispense:  30 tablet     Refill:  0    folic acid (FOLVITE) 1 MG tablet     Sig: Take 1 tablet by mouth daily     Dispense:  30 tablet     Refill:  5    thiamine 100 MG tablet     Sig: Take 1 tablet by mouth daily     Dispense:  30 tablet     Refill:  5    omeprazole (PRILOSEC) 20 MG delayed release capsule     Sig: Take 1 capsule by mouth daily     Dispense:  30 capsule     Refill:  5    gabapentin (NEURONTIN) 300 MG capsule     Sig: Take 1 capsule by mouth 3 times daily for 30 days. Dispense:  90 capsule     Refill:  5    sildenafil (VIAGRA) 25 MG tablet     Sig: Take 1 tablet by mouth as needed for Erectile Dysfunction     Dispense:  10 tablet     Refill:  0          Completed Refills               Requested Prescriptions     Signed Prescriptions Disp Refills    clotrimazole-betamethasone (LOTRISONE) 1-0.05 % cream 45 g 1     Sig: apply to affected area twice a day    aspirin (RA ASPIRIN EC ADULT LOW ST) 81 MG EC tablet 30 tablet 5     Sig: Take 1 tablet by mouth daily    metoprolol tartrate (LOPRESSOR) 25 MG tablet 30 tablet 0     Sig: Take 0.5 tablets by mouth 2 times daily    folic acid (FOLVITE) 1 MG tablet 30 tablet 5     Sig: Take 1 tablet by mouth daily    thiamine 100 MG tablet 30 tablet 5     Sig: Take 1 tablet by mouth daily    omeprazole (PRILOSEC) 20 MG delayed release capsule 30 capsule 5     Sig: Take 1 capsule by mouth daily    gabapentin (NEURONTIN) 300 MG capsule 90 capsule 5     Sig: Take 1 capsule by mouth 3 times daily for 30 days.     sildenafil (VIAGRA) 25 MG tablet 10 tablet 0     Sig: Take 1 tablet by mouth as needed for Erectile Dysfunction       5. Patient given educational materials - see patient instructions    6. Was a self-tracking handout given in paper form or via MyChart? Yes  If yes, see orders or list here. Orders Placed This Encounter   Procedures    CBC Auto Differential     Please get this labwork done before your next visit. Standing Status:   Future     Standing Expiration Date:   10/19/2022    Comprehensive Metabolic Panel     Please get this labwork done before your next visit. Standing Status:   Future     Standing Expiration Date:   10/18/2022    TSH with Reflex     Please get this labwork done before your next visit. Standing Status:   Future     Standing Expiration Date:   10/19/2022    Vitamin B12 & Folate     Please get this labwork done before your next visit. Standing Status:   Future     Standing Expiration Date:   10/19/2022    Vitamin D 25 Hydroxy     Standing Status:   Future     Standing Expiration Date:   10/19/2022   [facility] Screening Colonoscopy     Referral Priority:   Routine     Referral Type: Other     Referral Reason:   Specialty Services Required     Number of Visits Requested:   1    POCT glycosylated hemoglobin (Hb A1C)       Return in about 6 months (around 4/19/2022). Patient voiced understanding and agreed to treatment plan. This note is created with the assistance of a speech-recognition program. While intending to generate a document that actually reflects the content of the visit, the document can still have some mistakes which may not have been identified and corrected by editing.       Dr Genia Mckeon MD, [place]  [facility] , Department of Internal Medicine  Resident Ambulatory Site Medical Director  [address] [facility] Internal Medicine  [address] [facility]  Internal Medicine Clerkship - Bernard Maciel 10/19/2021, 2:01 PM

## 2021-10-19 NOTE — PROGRESS NOTES
Low Dose CT (LDCT) Lung Screening criteria met:     Age 55-77(Medicare) or 50-80 ([facility])   Pack year smoking >30 (Medicare) or >20 ([facility])   Still smoking or less than 15 year since quit   No sign or symptoms of lung cancer   > 11 months since last LDCT     Risks and benefits of lung cancer screening with LDCT scans discussed:    Significance of positive screen - False-positive LDCT results often occur. 95% of all positive results do not lead to a diagnosis of cancer. Usually further imaging can resolve most false-positive results; however, some patients may require invasive procedures. Over diagnosis risk - 10% to 12% of screen-detected lung cancer cases are over diagnosed--that is, the cancer would not have been detected in the patient's lifetime without the screening. Need for follow up screens annually to continue lung cancer screening effectiveness     Risks associated with radiation from annual LDCT- Radiation exposure is about the same as for a mammogram, which is about 1/3 of the annual background radiation exposure from everyday life. Starting screening at age 54 is not likely to increase cancer risk from radiation exposure. Patients with comorbidities resulting in life expectancy of < 10 years, or that would preclude treatment of an abnormality identified on CT, should not be screened due to lack of benefit.     To obtain maximal benefit from this screening, smoking cessation and long-term abstinence from smoking is critical

## 2021-10-27 ENCOUNTER — TELEPHONE (OUTPATIENT)
Dept: SURGERY | Age: 56
End: 2021-10-27

## 2021-11-09 ENCOUNTER — TELEPHONE (OUTPATIENT)
Dept: INTERNAL MEDICINE | Age: 56
End: 2021-11-09

## 2021-11-11 ENCOUNTER — HOSPITAL ENCOUNTER (OUTPATIENT)
Age: 56
Setting detail: SPECIMEN
Discharge: HOME | End: 2021-11-11
Payer: MEDICARE

## 2021-11-11 DIAGNOSIS — M17.0 PRIMARY OSTEOARTHRITIS OF BOTH KNEES: ICD-10-CM

## 2021-11-11 DIAGNOSIS — M89.9 DISORDER OF BONE, UNSPECIFIED: ICD-10-CM

## 2021-11-11 DIAGNOSIS — N52.9 ERECTILE DYSFUNCTION, UNSPECIFIED ERECTILE DYSFUNCTION TYPE: ICD-10-CM

## 2021-11-11 DIAGNOSIS — I10 ESSENTIAL (PRIMARY) HYPERTENSION: ICD-10-CM

## 2021-11-11 DIAGNOSIS — D53.9 NUTRITIONAL ANEMIA, UNSPECIFIED: ICD-10-CM

## 2021-11-11 DIAGNOSIS — D57.3 SICKLE CELL TRAIT (HCC): ICD-10-CM

## 2021-11-11 DIAGNOSIS — R79.9 ABNORMAL FINDING OF BLOOD CHEMISTRY, UNSPECIFIED: ICD-10-CM

## 2021-11-11 DIAGNOSIS — K21.9 GASTROESOPHAGEAL REFLUX DISEASE WITHOUT ESOPHAGITIS: ICD-10-CM

## 2021-11-11 LAB
ABSOLUTE EOS #: 0.36 K/UL (ref 0–0.44)
ABSOLUTE IMMATURE GRANULOCYTE: 0.06 K/UL (ref 0–0.3)
ABSOLUTE LYMPH #: 2.06 K/UL (ref 1.1–3.7)
ABSOLUTE MONO #: 0.61 K/UL (ref 0.1–1.2)
ALBUMIN SERPL-MCNC: 4.3 G/DL (ref 3.5–5.2)
ALBUMIN/GLOBULIN RATIO: 1.2 (ref 1–2.5)
ALP BLD-CCNC: 86 U/L (ref 40–129)
ALT SERPL-CCNC: 15 U/L (ref 5–41)
ANION GAP SERPL CALCULATED.3IONS-SCNC: 15 MMOL/L (ref 9–17)
AST SERPL-CCNC: 16 U/L
BASOPHILS # BLD: 1 % (ref 0–2)
BASOPHILS ABSOLUTE: 0.07 K/UL (ref 0–0.2)
BILIRUB SERPL-MCNC: 0.51 MG/DL (ref 0.3–1.2)
BUN BLDV-MCNC: 11 MG/DL (ref 6–20)
BUN/CREAT BLD: ABNORMAL (ref 9–20)
CALCIUM SERPL-MCNC: 8.8 MG/DL (ref 8.6–10.4)
CHLORIDE BLD-SCNC: 104 MMOL/L (ref 98–107)
CO2: 21 MMOL/L (ref 20–31)
CREAT SERPL-MCNC: 0.89 MG/DL (ref 0.7–1.2)
DIFFERENTIAL TYPE: ABNORMAL
EOSINOPHILS RELATIVE PERCENT: 3 % (ref 1–4)
FOLATE: >20 NG/ML
GFR AFRICAN AMERICAN: >60 ML/MIN
GFR NON-AFRICAN AMERICAN: >60 ML/MIN
GFR SERPL CREATININE-BSD FRML MDRD: ABNORMAL ML/MIN/{1.73_M2}
GFR SERPL CREATININE-BSD FRML MDRD: ABNORMAL ML/MIN/{1.73_M2}
GLUCOSE BLD-MCNC: 114 MG/DL (ref 70–99)
HCT VFR BLD CALC: 51.5 % (ref 40.7–50.3)
HEMOGLOBIN: 17.1 G/DL (ref 13–17)
IMMATURE GRANULOCYTES: 1 %
LYMPHOCYTES # BLD: 17 % (ref 24–43)
MCH RBC QN AUTO: 28.4 PG (ref 25.2–33.5)
MCHC RBC AUTO-ENTMCNC: 33.2 G/DL (ref 28.4–34.8)
MCV RBC AUTO: 85.4 FL (ref 82.6–102.9)
MONOCYTES # BLD: 5 % (ref 3–12)
NRBC AUTOMATED: 0 PER 100 WBC
PDW BLD-RTO: 13.2 % (ref 11.8–14.4)
PLATELET # BLD: 273 K/UL (ref 138–453)
PLATELET ESTIMATE: ABNORMAL
PMV BLD AUTO: 10 FL (ref 8.1–13.5)
POTASSIUM SERPL-SCNC: 4.1 MMOL/L (ref 3.7–5.3)
RBC # BLD: 6.03 M/UL (ref 4.21–5.77)
RBC # BLD: ABNORMAL 10*6/UL
SEG NEUTROPHILS: 73 % (ref 36–65)
SEGMENTED NEUTROPHILS ABSOLUTE COUNT: 8.66 K/UL (ref 1.5–8.1)
SODIUM BLD-SCNC: 140 MMOL/L (ref 135–144)
TOTAL PROTEIN: 7.9 G/DL (ref 6.4–8.3)
TSH SERPL DL<=0.05 MIU/L-ACNC: 1.04 MIU/L (ref 0.3–5)
VITAMIN B-12: 565 PG/ML (ref 232–1245)
VITAMIN D 25-HYDROXY: 16.6 NG/ML (ref 30–100)
WBC # BLD: 11.8 K/UL (ref 3.5–11.3)
WBC # BLD: ABNORMAL 10*3/UL

## 2021-11-22 NOTE — TELEPHONE ENCOUNTER
Writer called pt. and left a VM to call and schedule an appt.  To get the clearance form filled out  Last seen 10/2021

## 2021-11-30 ENCOUNTER — OFFICE VISIT (OUTPATIENT)
Dept: INTERNAL MEDICINE | Age: 56
End: 2021-11-30
Payer: MEDICARE

## 2021-11-30 VITALS
WEIGHT: 209 LBS | HEART RATE: 82 BPM | SYSTOLIC BLOOD PRESSURE: 148 MMHG | TEMPERATURE: 98.2 F | OXYGEN SATURATION: 96 % | DIASTOLIC BLOOD PRESSURE: 87 MMHG | HEIGHT: 72 IN | BODY MASS INDEX: 28.31 KG/M2

## 2021-11-30 DIAGNOSIS — N52.9 ERECTILE DYSFUNCTION, UNSPECIFIED ERECTILE DYSFUNCTION TYPE: ICD-10-CM

## 2021-11-30 DIAGNOSIS — D57.3 SICKLE CELL TRAIT (HCC): ICD-10-CM

## 2021-11-30 DIAGNOSIS — Z01.818 PRE-OPERATIVE CLEARANCE: ICD-10-CM

## 2021-11-30 DIAGNOSIS — I10 HYPERTENSION, UNSPECIFIED TYPE: Primary | ICD-10-CM

## 2021-11-30 PROCEDURE — 99213 OFFICE O/P EST LOW 20 MIN: CPT | Performed by: STUDENT IN AN ORGANIZED HEALTH CARE EDUCATION/TRAINING PROGRAM

## 2021-11-30 PROCEDURE — 99211 OFF/OP EST MAY X REQ PHY/QHP: CPT | Performed by: INTERNAL MEDICINE

## 2021-11-30 RX ORDER — SILDENAFIL 25 MG/1
25 TABLET, FILM COATED ORAL PRN
Qty: 10 TABLET | Refills: 0 | Status: CANCELLED | OUTPATIENT
Start: 2021-11-30

## 2021-11-30 ASSESSMENT — ENCOUNTER SYMPTOMS
DIARRHEA: 0
COUGH: 0
BACK PAIN: 0
VOMITING: 0
CHEST TIGHTNESS: 0
ABDOMINAL PAIN: 0
NAUSEA: 0
BLOOD IN STOOL: 0
WHEEZING: 0
CONSTIPATION: 0
SHORTNESS OF BREATH: 0

## 2021-11-30 NOTE — PROGRESS NOTES
[facility] PHYSICIANS  [facility] [address]  [address] [address]  Dept: [phone]  Dept Fax: [phone]    Office Progress/Follow Up Note  Date ofpatient's visit: 11/30/2021  Patient's Name:  Sarah Ray. YOB: 1965            Patient Care Team:  Alfredo Bright MD as PCP - General (Internal Medicine)  Alfredo Bright MD as PCP - [facility] Provider  Nitin Guevara MD as Consulting Physician (Vascular Surgery)  Sabino Goins MD as Surgeon (General Surgery: [facility])  Maria R Salinas MD as Consulting Physician (Infectious Diseases)  Brady Bethea MD as Consulting Physician (Gastroenterology)  Nitin Guevara MD as Consulting Physician (Vascular Surgery)  ================================================================    REASON FOR VISIT/CHIEF COMPLAINT:  Pre-op Exam (Surgical Clearance, colonoscopy is scheduled for 12/17/21) and Health Maintenance (pt has colonoscopy scheduled, pt wants ct lung screening, declined vaccines)    HISTORY OF PRESENTING ILLNESS:  History was obtained from: patient, electronic medical record. Bon Julian Sr.is a 49 y.o. is here for a follow-up visit and surgical clearance. Medical clearance for colonoscopy: Medical screening colonoscopy scheduled on 12/17/2021. Patient currently taking aspirin and ibuprofen. Patient is asymptomatic denies chest pain, difficulty breathing, headaches, blurring of vision, any physical restriction and states that he can walk several blocks without getting out of breath. Patient states that he drinks 1-2 times a week and smokes a pack of cigarettes every day. Patient has history of hypertension but did not take his medication this morning. Blood pressure during the visit 148/87. Has history of sickle cell trait and is on aspirin. Also history of portal vein thrombosis and finished a course of Coumadin. We will cleared the patient for colonoscopy.     Patient Active Problem List   Diagnosis    Essential hypertension    Sickle cell trait ([facility] [address]    Primary osteoarthritis of knee    Portal vein thrombosis, june 2017, completed course of warfarin     Gastroesophageal reflux disease without esophagitis    Chronic pain of right ankle       Health Maintenance Due   Topic Date Due    COVID-19 Vaccine (1) Never done    Shingles Vaccine (1 of 2) Never done    Low dose CT lung screening  08/07/2018    Colon cancer screen colonoscopy  02/20/2021       No Known Allergies      Current Outpatient Medications   Medication Sig Dispense Refill    clotrimazole-betamethasone (LOTRISONE) 1-0.05 % cream apply to affected area twice a day 45 g 1    aspirin (RA ASPIRIN EC ADULT LOW ST) 81 MG EC tablet Take 1 tablet by mouth daily 30 tablet 5    metoprolol tartrate (LOPRESSOR) 25 MG tablet Take 0.5 tablets by mouth 2 times daily 30 tablet 0    folic acid (FOLVITE) 1 MG tablet Take 1 tablet by mouth daily 30 tablet 5    thiamine 100 MG tablet Take 1 tablet by mouth daily 30 tablet 5    omeprazole (PRILOSEC) 20 MG delayed release capsule Take 1 capsule by mouth daily 30 capsule 5    gabapentin (NEURONTIN) 300 MG capsule Take 1 capsule by mouth 3 times daily for 30 days. 90 capsule 5    Omega-3 Fatty Acids (FISH OIL) 1000 MG CAPS Take 1,000 mg by mouth daily      sildenafil (VIAGRA) 25 MG tablet Take 1 tablet by mouth as needed for Erectile Dysfunction 10 tablet 0    Multiple Vitamins-Minerals (ONE DAILY ADULTS 50+ PO) Take by mouth      ibuprofen (ADVIL;MOTRIN) 800 MG tablet Take 1 tablet by mouth every 8 hours as needed for Pain With food 90 tablet 5     No current facility-administered medications for this visit.        Social History     Tobacco Use    Smoking status: Current Every Day Smoker     Packs/day: 1.00     Years: 36.00     Pack years: 36.00     Types: Cigarettes     Start date: 11/30/1985    Smokeless tobacco: Never Used   Substance Use Topics    Alcohol use: Yes     Types: 1 - 2 Cans of beer per week     Comment: occassional    Drug use: Yes     Frequency: 2.0 times per week     Types: Marijuana Juanetta Warsaw)       Family History   Problem Relation Age of Onset    Stroke Mother         REVIEW OF SYSTEMS:  Review of Systems   Constitutional: Negative for activity change, appetite change and fever. HENT: Negative for congestion. Respiratory: Negative for cough, chest tightness, shortness of breath and wheezing. Cardiovascular: Negative for chest pain, palpitations and leg swelling. Gastrointestinal: Negative for abdominal pain, blood in stool, constipation, diarrhea, nausea and vomiting. Genitourinary: Negative for difficulty urinating. Musculoskeletal: Negative for arthralgias and back pain. Neurological: Negative for dizziness, light-headedness and headaches. Psychiatric/Behavioral: Negative for behavioral problems, confusion and decreased concentration. PHYSICAL EXAM:  Vitals:    11/30/21 1101 11/30/21 1105   BP: (!) 163/87 (!) 148/87   Site: Right Upper Arm Right Upper Arm   Position: Sitting Sitting   Cuff Size: Medium Adult Medium Adult   Pulse: 82 82   Temp: 98.2 °F (36.8 °C)    TempSrc: Infrared    SpO2: 96%    Weight: 209 lb (94.8 kg)    Height: 5' 11.5\" (1.816 m)      BP Readings from Last 3 Encounters:   11/30/21 (!) 148/87   10/19/21 139/77   06/16/21 (!) 159/85        Physical Exam  Constitutional:       Appearance: Normal appearance. HENT:      Head: Normocephalic and atraumatic. Eyes:      Extraocular Movements: Extraocular movements intact. Pupils: Pupils are equal, round, and reactive to light. Cardiovascular:      Rate and Rhythm: Normal rate and regular rhythm. Pulses: Normal pulses. Heart sounds: Normal heart sounds. No murmur heard. Pulmonary:      Effort: Pulmonary effort is normal. No respiratory distress. Breath sounds: Normal breath sounds. No wheezing. Chest:      Chest wall: No tenderness.    Abdominal: materials - see patient instructions        Ariana Walker MD  Internal Medicine Resident, PGY-2  [facility];  [place], [place]  11/30/2021,11:51 AM

## 2021-11-30 NOTE — PROGRESS NOTES
Patient is here for preoperative evaluation. He is supposed to get a colonoscopy done. Patient is on aspirin and ibuprofen daily. He has a history of sickle cell trait for which he is on aspirin. He takes ibuprofen for arthritic pain. He has history of hypertension. Blood pressure is not controlled but he says he has not taken his metoprolol today. We will have him come back to follow-up with his PCP for his blood pressures. Advised to check blood pressure daily. He is advised he can hold his aspirin and ibuprofen for 5 to 7 days prior to colonoscopy. Attending Physician Statement  I have discussed the care of Shoaib Gonsalez, including pertinent history and exam findings,  with the resident. I have reviewed the key elements of all parts of the encounter with the resident. I agree with the assessment, plan and orders as documented by the resident.   (GE Modifier)

## 2021-12-10 ENCOUNTER — TELEPHONE (OUTPATIENT)
Dept: SURGERY | Age: 56
End: 2021-12-10

## 2021-12-13 ENCOUNTER — HOSPITAL ENCOUNTER (OUTPATIENT)
Dept: LAB | Age: 56
Setting detail: SPECIMEN
Discharge: HOME | End: 2021-12-13
Payer: MEDICARE

## 2021-12-13 DIAGNOSIS — Z01.818 PREOP TESTING: Primary | ICD-10-CM

## 2021-12-13 PROCEDURE — U0005 INFEC AGEN DETEC AMPLI PROBE: HCPCS

## 2021-12-13 PROCEDURE — U0003 INFECTIOUS AGENT DETECTION BY NUCLEIC ACID (DNA OR RNA); SEVERE ACUTE RESPIRATORY SYNDROME CORONAVIRUS 2 (SARS-COV-2) (CORONAVIRUS DISEASE [COVID-19]), AMPLIFIED PROBE TECHNIQUE, MAKING USE OF HIGH THROUGHPUT TECHNOLOGIES AS DESCRIBED BY CMS-2020-01-R: HCPCS

## 2021-12-14 ENCOUNTER — TELEPHONE (OUTPATIENT)
Dept: SURGERY | Age: 56
End: 2021-12-14

## 2021-12-14 ENCOUNTER — ANESTHESIA EVENT (OUTPATIENT)
Dept: OPERATING ROOM | Age: 56
End: 2021-12-14
Payer: MEDICARE

## 2021-12-14 LAB
SARS-COV-2: NORMAL
SARS-COV-2: NOT DETECTED
SOURCE: NORMAL

## 2021-12-14 NOTE — TELEPHONE ENCOUNTER
12/14/2021- Spoke to patient. Colonoscopy start time was moved to 12pm with arrival of 10:30am. Patient confirmed.

## 2021-12-17 ENCOUNTER — HOSPITAL ENCOUNTER (OUTPATIENT)
Age: 56
Setting detail: OUTPATIENT SURGERY
Discharge: HOME | End: 2021-12-17
Attending: SURGERY | Admitting: SURGERY
Payer: MEDICARE

## 2021-12-17 ENCOUNTER — ANESTHESIA (OUTPATIENT)
Dept: OPERATING ROOM | Age: 56
End: 2021-12-17
Payer: MEDICARE

## 2021-12-17 VITALS
SYSTOLIC BLOOD PRESSURE: 117 MMHG | OXYGEN SATURATION: 98 % | DIASTOLIC BLOOD PRESSURE: 66 MMHG | TEMPERATURE: 96.8 F | RESPIRATION RATE: 18 BRPM

## 2021-12-17 VITALS
BODY MASS INDEX: 28.21 KG/M2 | DIASTOLIC BLOOD PRESSURE: 74 MMHG | HEART RATE: 64 BPM | SYSTOLIC BLOOD PRESSURE: 143 MMHG | OXYGEN SATURATION: 97 % | WEIGHT: 201.5 LBS | HEIGHT: 71 IN | RESPIRATION RATE: 18 BRPM | TEMPERATURE: 98.2 F

## 2021-12-17 PROCEDURE — 7100000011 HC PHASE II RECOVERY - ADDTL 15 MIN: Performed by: SURGERY

## 2021-12-17 PROCEDURE — 88305 TISSUE EXAM BY PATHOLOGIST: CPT

## 2021-12-17 PROCEDURE — 6360000002 HC RX W HCPCS: Performed by: NURSE ANESTHETIST, CERTIFIED REGISTERED

## 2021-12-17 PROCEDURE — 2580000003 HC RX 258: Performed by: ANESTHESIOLOGY

## 2021-12-17 PROCEDURE — 45385 COLONOSCOPY W/LESION REMOVAL: CPT | Performed by: SURGERY

## 2021-12-17 PROCEDURE — 2709999900 HC NON-CHARGEABLE SUPPLY: Performed by: SURGERY

## 2021-12-17 PROCEDURE — 3700000000 HC ANESTHESIA ATTENDED CARE: Performed by: SURGERY

## 2021-12-17 PROCEDURE — 3609027000 HC COLONOSCOPY: Performed by: SURGERY

## 2021-12-17 PROCEDURE — 7100000000 HC PACU RECOVERY - FIRST 15 MIN: Performed by: SURGERY

## 2021-12-17 PROCEDURE — 2500000003 HC RX 250 WO HCPCS: Performed by: NURSE ANESTHETIST, CERTIFIED REGISTERED

## 2021-12-17 PROCEDURE — 7100000010 HC PHASE II RECOVERY - FIRST 15 MIN: Performed by: SURGERY

## 2021-12-17 PROCEDURE — 3700000001 HC ADD 15 MINUTES (ANESTHESIA): Performed by: SURGERY

## 2021-12-17 PROCEDURE — 45380 COLONOSCOPY AND BIOPSY: CPT | Performed by: SURGERY

## 2021-12-17 RX ORDER — DIPHENHYDRAMINE HYDROCHLORIDE 50 MG/ML
12.5 INJECTION INTRAMUSCULAR; INTRAVENOUS
Status: DISCONTINUED | OUTPATIENT
Start: 2021-12-17 | End: 2021-12-17 | Stop reason: HOSPADM

## 2021-12-17 RX ORDER — OXYCODONE HYDROCHLORIDE AND ACETAMINOPHEN 5; 325 MG/1; MG/1
1 TABLET ORAL PRN
Status: DISCONTINUED | OUTPATIENT
Start: 2021-12-17 | End: 2021-12-17 | Stop reason: HOSPADM

## 2021-12-17 RX ORDER — 0.9 % SODIUM CHLORIDE 0.9 %
500 INTRAVENOUS SOLUTION INTRAVENOUS
Status: DISCONTINUED | OUTPATIENT
Start: 2021-12-17 | End: 2021-12-17 | Stop reason: HOSPADM

## 2021-12-17 RX ORDER — LABETALOL 20 MG/4 ML (5 MG/ML) INTRAVENOUS SYRINGE
10 EVERY 10 MIN PRN
Status: DISCONTINUED | OUTPATIENT
Start: 2021-12-17 | End: 2021-12-17 | Stop reason: HOSPADM

## 2021-12-17 RX ORDER — OXYCODONE HYDROCHLORIDE AND ACETAMINOPHEN 5; 325 MG/1; MG/1
2 TABLET ORAL PRN
Status: DISCONTINUED | OUTPATIENT
Start: 2021-12-17 | End: 2021-12-17 | Stop reason: HOSPADM

## 2021-12-17 RX ORDER — SODIUM CHLORIDE, SODIUM LACTATE, POTASSIUM CHLORIDE, CALCIUM CHLORIDE 600; 310; 30; 20 MG/100ML; MG/100ML; MG/100ML; MG/100ML
INJECTION, SOLUTION INTRAVENOUS CONTINUOUS
Status: DISCONTINUED | OUTPATIENT
Start: 2021-12-17 | End: 2021-12-17 | Stop reason: HOSPADM

## 2021-12-17 RX ORDER — SODIUM CHLORIDE 0.9 % (FLUSH) 0.9 %
10 SYRINGE (ML) INJECTION EVERY 12 HOURS SCHEDULED
Status: DISCONTINUED | OUTPATIENT
Start: 2021-12-17 | End: 2021-12-17 | Stop reason: HOSPADM

## 2021-12-17 RX ORDER — SODIUM CHLORIDE 9 MG/ML
INJECTION, SOLUTION INTRAVENOUS CONTINUOUS
Status: DISCONTINUED | OUTPATIENT
Start: 2021-12-17 | End: 2021-12-17 | Stop reason: HOSPADM

## 2021-12-17 RX ORDER — SODIUM CHLORIDE 0.9 % (FLUSH) 0.9 %
10 SYRINGE (ML) INJECTION PRN
Status: DISCONTINUED | OUTPATIENT
Start: 2021-12-17 | End: 2021-12-17 | Stop reason: HOSPADM

## 2021-12-17 RX ORDER — LIDOCAINE HYDROCHLORIDE 10 MG/ML
INJECTION, SOLUTION INFILTRATION; PERINEURAL PRN
Status: DISCONTINUED | OUTPATIENT
Start: 2021-12-17 | End: 2021-12-17 | Stop reason: SDUPTHER

## 2021-12-17 RX ORDER — HYDRALAZINE HYDROCHLORIDE 20 MG/ML
10 INJECTION INTRAMUSCULAR; INTRAVENOUS EVERY 10 MIN PRN
Status: DISCONTINUED | OUTPATIENT
Start: 2021-12-17 | End: 2021-12-17 | Stop reason: HOSPADM

## 2021-12-17 RX ORDER — ONDANSETRON 2 MG/ML
4 INJECTION INTRAMUSCULAR; INTRAVENOUS
Status: DISCONTINUED | OUTPATIENT
Start: 2021-12-17 | End: 2021-12-17 | Stop reason: HOSPADM

## 2021-12-17 RX ORDER — SODIUM CHLORIDE 9 MG/ML
25 INJECTION, SOLUTION INTRAVENOUS PRN
Status: DISCONTINUED | OUTPATIENT
Start: 2021-12-17 | End: 2021-12-17 | Stop reason: HOSPADM

## 2021-12-17 RX ORDER — MIDAZOLAM HYDROCHLORIDE 2 MG/2ML
1 INJECTION, SOLUTION INTRAMUSCULAR; INTRAVENOUS ONCE
Status: DISCONTINUED | OUTPATIENT
Start: 2021-12-17 | End: 2021-12-17 | Stop reason: HOSPADM

## 2021-12-17 RX ORDER — PROMETHAZINE HYDROCHLORIDE 25 MG/ML
6.25 INJECTION, SOLUTION INTRAMUSCULAR; INTRAVENOUS
Status: DISCONTINUED | OUTPATIENT
Start: 2021-12-17 | End: 2021-12-17 | Stop reason: HOSPADM

## 2021-12-17 RX ORDER — MORPHINE SULFATE 2 MG/ML
2 INJECTION, SOLUTION INTRAMUSCULAR; INTRAVENOUS EVERY 5 MIN PRN
Status: DISCONTINUED | OUTPATIENT
Start: 2021-12-17 | End: 2021-12-17 | Stop reason: HOSPADM

## 2021-12-17 RX ORDER — PROPOFOL 10 MG/ML
INJECTION, EMULSION INTRAVENOUS PRN
Status: DISCONTINUED | OUTPATIENT
Start: 2021-12-17 | End: 2021-12-17 | Stop reason: SDUPTHER

## 2021-12-17 RX ORDER — MEPERIDINE HYDROCHLORIDE 50 MG/ML
12.5 INJECTION INTRAMUSCULAR; INTRAVENOUS; SUBCUTANEOUS EVERY 5 MIN PRN
Status: DISCONTINUED | OUTPATIENT
Start: 2021-12-17 | End: 2021-12-17 | Stop reason: HOSPADM

## 2021-12-17 RX ADMIN — PROPOFOL INJECTABLE EMULSION 50 MG: 10 INJECTION, EMULSION INTRAVENOUS at 14:01

## 2021-12-17 RX ADMIN — PROPOFOL INJECTABLE EMULSION 50 MG: 10 INJECTION, EMULSION INTRAVENOUS at 14:07

## 2021-12-17 RX ADMIN — PROPOFOL INJECTABLE EMULSION 50 MG: 10 INJECTION, EMULSION INTRAVENOUS at 13:56

## 2021-12-17 RX ADMIN — PROPOFOL INJECTABLE EMULSION 50 MG: 10 INJECTION, EMULSION INTRAVENOUS at 13:45

## 2021-12-17 RX ADMIN — SODIUM CHLORIDE, POTASSIUM CHLORIDE, SODIUM LACTATE AND CALCIUM CHLORIDE: 600; 310; 30; 20 INJECTION, SOLUTION INTRAVENOUS at 11:02

## 2021-12-17 RX ADMIN — PROPOFOL INJECTABLE EMULSION 50 MG: 10 INJECTION, EMULSION INTRAVENOUS at 13:47

## 2021-12-17 RX ADMIN — LIDOCAINE HYDROCHLORIDE 50 MG: 10 INJECTION, SOLUTION INFILTRATION; PERINEURAL at 13:39

## 2021-12-17 RX ADMIN — PROPOFOL INJECTABLE EMULSION 50 MG: 10 INJECTION, EMULSION INTRAVENOUS at 13:51

## 2021-12-17 RX ADMIN — PROPOFOL INJECTABLE EMULSION 50 MG: 10 INJECTION, EMULSION INTRAVENOUS at 13:39

## 2021-12-17 RX ADMIN — PROPOFOL INJECTABLE EMULSION 50 MG: 10 INJECTION, EMULSION INTRAVENOUS at 13:43

## 2021-12-17 RX ADMIN — PROPOFOL INJECTABLE EMULSION 50 MG: 10 INJECTION, EMULSION INTRAVENOUS at 13:41

## 2021-12-17 RX ADMIN — SODIUM CHLORIDE, POTASSIUM CHLORIDE, SODIUM LACTATE AND CALCIUM CHLORIDE: 600; 310; 30; 20 INJECTION, SOLUTION INTRAVENOUS at 14:09

## 2021-12-17 ASSESSMENT — PULMONARY FUNCTION TESTS
PIF_VALUE: 0
PIF_VALUE: 1
PIF_VALUE: 0

## 2021-12-17 ASSESSMENT — PAIN - FUNCTIONAL ASSESSMENT: PAIN_FUNCTIONAL_ASSESSMENT: 0-10

## 2021-12-17 ASSESSMENT — PAIN SCALES - GENERAL: PAINLEVEL_OUTOF10: 0

## 2021-12-17 ASSESSMENT — LIFESTYLE VARIABLES: SMOKING_STATUS: 1

## 2021-12-17 NOTE — H&P
[address]      Patient's Name/ Date of Birth/ Gender: Kaylah Amador Sr. / 1965 (49 y.o.) / male     Attending physician: Kristine Santana DO    CC: colorectal cancer screening    History of present Illness: Kaylah Amador Sr. is a 49 y.o. male, presents today for colonoscopy for colorectal cancer screening. Colonoscopy history: last scope 2018 with adenomatous polyp removed      Past Medical History:  has a past medical history of ADHD (attention deficit hyperactivity disorder), Gastroesophageal reflux disease without esophagitis, Hepatic vein thrombosis ([address] Hypertension, and Osteoarthritis. Past Surgical History:   Past Surgical History:   Procedure Laterality Date    ACHILLES TENDON SURGERY  2010    right foot    COLONOSCOPY  02/20/2018    The mucosal exam was normal. Mild pan-diverticulosis was noted. A 13 mm Makenzie IIa lesion was noted in transverse colon. Lifting was achieved by submucosal injection of saline. The polyp was resected using hot snare. Two hemoclips were placed to close mucosal defect. A 4 mm polyp was noted in descending colon- removed by cold biopsy    RI COLONOSCOPY W/BIOPSY SINGLE/MULTIPLE N/A 2/20/2018    COLONOSCOPY WITH BIOPSY performed by Dalia Sheets MD at [address] [place] [address],[address] History:  reports that he has been smoking cigarettes. He started smoking about 36 years ago. He has a 36.00 pack-year smoking history. He has never used smokeless tobacco. He reports current alcohol use. He reports current drug use. Frequency: 2.00 times per week. Drug: Marijuana Shannon Postin). Family History: family history includes Stroke in his mother. Review of Systems:   General: Denies fever, chills, night sweats, weight loss, malaise, fatigue  HEENT: Denies sore throat, sinus problems, allergic rhinosinusitis  Card: Denies chest pain, palpitations, orthopnea/PND.  Denies h/o murmurs  Pulm: Denies cough, shortness of breath, ALARCON  GI:  per HPI; denies history of constipation, diarrhea, hematochezia or melena  : Denies polyuria, dysuria, hematuria  Endo: Denies diabetes, thyroid problems. Heme: Denies anemia, h/o bleeding or clotting problems. Neuro: Denies h/o CVA, TIA  Skin: Denies rashes, ulcers  Musculoskeletal: Denies muscle, joint, back pain. Allergies: Patient has no known allergies. Current Meds:  Current Facility-Administered Medications:     0.9 % sodium chloride infusion, , IntraVENous, Continuous, Mauricio Vines MD    lactated ringers infusion, , IntraVENous, Continuous, Mauricio Vines MD    sodium chloride flush 0.9 % injection 10 mL, 10 mL, IntraVENous, 2 times per day, Mauricio Vines MD    sodium chloride flush 0.9 % injection 10 mL, 10 mL, IntraVENous, PRN, Mauricio Vines MD    0.9 % sodium chloride infusion, 25 mL, IntraVENous, PRN, Mauricio Vines MD    Physical Exam:  Vital signs and Nurse's note reviewed  Gen:  A&Ox3, NAD  HEENT: NCAT, PERRLA, EOMI, no scleral icterus, oral mucosa moist  Neck: Trachea midline without obvious masses or lesions  Chest: Symmetric rise with inhalation, no evidence of trauma  CVS: S1S2  Resp: Good bilateral air entry, no audible wheeze or rhonchi  Abd: soft, non-tender, non-distended, no hepatosplenomegaly or palpable masses  Ext: No clubbing, cyanosis, edema, peripheral pulses 2+ Rad/Fem/DP/PT  CNS: Moves all extremities, no gross focal motor deficits  Skin: No erythema or ulcerations         Assessment:    Randall Thea Sr. is a 49 y.o. male presents for colonoscopy for colorectal screening     Plan:    1. To OR for colonoscopy. The risks include bleeding, infection, scarring, perforation, damage to surrounding tissues, need for further surgery in the future. The benefits, alternatives, complications and procedure details were explained to the patient, all questions were answered.           Helen Thomas DO  12/17/2021

## 2021-12-17 NOTE — ANESTHESIA POSTPROCEDURE EVALUATION
Department of Anesthesiology  Postprocedure Note    Patient: Jessie Whitt Sr. MRN: [id number]  YOB: 1965  Date of evaluation: 12/17/2021  Time:  5:01 PM     Procedure Summary     Date: 12/17/21 Room / Location: [address] [place] [address] 03 / [address] [facility]    Anesthesia Start: 3356 Anesthesia Stop: 5117    Procedure: COLORECTAL CANCER SCREENING, NOT HIGH RISK (N/A ) Diagnosis: (Z12.11 SCREENING)    Surgeons: Mil Bartlett DO Responsible Provider: Jeo Shi MD    Anesthesia Type: MAC ASA Status: 2          Anesthesia Type: MAC    Cale Phase I: Cale Score: 5    Cale Phase II: Cale Score: 9    Last vitals: Reviewed and per EMR flowsheets.        Anesthesia Post Evaluation    Patient location during evaluation: PACU  Patient participation: complete - patient participated  Level of consciousness: awake and alert  Airway patency: patent  Nausea & Vomiting: no nausea and no vomiting  Complications: no  Cardiovascular status: hemodynamically stable  Respiratory status: nasal cannula and spontaneous ventilation  Hydration status: euvolemic  Multimodal analgesia pain management approach

## 2021-12-17 NOTE — OP NOTE
Operative Note      Patient: Angi Peraza Sr. YOB: 1965  MRN: [id number]    Date of Procedure: 12/17/2021    Pre-Op Diagnosis: history of adenomatous polyp 2018    Post-Op Diagnosis:   L colon diverticulosis  Transverse colon polyp 6mm  Numerous sigmoid colon polyps ranging from 6mm to 1cm  Internal hemorrhoids       Procedure(s):  COLORECTAL CANCER SCREENING, NOT HIGH RISK   Polypectomy hot snare and cold forcep    Surgeon(s):  Nicolás Goode DO    Assistant:   * No surgical staff found *    Anesthesia: Monitor Anesthesia Care    Estimated Blood Loss (mL): Minimal    Complications: None    Specimens:   ID Type Source Tests Collected by Time Destination   A : transverse colon polyp Tissue Colon-Transverse SURGICAL PATHOLOGY Nicolás Goode,  12/17/2021 1358    B : sigmoid colon polyp Tissue Sigmoid Colon [facility]OLOGY [facility]  12/17/2021 1400        Implants:  * No implants in log *      Drains: * No LDAs found *    Findings: as above    Detailed Description of Procedure:       INDICATIONS FOR PROCEDURE:   The patient is a 49y.o. year old male with history of above preop diagnosis. Colonoscopy with possible biopsy or polypectomy was recommend, the risk, benefits, expected outcome, and alternatives to the procedure were explained. Risks included but are not limited to bleeding, infection, respiratory distress, hypotension, and perforation of the colon. The patient understands and is in agreement. PROCEDURE DETAILS:  Patient was brought to the endoscopy suite and placed in the left lateral recumbent position. A time out was completed verifying correct patient, procedure and equipment. Anesthesia was induced using MAC guidelines. A digital rectal exam was performed. The colonoscope was inserted into the rectum without difficulty and the scope was advanced to the cecum which was identified by anatomy and by palpation. Bowel prep was adequate.  The scope was slowly withdrawn visualizing the cecum, ascending colon, transverse colon, descending colon, sigmoid colon and rectum. The scope was withdrawn to the rectal vault and then retroflexed. The scope was then straightened and removed. The patient tolerated the procedure well and was transferred to the recovery unit in stable condition. Findings:    Polyps:    Transverse colon polyp x1 6mm removed with cold forcep   Sigmoid colon polyps at the rectosigmoid junction, #7 removed with hot snare ranging from 6mm to 1cm       Other findings:    Internal hemorrhoids without complication   Left colon diverticulosis without complication      Greater than 6 minutes was spent withdrawing the colonoscope. IMPRESSION/PLAN:   1. Increase dietary fiber and water  2. Referral to GI for repeat colonoscopy and polypectomy and other indicated procedures.           Electronically signed by Anai Scruggs DO on 12/17/2021 at 2:16 PM

## 2021-12-17 NOTE — ANESTHESIA PRE PROCEDURE
Department of Anesthesiology  Preprocedure Note       Name:  Delfin Frias Sr.   Age:  49 y.o.  :  1965                                          MRN:  [id number]         Date:  2021      Surgeon: Jason Harden):  Taylor Barth DO    Procedure: Procedure(s):  COLORECTAL CANCER SCREENING, NOT HIGH RISK    Medications prior to admission:   Prior to Admission medications    Medication Sig Start Date End Date Taking? Authorizing Provider   clotrimazole-betamethasone (LOTRISONE) 1-0.05 % cream apply to affected area twice a day 10/19/21  Yes Monty Trujillo MD   metoprolol tartrate (LOPRESSOR) 25 MG tablet Take 0.5 tablets by mouth 2 times daily 10/19/21  Yes Monty Trujillo MD   thiamine 100 MG tablet Take 1 tablet by mouth daily 10/19/21  Yes Monty Trujillo MD   Omega-3 Fatty Acids (FISH OIL) 1000 MG CAPS Take 1,000 mg by mouth daily   Yes Historical Provider, MD   Multiple Vitamins-Minerals (ONE DAILY ADULTS 50+ PO) Take by mouth   Yes Historical Provider, MD   ibuprofen (ADVIL;MOTRIN) 800 MG tablet Take 1 tablet by mouth every 8 hours as needed for Pain With food 20  Yes Monty Trujillo MD   aspirin (RA ASPIRIN EC ADULT LOW ST) 81 MG EC tablet Take 1 tablet by mouth daily 10/19/21   Monty Trujillo MD   folic acid (FOLVITE) 1 MG tablet Take 1 tablet by mouth daily 10/19/21   Monty Trujillo MD   omeprazole (PRILOSEC) 20 MG delayed release capsule Take 1 capsule by mouth daily 10/19/21   Monty Trujillo MD   gabapentin (NEURONTIN) 300 MG capsule Take 1 capsule by mouth 3 times daily for 30 days.  10/19/21 11/30/21  Monty Trujillo MD   sildenafil (VIAGRA) 25 MG tablet Take 1 tablet by mouth as needed for Erectile Dysfunction 10/19/21   Monty Trujillo MD       Current medications:    Current Facility-Administered Medications   Medication Dose Route Frequency Provider Last Rate Last Admin    0.9 % sodium chloride infusion   IntraVENous Continuous Matt Parada MD        lactated ringers infusion   IntraVENous Continuous Matt Parada  mL/hr at 12/17/21 1102 New Bag at 12/17/21 1102    sodium chloride flush 0.9 % injection 10 mL  10 mL IntraVENous 2 times per day Alexx Figueroa MD        sodium chloride flush 0.9 % injection 10 mL  10 mL IntraVENous PRN Alexx Figueroa MD        0.9 % sodium chloride infusion  25 mL IntraVENous PRN Alexx Figueroa MD           Allergies:  No Known Allergies    Problem List:    Patient Active Problem List   Diagnosis Code    Essential hypertension I10    Sickle cell trait ([facility] [address] D57.3    Primary osteoarthritis of knee M17.10    Portal vein thrombosis, june 2017, completed course of warfarin  I81    Gastroesophageal reflux disease without esophagitis K21.9    Chronic pain of right ankle M25.571, G89.29       Past Medical History:        Diagnosis Date    ADHD (attention deficit hyperactivity disorder)     Gastroesophageal reflux disease without esophagitis 2/16/2018    Hepatic vein thrombosis ([facility] [address] 6/28/2017    Hypertension     Osteoarthritis        Past Surgical History:        Procedure Laterality Date    ACHILLES TENDON SURGERY  2010    right foot    COLONOSCOPY  02/20/2018    The mucosal exam was normal. Mild pan-diverticulosis was noted. A 13 mm Makenzie IIa lesion was noted in transverse colon. Lifting was achieved by submucosal injection of saline. The polyp was resected using hot snare. Two hemoclips were placed to close mucosal defect.  A 4 mm polyp was noted in descending colon- removed by cold biopsy    [place] COLONOSCOPY W/BIOPSY SINGLE/MULTIPLE N/A 2/20/2018    COLONOSCOPY WITH BIOPSY performed by Eze Felton MD at [address] [place] [address],[address] History:    Social History     Tobacco Use    Smoking status: Current Every Day Smoker     Packs/day: 1.00     Years: 36.00     Pack years: 36.00     Types: Cigarettes     Start date: 11/30/1985    Smokeless tobacco: Never Used   Substance Use Topics    Alcohol use: Yes     Types: 1 - 2 Cans of beer per week     Comment: occassional Ready to quit: Not Answered  Counseling given: Not Answered      Vital Signs (Current):   Vitals:    12/17/21 1044 12/17/21 1048   BP:  127/85   Pulse:  72   Resp:  12   Temp:  97.6 °F (36.4 °C)   TempSrc:  Infrared   SpO2:  98%   Weight: 201 lb 8 oz (91.4 kg)    Height: 5' 11\" (1.803 m)                                               BP Readings from Last 3 Encounters:   12/17/21 127/85   11/30/21 (!) 148/87   10/19/21 139/77       NPO Status: Time of last liquid consumption: 0800 (sips of water with am meds)                        Time of last solid consumption: 0000                        Date of last liquid consumption: 12/17/21                        Date of last solid food consumption: 12/16/21    BMI:   Wt Readings from Last 3 Encounters:   12/17/21 201 lb 8 oz (91.4 kg)   11/30/21 209 lb (94.8 kg)   10/19/21 198 lb 3.2 oz (89.9 kg)     Body mass index is 28.1 kg/m². CBC:   Lab Results   Component Value Date    WBC 11.8 11/11/2021    RBC 6.03 11/11/2021    HGB 17.1 11/11/2021    HCT 51.5 11/11/2021    MCV 85.4 11/11/2021    RDW 13.2 11/11/2021     11/11/2021       CMP:   Lab Results   Component Value Date     11/11/2021    K 4.1 11/11/2021     11/11/2021    CO2 21 11/11/2021    BUN 11 11/11/2021    CREATININE 0.89 11/11/2021    GFRAA >60 11/11/2021    LABGLOM >60 11/11/2021    GLUCOSE 114 11/11/2021    GLUCOSE 132 05/08/2012    PROT 7.9 11/11/2021    CALCIUM 8.8 11/11/2021    BILITOT 0.51 11/11/2021    ALKPHOS 86 11/11/2021    AST 16 11/11/2021    ALT 15 11/11/2021       POC Tests: No results for input(s): POCGLU, POCNA, POCK, POCCL, POCBUN, POCHEMO, POCHCT in the last 72 hours.     Coags:   Lab Results   Component Value Date    PROTIME 27.1 10/13/2017    INR 2.49 10/13/2017       HCG (If Applicable): No results found for: PREGTESTUR, PREGSERUM, HCG, HCGQUANT     ABGs: No results found for: PHART, PO2ART, NTY6LDV, WNW1SGN, BEART, O3NVYOVH     Type & Screen (If Applicable):  No results found for: Orpha Memory    Drug/Infectious Status (If Applicable):  Lab Results   Component Value Date    HEPCAB NONREACTIVE 10/19/2021       COVID-19 Screening (If Applicable):   Lab Results   Component Value Date    COVID19 Not Detected 12/13/2021           Anesthesia Evaluation  Patient summary reviewed and Nursing notes reviewed  Airway: Mallampati: III  TM distance: >3 FB   Neck ROM: full  Mouth opening: > = 3 FB Dental:      Comment: -MISSING SOME UPPER AND LOWER TEETH    Pulmonary:normal exam    (+) COPD:  current smoker                          ROS comment: -SMOKES 1 PPD FOR 37 YEARS  -PRODUCTIVE COUGH   Cardiovascular:    (+) hypertension:,                   Neuro/Psych:   Negative Neuro/Psych ROS              GI/Hepatic/Renal:   (+) GERD: well controlled,           Endo/Other:    (+) : arthritis:., .                  ROS comment: -NPO AFTER MIDNIGHT  -NKDA Abdominal:             Vascular: negative vascular ROS. Other Findings:             Anesthesia Plan      MAC     ASA 2       Induction: intravenous. MIPS: Postoperative opioids intended and Prophylactic antiemetics administered. Anesthetic plan and risks discussed with patient. Plan discussed with CRNA.     Attending anesthesiologist reviewed and agrees with Deanna Duron MD   12/17/2021

## 2021-12-21 LAB — SURGICAL PATHOLOGY REPORT: NORMAL

## 2021-12-28 ENCOUNTER — OFFICE VISIT (OUTPATIENT)
Dept: INTERNAL MEDICINE | Age: 56
End: 2021-12-28
Payer: MEDICARE

## 2021-12-28 VITALS
HEIGHT: 72 IN | HEART RATE: 66 BPM | SYSTOLIC BLOOD PRESSURE: 144 MMHG | BODY MASS INDEX: 27.44 KG/M2 | WEIGHT: 202.6 LBS | DIASTOLIC BLOOD PRESSURE: 79 MMHG

## 2021-12-28 DIAGNOSIS — N52.9 ERECTILE DYSFUNCTION, UNSPECIFIED ERECTILE DYSFUNCTION TYPE: ICD-10-CM

## 2021-12-28 DIAGNOSIS — D12.6 TUBULAR ADENOMA OF COLON: Primary | ICD-10-CM

## 2021-12-28 DIAGNOSIS — D57.3 SICKLE CELL TRAIT (HCC): ICD-10-CM

## 2021-12-28 DIAGNOSIS — I10 ESSENTIAL (PRIMARY) HYPERTENSION: ICD-10-CM

## 2021-12-28 DIAGNOSIS — K21.9 GASTROESOPHAGEAL REFLUX DISEASE WITHOUT ESOPHAGITIS: ICD-10-CM

## 2021-12-28 DIAGNOSIS — M25.571 CHRONIC PAIN OF RIGHT ANKLE: ICD-10-CM

## 2021-12-28 DIAGNOSIS — G89.29 CHRONIC PAIN OF RIGHT ANKLE: ICD-10-CM

## 2021-12-28 DIAGNOSIS — M17.0 PRIMARY OSTEOARTHRITIS OF BOTH KNEES: ICD-10-CM

## 2021-12-28 PROCEDURE — G8484 FLU IMMUNIZE NO ADMIN: HCPCS | Performed by: INTERNAL MEDICINE

## 2021-12-28 PROCEDURE — G8419 CALC BMI OUT NRM PARAM NOF/U: HCPCS | Performed by: INTERNAL MEDICINE

## 2021-12-28 PROCEDURE — 4004F PT TOBACCO SCREEN RCVD TLK: CPT | Performed by: INTERNAL MEDICINE

## 2021-12-28 PROCEDURE — G8427 DOCREV CUR MEDS BY ELIG CLIN: HCPCS | Performed by: INTERNAL MEDICINE

## 2021-12-28 PROCEDURE — 3017F COLORECTAL CA SCREEN DOC REV: CPT | Performed by: INTERNAL MEDICINE

## 2021-12-28 PROCEDURE — 99214 OFFICE O/P EST MOD 30 MIN: CPT | Performed by: INTERNAL MEDICINE

## 2021-12-28 RX ORDER — FOLIC ACID 1 MG/1
1000 TABLET ORAL DAILY
Qty: 30 TABLET | Refills: 5 | Status: SHIPPED | OUTPATIENT
Start: 2021-12-28 | End: 2022-04-26 | Stop reason: SDUPTHER

## 2021-12-28 RX ORDER — OMEPRAZOLE 20 MG/1
20 CAPSULE, DELAYED RELEASE ORAL DAILY
Qty: 30 CAPSULE | Refills: 5 | Status: SHIPPED | OUTPATIENT
Start: 2021-12-28 | End: 2022-04-26 | Stop reason: SDUPTHER

## 2021-12-28 RX ORDER — LANOLIN ALCOHOL/MO/W.PET/CERES
100 CREAM (GRAM) TOPICAL DAILY
Qty: 30 TABLET | Refills: 5 | Status: SHIPPED | OUTPATIENT
Start: 2021-12-28 | End: 2022-04-26 | Stop reason: SDUPTHER

## 2021-12-28 RX ORDER — GABAPENTIN 300 MG/1
300 CAPSULE ORAL 3 TIMES DAILY
Qty: 90 CAPSULE | Refills: 5 | Status: SHIPPED | OUTPATIENT
Start: 2021-12-28 | End: 2022-04-26 | Stop reason: SDUPTHER

## 2021-12-28 RX ORDER — SILDENAFIL 25 MG/1
25 TABLET, FILM COATED ORAL PRN
Qty: 10 TABLET | Refills: 0 | Status: SHIPPED | OUTPATIENT
Start: 2021-12-28 | End: 2022-04-26 | Stop reason: SDUPTHER

## 2021-12-28 ASSESSMENT — ENCOUNTER SYMPTOMS
RESPIRATORY NEGATIVE: 1
GASTROINTESTINAL NEGATIVE: 1
EYES NEGATIVE: 1

## 2021-12-28 NOTE — PATIENT INSTRUCTIONS
Medications e-scribe to pharmacy of pt's choice. Patient to return to clinic 3 months (3/29/22). AVS reviewed and given to pt. It is very important for your care that you keep your appointment. If for some reason you are unable to keep your appointment it is equally important that you call our office at [phone] to cancel your appointment and reschedule. Failure to do so may result in your termination from our practice.   MB

## 2021-12-28 NOTE — PROGRESS NOTES
[facility]   Progress Note         Date of patient's visit: 12/28/2021  Patient's Name:  Pedro Tidwell Sr. YOB: 1965        PCP:  Tarsha Brandt MD    Pedro Tidwell Sr. is a 49 y.o. male who presents for   Chief Complaint   Patient presents with    Hypertension     1 month follow up    and follow up of chronic medical problems. Patient Active Problem List   Diagnosis    Essential hypertension    Sickle cell trait (HCC)    Primary osteoarthritis of knee    Portal vein thrombosis, june 2017, completed course of warfarin     Gastroesophageal reflux disease without esophagitis    Chronic pain of right ankle       HISTORY OF PRESENT ILLNESS:    History was obtained from the patient. Hypertension  Patient is here for follow-up of elevated blood pressure. He is not exercising and is adherent to a low-salt diet. Blood pressure is not well controlled at home. Cardiac symptoms: none. Patient denies chest pain and dyspnea. Cardiovascular risk factors: hypertension, male gender and smoking/ tobacco exposure. Use of agents associated with hypertension: NSAIDS. History of target organ damage: none. recent colonoscopy showed precancerous polyps and will have GI repeat to remove remaining polyps - est care with Dr Kurtis Anderson in a few weeks. Continues to smoke  Still grieving due to loss of sister recently. Patient's allergies, medications, past medical, surgical, social and family histories were reviewed and updated as appropriate.     ALLERGIES    No Known Allergies      MEDICATIONS:      Current Outpatient Medications   Medication Sig Dispense Refill    clotrimazole-betamethasone (LOTRISONE) 1-0.05 % cream apply to affected area twice a day 45 g 1    aspirin (RA ASPIRIN EC ADULT LOW ST) 81 MG EC tablet Take 1 tablet by mouth daily 30 tablet 5    metoprolol tartrate (LOPRESSOR) 25 MG tablet Take 0.5 tablets by mouth 2 times daily 30 tablet 0    folic acid (FOLVITE) 1 MG tablet Take 1 tablet by mouth daily 30 tablet 5    thiamine 100 MG tablet Take 1 tablet by mouth daily 30 tablet 5    omeprazole (PRILOSEC) 20 MG delayed release capsule Take 1 capsule by mouth daily 30 capsule 5    gabapentin (NEURONTIN) 300 MG capsule Take 1 capsule by mouth 3 times daily for 30 days. 90 capsule 5    Omega-3 Fatty Acids (FISH OIL) 1000 MG CAPS Take 1,000 mg by mouth daily      sildenafil (VIAGRA) 25 MG tablet Take 1 tablet by mouth as needed for Erectile Dysfunction 10 tablet 0    Multiple Vitamins-Minerals (ONE DAILY ADULTS 50+ PO) Take by mouth      ibuprofen (ADVIL;MOTRIN) 800 MG tablet Take 1 tablet by mouth every 8 hours as needed for Pain With food 90 tablet 5     No current facility-administered medications for this visit. Patient Care Team:  Barbie Carlos MD as PCP - General (Internal Medicine)  Barbie Carlos MD as PCP - [facility]  Billy Alberto MD as Consulting Physician (Vascular Surgery)  Brett Mead MD as Surgeon (General Surgery: Cedric Gomez)  Althea Bennett MD as Consulting Physician (Infectious Diseases)  Rosas Redmond MD as Consulting Physician (Gastroenterology)  Billy Alberto MD as Consulting Physician (Vascular Surgery)    PAST MEDICAL AND SURGICAL HISTORY:      Past Medical History:   Diagnosis Date    ADHD (attention deficit hyperactivity disorder)     Gastroesophageal reflux disease without esophagitis 2/16/2018    Hepatic vein thrombosis ([facility] [address] 6/28/2017    Hypertension     Osteoarthritis      Past Surgical History:   Procedure Laterality Date    ACHILLES TENDON SURGERY  2010    right foot    COLONOSCOPY  02/20/2018    The mucosal exam was normal. Mild pan-diverticulosis was noted. A 13 mm Makenzie IIa lesion was noted in transverse colon. Lifting was achieved by submucosal injection of saline. The polyp was resected using hot snare. Two hemoclips were placed to close mucosal defect.  A 4 mm polyp was noted in descending colon- removed by cold biopsy    COLONOSCOPY  12/17/2021    COLORECTAL CANCER SCREENING, NOT HIGH RISK     COLONOSCOPY N/A 12/17/2021    COLORECTAL CANCER SCREENING, NOT HIGH RISK performed by Helen Thomas DO at [address] W/BIOPSY SINGLE/MULTIPLE N/A 2/20/2018    COLONOSCOPY WITH BIOPSY performed by Kim Polk MD at [facility]      Social History     Tobacco Use    Smoking status: Current Every Day Smoker     Packs/day: 1.00     Years: 36.00     Pack years: 36.00     Types: Cigarettes     Start date: 11/30/1985    Smokeless tobacco: Never Used   Substance Use Topics    Alcohol use: Yes     Types: 1 - 2 Cans of beer per week     Comment: occassional     Li Noguera  reports that he has been smoking cigarettes. He started smoking about 36 years ago. He has a 36.00 pack-year smoking history. He has never used smokeless tobacco.    FAMILY HISTORY:      Family History   Problem Relation Age of Onset    Stroke Mother        REVIEW OF SYSTEMS:    Review of Systems   Constitutional: Negative. HENT: Positive for dental problem. Eyes: Negative. Respiratory: Negative. Cardiovascular: Negative. Gastrointestinal: Negative. Endocrine: Negative. Genitourinary: Negative. Musculoskeletal: Positive for arthralgias. Skin: Negative. Allergic/Immunologic: Positive for environmental allergies. Neurological: Negative. Hematological: Negative. Psychiatric/Behavioral: Negative.         PHYSICAL EXAM:      Vitals:    12/28/21 1343   BP: (!) 144/79   Pulse: 66     BP Readings from Last 3 Encounters:   12/28/21 (!) 144/79   12/17/21 117/66   12/17/21 (!) 143/74       Physical Examination: General appearance - alert, well appearing, and in no distress  Mental status - alert, oriented to person, place, and time  Chest - clear to auscultation, no wheezes, rales or rhonchi, symmetric air entry  Heart - normal rate and regular rhythm  Back exam - full range of motion, no tenderness, palpable spasm or pain on motion  Neurological - alert, oriented, normal speech, no focal findings or movement disorder noted  Musculoskeletal - no joint tenderness, deformity or swelling    LABORATORY FINDINGS:    CBC:   Lab Results   Component Value Date    WBC 11.8 11/11/2021    HGB 17.1 11/11/2021     11/11/2021     BMP:    Lab Results   Component Value Date     11/11/2021    K 4.1 11/11/2021     11/11/2021    CO2 21 11/11/2021    BUN 11 11/11/2021    CREATININE 0.89 11/11/2021    GLUCOSE 114 11/11/2021    GLUCOSE 132 05/08/2012     Hemoglobin A1C:   Lab Results   Component Value Date    LABA1C 5.5 10/19/2021     Microalbumin Urine: No results found for: Valeri Mcadams  Lipid profile:   Lab Results   Component Value Date    CHOL 159 07/22/2016    TRIG 47 07/22/2016    HDL 47 10/04/2020     Thyroid functions:   Lab Results   Component Value Date    TSH 1.04 11/11/2021      Hepatic functions:   Lab Results   Component Value Date    ALT 15 11/11/2021    AST 16 11/11/2021    PROT 7.9 11/11/2021    BILITOT 0.51 11/11/2021    LABALBU 4.3 11/11/2021     Urine Analysis: No results found for: [id number] Elan Klein:      Health Maintenance Due   Topic Date Due    COVID-19 Vaccine (1) Never done    Shingles Vaccine (1 of 2) Never done    Low dose CT lung screening  08/07/2018       ASSESSMENT AND PLAN:       Diagnosis Orders   1. Essential (primary) hypertension  - not controlled due to patient forgeting second dose at bedtime many times. Asked patient to take meds at breakfast and dinner. metoprolol tartrate (LOPRESSOR) 25 MG tablet   2. Sickle cell trait (HCC)  folic acid (FOLVITE) 1 MG tablet    thiamine 100 MG tablet   3. Gastroesophageal reflux disease without esophagitis  omeprazole (PRILOSEC) 20 MG delayed release capsule   4. Primary osteoarthritis of both knees  gabapentin (NEURONTIN) 300 MG capsule   5.  Chronic pain of right ankle  gabapentin (NEURONTIN) 300 MG capsule   6. Erectile dysfunction, unspecified erectile dysfunction type  sildenafil (VIAGRA) 25 MG tablet     7. smoking Patient was counseled on tobacco cessation. Based upon patient's motivation to change his behavior, the following plan was agreed upon: patient will think about his/her triggers for using tobacco.  He was provided with a list of local tobacco cessation resources. Provider spent 5 minutes counseling patient. 8. Tubular adenoma of colon  F/u with GI in 3 weeks       rtc in 3 months     FOLLOW UP:   1. Patrica Newell received counseling on the following healthy behaviors: nutrition and exercise    2. Reviewed prior labs and health maintenance. 3.  Discussed use, benefit, and side effects of prescribed medications. Barriers to medication compliance addressed. All patient questions answered. Pt voiced understanding. 4.  Continue current medications, diet and exercise. Orders Placed This Encounter   Medications    metoprolol tartrate (LOPRESSOR) 25 MG tablet     Sig: Take 0.5 tablets by mouth 2 times daily     Dispense:  30 tablet     Refill:  0    folic acid (FOLVITE) 1 MG tablet     Sig: Take 1 tablet by mouth daily     Dispense:  30 tablet     Refill:  5    omeprazole (PRILOSEC) 20 MG delayed release capsule     Sig: Take 1 capsule by mouth daily     Dispense:  30 capsule     Refill:  5    gabapentin (NEURONTIN) 300 MG capsule     Sig: Take 1 capsule by mouth 3 times daily for 30 days.      Dispense:  90 capsule     Refill:  5    sildenafil (VIAGRA) 25 MG tablet     Sig: Take 1 tablet by mouth as needed for Erectile Dysfunction     Dispense:  10 tablet     Refill:  0    thiamine 100 MG tablet     Sig: Take 1 tablet by mouth daily     Dispense:  30 tablet     Refill:  5          Completed Refills               Requested Prescriptions     Signed Prescriptions Disp Refills    metoprolol tartrate (LOPRESSOR) 25 MG tablet 30 tablet 0     Sig: Take 0.5 tablets by mouth 2 times daily    folic acid (FOLVITE) 1 MG tablet 30 tablet 5     Sig: Take 1 tablet by mouth daily    omeprazole (PRILOSEC) 20 MG delayed release capsule 30 capsule 5     Sig: Take 1 capsule by mouth daily    gabapentin (NEURONTIN) 300 MG capsule 90 capsule 5     Sig: Take 1 capsule by mouth 3 times daily for 30 days.  sildenafil (VIAGRA) 25 MG tablet 10 tablet 0     Sig: Take 1 tablet by mouth as needed for Erectile Dysfunction    thiamine 100 MG tablet 30 tablet 5     Sig: Take 1 tablet by mouth daily       5. Patient given educational materials - see patient instructions    6. Was a self-tracking handout given in paper form or via [employer]? Yes  If yes, see orders or list here. No orders of the defined types were placed in this encounter. Return in about 3 months (around 3/28/2022) for High Blood Pressure. Patient voiced understanding and agreed to treatment plan. This note is created with the assistance of a speech-recognition program. While intending to generate a document that actually reflects the content of the visit, the document can still have some mistakes which may not have been identified and corrected by editing.       Dr Tracie Hutson MD, [address]  Associate , Department of Internal Medicine  Resident Ambulatory Site Medical Director  [address] [facility] Internal Medicine  [facility]  Internal Medicine Clerkship - Louise Tsang                   12/28/2021, 2:13 PM

## 2021-12-30 ENCOUNTER — TELEPHONE (OUTPATIENT)
Dept: GASTROENTEROLOGY | Age: 56
End: 2021-12-30

## 2021-12-30 NOTE — TELEPHONE ENCOUNTER
Patient called [facility] that he was returning our call . Patient wishes to do a one time switch from Dr Naveen Mortensen to Dr Nuvia Gold. Patient had just had a Colonoscopy with Dr Deepika Gary on 12/17/2021 and was told that he needs to see a GI specialist and was referred to Dr Nuvia Gold . In looking at the patient's insurance he has [employer] with the prefix of [address] which we are out of network for . I explained that we are out of network which means that he would be responsible for the bill . He states that we just took his insurance with the other procedure . Patient is very upset and feels he is being discriminated against . Patient wants to keep the appointment with Dr Nuvia Gold and understands he would be responsible for the bill. Please have [place]  call patient at [phone]  to explain how this happened.  Thank You

## 2021-12-30 NOTE — TELEPHONE ENCOUNTER
did a colonoscopy and wanted patient to follow up with Li Dorado. He noticed patient previously saw Bridget Zhang and did not know how our office wanted to go about scheduling. Writer called patient. No answer. LVM informing patient that he should be scheduled with his previous provider. Advised him to call the office at [phone] to make new patient appointment with correct provider. Informed him of provider switch policy as well.

## 2022-01-05 NOTE — TELEPHONE ENCOUNTER
Writer received verbal message from Dr Abdias Neves procedure  that patient want to speak with someone regarding f/u hanny. Patient has [place] [facility] which we do not take. Writer return the patient's call, patient upset that No manager has call him. Patient want to know how this all happen since he has had this insurance for 3yrs and was previously seen by Dr Anant Marie and Dr Josse Huffman and his just  finding this out. Patient want to talk to  for further explanation.

## 2022-02-16 ENCOUNTER — OFFICE VISIT (OUTPATIENT)
Dept: GASTROENTEROLOGY | Age: 57
End: 2022-02-16
Payer: MEDICARE

## 2022-02-16 VITALS — BODY MASS INDEX: 27.37 KG/M2 | DIASTOLIC BLOOD PRESSURE: 73 MMHG | SYSTOLIC BLOOD PRESSURE: 148 MMHG | WEIGHT: 199 LBS

## 2022-02-16 DIAGNOSIS — D12.6 ADENOMATOUS POLYP OF COLON, UNSPECIFIED PART OF COLON: Primary | ICD-10-CM

## 2022-02-16 PROCEDURE — G8484 FLU IMMUNIZE NO ADMIN: HCPCS | Performed by: INTERNAL MEDICINE

## 2022-02-16 PROCEDURE — G8427 DOCREV CUR MEDS BY ELIG CLIN: HCPCS | Performed by: INTERNAL MEDICINE

## 2022-02-16 PROCEDURE — G8419 CALC BMI OUT NRM PARAM NOF/U: HCPCS | Performed by: INTERNAL MEDICINE

## 2022-02-16 PROCEDURE — 4004F PT TOBACCO SCREEN RCVD TLK: CPT | Performed by: INTERNAL MEDICINE

## 2022-02-16 PROCEDURE — 99203 OFFICE O/P NEW LOW 30 MIN: CPT | Performed by: INTERNAL MEDICINE

## 2022-02-16 PROCEDURE — 3017F COLORECTAL CA SCREEN DOC REV: CPT | Performed by: INTERNAL MEDICINE

## 2022-02-16 ASSESSMENT — ENCOUNTER SYMPTOMS
RESPIRATORY NEGATIVE: 1
ABDOMINAL DISTENTION: 1
EYES NEGATIVE: 1
ALLERGIC/IMMUNOLOGIC NEGATIVE: 1

## 2022-02-16 NOTE — PROGRESS NOTES
Reason for Referral: Advanced polyps        Chief Complaint   Patient presents with    New Patient     colon polyps, had colon in dec w/ dr Ba Saravia: Ericka Quezada Sr. is a 49 y.o. male with a past history remarkable for HVT, referred for evaluation of endoscopic removal of advanced polyps. Patient is an active smoker and social drinker. Uses marijuana. Underwent a recent screening colonoscopy the patient was identified to have several polyps approximately 14 total.  Some were observed to be greater than 10 mm which are categorized into advance polyps. Patient referred to gastroenterology for endoscopic removal possible EMR of residual polyps. Smoker: Active 1 ppd x 25 yrs   Drinking history: Socially   Illicit drugs: Marijuana. Abdominal surgeries:None   Prior Colonoscopy: 12/2021   Prior EGD: None   FH of GI issues: Kidney Ca- sister. Past Medical,Family, and Social History reviewed and does contribute to the patient presentingcondition. Patient's PMH/PSH,SH,PSYCH Hx, MEDs, ALLERGIES, and ROS were all reviewed and updated in the appropriate sections. 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 Mild pan-diverticulosis was noted. A 13 mm Makenzie IIa lesion was noted in transverse colon. Lifting was achieved by submucosal injection of saline. The polyp was resected using hot snare. Two hemoclips were placed to close mucosal defect.  A 4 mm polyp was noted in descending colon- removed by cold biopsy    COLONOSCOPY  12/17/2021    COLORECTAL CANCER SCREENING, NOT HIGH RISK     COLONOSCOPY N/A 12/17/2021    COLORECTAL CANCER SCREENING, NOT HIGH RISK performed by Gatito Hayward DO at [address] [facility] COLONOSCOPY W/BIOPSY SINGLE/MULTIPLE N/A 2/20/2018    COLONOSCOPY WITH BIOPSY performed by Juaquin James MD at [facility] [address]:    Current Outpatient Medications:     metoprolol tartrate (LOPRESSOR) 25 MG tablet, Take 0.5 tablets by mouth 2 times daily, Disp: 30 tablet, Rfl: 0    folic acid (FOLVITE) 1 MG tablet, Take 1 tablet by mouth daily, Disp: 30 tablet, Rfl: 5    omeprazole (PRILOSEC) 20 MG delayed release capsule, Take 1 capsule by mouth daily, Disp: 30 capsule, Rfl: 5    sildenafil (VIAGRA) 25 MG tablet, Take 1 tablet by mouth as needed for Erectile Dysfunction, Disp: 10 tablet, Rfl: 0    thiamine 100 MG tablet, Take 1 tablet by mouth daily, Disp: 30 tablet, Rfl: 5    clotrimazole-betamethasone (LOTRISONE) 1-0.05 % cream, apply to affected area twice a day, Disp: 45 g, Rfl: 1    aspirin (RA ASPIRIN EC ADULT LOW ST) 81 MG EC tablet, Take 1 tablet by mouth daily, Disp: 30 tablet, Rfl: 5    Omega-3 Fatty Acids (FISH OIL) 1000 MG CAPS, Take 1,000 mg by mouth daily, Disp: , Rfl:     Multiple Vitamins-Minerals (ONE DAILY ADULTS 50+ PO), Take by mouth, Disp: , Rfl:     ibuprofen (ADVIL;MOTRIN) 800 MG tablet, Take 1 tablet by mouth every 8 hours as needed for Pain With food, Disp: 90 tablet, Rfl: 5    gabapentin (NEURONTIN) 300 MG capsule, Take 1 capsule by mouth 3 times daily for 30 days. , Disp: 90 capsule, Rfl: 5    ALLERGIES:   No Known Allergies    FAMILY HISTORY:       Problem Relation Age of Onset    Stroke Mother          SOCIAL HISTORY:   Social History     Socioeconomic History    Marital status:      Spouse name: Not on file    Number of children: Not on file    Years of education: Not on file    Highest education level: Not on file   Occupational History    Not on file   Tobacco Use    Smoking status: Current Every Day Smoker     Packs/day: 1.00     Years: 36.00     Pack years: 36.00     Types: Cigarettes     Start date: 11/30/1985    Smokeless tobacco: Never Used   Vaping Use    Vaping Use: Never used   Substance and Sexual Activity    Alcohol use: Yes     Types: 1 - 2 Cans of beer per week     Comment: occassional    Drug use: Yes     Frequency: 2.0 times per week     Types: Marijuana Gelene Chasten)    Sexual activity: Yes     Partners: Female   Other Topics Concern    Not on file   Social History Narrative    Not on file     Social Determinants of Health     Financial Resource Strain: Low Risk     Difficulty of Paying Living Expenses: Not hard at all   Food Insecurity: No Food Insecurity    Worried About Running Out of Food in the Last Year: Never true    Kevan of Food in the Last Year: Never true   Transportation Needs: No Transportation Needs    Lack of Transportation (Medical): No    Lack of Transportation (Non-Medical): No   Physical Activity:     Days of Exercise per Week: Not on file    Minutes of Exercise per Session: Not on file   Stress:     Feeling of Stress : Not on file   Social Connections:     Frequency of Communication with Friends and Family: Not on file    Frequency of Social Gatherings with Friends and Family: Not on file    Attends Religion Services: Not on file    Active Member of [address] [place] [address] or Organizations: Not on file    Attends Club or Organization Meetings: Not on file    Marital Status: Not on file   Intimate Partner Violence:     Fear of Current or Ex-Partner: Not on file    Emotionally Abused: Not on file    Physically Abused: Not on file    Sexually Abused: Not on file   Housing Stability:     Unable to Pay for Housing in the Last Year: Not on file    Number of [place] in the Last Year: Not on file    Unstable Housing in the Last Year: Not on file         REVIEW OF SYSTEMS: A 12-point review of systems was obtained and pertinent positives and negatives were listed below. REVIEW OF SYSTEMS:     Constitutional: No fever, no chills, no lethargy, no weakness. HEENT:  No headache, otalgia, itchy eyes, nasal discharge or sore throat. Cardiac:  No chest pain, dyspnea, orthopnea or PND. Chest:   No cough, phlegm or wheezing. Abdomen:      Detailed by MA   Neuro:  No focal weakness, abnormal movements or seizure like activity. Skin:   No rashes, no itching. :   No hematuria, no pyuria, no dysuria, no flank pain. Extremities:  No swelling or joint pains. ROS was otherwise negative    Review of Systems   Constitutional: Negative. HENT: Negative. Eyes: Negative. Respiratory: Negative. Cardiovascular: Negative. Gastrointestinal: Positive for abdominal distention. Endocrine: Negative. Genitourinary: Negative. Musculoskeletal: Negative. Skin: Negative. Allergic/Immunologic: Negative. Neurological: Negative. Hematological: Negative. Psychiatric/Behavioral: Negative. PHYSICAL EXAMINATION: Vital signs reviewed per the nursing documentation. BP (!) 152/77   Wt 199 lb (90.3 kg)   BMI 27.37 kg/m²   Body mass index is 27.37 kg/m². Physical Exam    Physical Exam   Constitutional: Patient is oriented to person, place, and time. Patient appears well-developed and well-nourished. HENT:   Head: Normocephalic and atraumatic. Eyes: Pupils are equal, round, and reactive to light. EOM are normal.   Neck: Normal range of motion. Neck supple. No JVD present. No tracheal deviation present. No thyromegaly present. Cardiovascular: Normal rate, regular rhythm, normal heart sounds and intact distal pulses. Pulmonary/Chest: Effort normal and breath sounds normal. No stridor. No respiratory distress. He has no wheezes. He has no rales. He exhibits no tenderness. Abdominal: Soft. Bowel sounds are normal. He exhibits no distension and no mass. There is no tenderness. There is no rebound and no guarding. No hernia.    Musculoskeletal: Normal range of motion. Lymphadenopathy:    Patient has no cervical adenopathy. Neurological: Patient is alert and oriented to person, place, and time. Psychiatric: Patient has a normal mood and affect. Patient behavior is normal.       LABORATORY DATA: Reviewed  Lab Results   Component Value Date    WBC 11.8 (H) 11/11/2021    HGB 17.1 (H) 11/11/2021    HCT 51.5 (H) 11/11/2021    MCV 85.4 11/11/2021     11/11/2021     11/11/2021    K 4.1 11/11/2021     11/11/2021    CO2 21 11/11/2021    BUN 11 11/11/2021    CREATININE 0.89 11/11/2021    LABALBU 4.3 11/11/2021    BILITOT 0.51 11/11/2021    ALKPHOS 86 11/11/2021    AST 16 11/11/2021    ALT 15 11/11/2021    INR 2.49 (H) 10/13/2017         Lab Results   Component Value Date    RBC 6.03 (H) 11/11/2021    HGB 17.1 (H) 11/11/2021    MCV 85.4 11/11/2021    MCH 28.4 11/11/2021    MCHC 33.2 11/11/2021    RDW 13.2 11/11/2021    MPV 10.0 11/11/2021    BASOPCT 1 11/11/2021    LYMPHSABS 2.06 11/11/2021    MONOSABS 0.61 11/11/2021    NEUTROABS 8.66 (H) 11/11/2021    EOSABS 0.36 11/11/2021    BASOSABS 0.07 11/11/2021         DIAGNOSTIC TESTING:     No results found. IMPRESSION: Helga Aldridge Sr. is a 49 y.o. male with a past history remarkable for HVT, referred for evaluation of endoscopic removal of advanced polyps. Patient is an active smoker and social drinker. Uses marijuana. Underwent a recent screening colonoscopy the patient was identified to have several polyps approximately 14 total.  Some were observed to be greater than 10 mm which are categorized into advance polyps. Patient referred to gastroenterology for endoscopic removal possible EMR of residual polyps. Assessment  Colon polyps    PLAN:    1) residual adenomatous polyps of the colon-multiple, plan for diagnostic colonoscopy at [facility]. Bowel prep instructions were provided. Recent endoscopic findings and pathology findings discussed with the patient.   Risks, benefits, alternative discussed with the patient. He agreed to proceed with the procedure. 2) Follow up after procedure. Thank you for allowing me to participate in the care of Mr. Libby France. For any further questions please do not hesitate to contact me. I have reviewed and agree with the MA/LPN ROS please refer to their documentation from today's encounter on a separate note. Geraldo Laughlin MD, MPH   [facility]  Office #: [phone]          this note is created with the assistance of a speech recognition program.  While intending to generate a document that actually reflects the content of the visit, the document can still have some errors including those of syntax and sound a like substitutions which may escape proof reading. It such instances, actual meaning can be extrapolated by contextual diversion.

## 2022-02-17 RX ORDER — BISACODYL 5 MG
TABLET, DELAYED RELEASE (ENTERIC COATED) ORAL
Qty: 4 TABLET | Refills: 0 | Status: SHIPPED | OUTPATIENT
Start: 2022-02-17 | End: 2022-03-29

## 2022-02-17 RX ORDER — POLYETHYLENE GLYCOL 3350 17 G/17G
POWDER, FOR SOLUTION ORAL
Qty: 238 G | Refills: 0 | Status: SHIPPED | OUTPATIENT
Start: 2022-02-17 | End: 2022-03-29

## 2022-02-24 ENCOUNTER — HOSPITAL ENCOUNTER (OUTPATIENT)
Dept: LAB | Age: 57
Setting detail: SPECIMEN
Discharge: HOME | End: 2022-02-24
Payer: MEDICARE

## 2022-02-24 DIAGNOSIS — Z01.818 PRE-OP TESTING: Primary | ICD-10-CM

## 2022-02-24 PROCEDURE — U0003 INFECTIOUS AGENT DETECTION BY NUCLEIC ACID (DNA OR RNA); SEVERE ACUTE RESPIRATORY SYNDROME CORONAVIRUS 2 (SARS-COV-2) (CORONAVIRUS DISEASE [COVID-19]), AMPLIFIED PROBE TECHNIQUE, MAKING USE OF HIGH THROUGHPUT TECHNOLOGIES AS DESCRIBED BY CMS-2020-01-R: HCPCS

## 2022-02-24 PROCEDURE — U0005 INFEC AGEN DETEC AMPLI PROBE: HCPCS

## 2022-02-25 LAB
SARS-COV-2: NORMAL
SARS-COV-2: NOT DETECTED
SOURCE: NORMAL

## 2022-02-28 ENCOUNTER — ANESTHESIA EVENT (OUTPATIENT)
Dept: ENDOSCOPY | Age: 57
End: 2022-02-28
Payer: MEDICARE

## 2022-02-28 ENCOUNTER — ANESTHESIA (OUTPATIENT)
Dept: ENDOSCOPY | Age: 57
End: 2022-02-28
Payer: MEDICARE

## 2022-02-28 ENCOUNTER — HOSPITAL ENCOUNTER (OUTPATIENT)
Age: 57
Setting detail: OUTPATIENT SURGERY
Discharge: HOME | End: 2022-02-28
Attending: INTERNAL MEDICINE | Admitting: INTERNAL MEDICINE
Payer: MEDICARE

## 2022-02-28 VITALS
DIASTOLIC BLOOD PRESSURE: 67 MMHG | SYSTOLIC BLOOD PRESSURE: 98 MMHG | OXYGEN SATURATION: 95 % | RESPIRATION RATE: 21 BRPM

## 2022-02-28 VITALS
TEMPERATURE: 95.6 F | WEIGHT: 200 LBS | OXYGEN SATURATION: 97 % | RESPIRATION RATE: 22 BRPM | DIASTOLIC BLOOD PRESSURE: 75 MMHG | SYSTOLIC BLOOD PRESSURE: 143 MMHG | HEIGHT: 72 IN | BODY MASS INDEX: 27.09 KG/M2 | HEART RATE: 57 BPM

## 2022-02-28 PROCEDURE — 45385 COLONOSCOPY W/LESION REMOVAL: CPT | Performed by: INTERNAL MEDICINE

## 2022-02-28 PROCEDURE — 88305 TISSUE EXAM BY PATHOLOGIST: CPT

## 2022-02-28 PROCEDURE — 3609010200 HC COLONOSCOPY ABLATION TUMOR POLYP/OTHER LES: Performed by: INTERNAL MEDICINE

## 2022-02-28 PROCEDURE — 2580000003 HC RX 258: Performed by: INTERNAL MEDICINE

## 2022-02-28 PROCEDURE — 2709999900 HC NON-CHARGEABLE SUPPLY: Performed by: INTERNAL MEDICINE

## 2022-02-28 PROCEDURE — 6360000002 HC RX W HCPCS: Performed by: NURSE ANESTHETIST, CERTIFIED REGISTERED

## 2022-02-28 PROCEDURE — 7100000010 HC PHASE II RECOVERY - FIRST 15 MIN: Performed by: INTERNAL MEDICINE

## 2022-02-28 PROCEDURE — 2500000003 HC RX 250 WO HCPCS: Performed by: NURSE ANESTHETIST, CERTIFIED REGISTERED

## 2022-02-28 PROCEDURE — 3700000001 HC ADD 15 MINUTES (ANESTHESIA): Performed by: INTERNAL MEDICINE

## 2022-02-28 PROCEDURE — 7100000011 HC PHASE II RECOVERY - ADDTL 15 MIN: Performed by: INTERNAL MEDICINE

## 2022-02-28 PROCEDURE — 3700000000 HC ANESTHESIA ATTENDED CARE: Performed by: INTERNAL MEDICINE

## 2022-02-28 PROCEDURE — 45380 COLONOSCOPY AND BIOPSY: CPT | Performed by: INTERNAL MEDICINE

## 2022-02-28 PROCEDURE — 45390 COLONOSCOPY W/RESECTION: CPT | Performed by: INTERNAL MEDICINE

## 2022-02-28 RX ORDER — SODIUM CHLORIDE 9 MG/ML
INJECTION, SOLUTION INTRAVENOUS CONTINUOUS
Status: DISCONTINUED | OUTPATIENT
Start: 2022-02-28 | End: 2022-02-28 | Stop reason: HOSPADM

## 2022-02-28 RX ORDER — PROPOFOL 10 MG/ML
INJECTION, EMULSION INTRAVENOUS PRN
Status: DISCONTINUED | OUTPATIENT
Start: 2022-02-28 | End: 2022-02-28 | Stop reason: SDUPTHER

## 2022-02-28 RX ORDER — LIDOCAINE HYDROCHLORIDE 10 MG/ML
INJECTION, SOLUTION EPIDURAL; INFILTRATION; INTRACAUDAL; PERINEURAL PRN
Status: DISCONTINUED | OUTPATIENT
Start: 2022-02-28 | End: 2022-02-28 | Stop reason: SDUPTHER

## 2022-02-28 RX ADMIN — PROPOFOL 120 MCG/KG/MIN: 10 INJECTION, EMULSION INTRAVENOUS at 11:00

## 2022-02-28 RX ADMIN — PROPOFOL 50 MG: 10 INJECTION, EMULSION INTRAVENOUS at 10:58

## 2022-02-28 RX ADMIN — SODIUM CHLORIDE: 9 INJECTION, SOLUTION INTRAVENOUS at 10:46

## 2022-02-28 RX ADMIN — SODIUM CHLORIDE: 9 INJECTION, SOLUTION INTRAVENOUS at 11:30

## 2022-02-28 RX ADMIN — LIDOCAINE HYDROCHLORIDE 50 MG: 10 INJECTION, SOLUTION EPIDURAL; INFILTRATION; INTRACAUDAL; PERINEURAL at 10:58

## 2022-02-28 ASSESSMENT — PAIN SCALES - GENERAL
PAINLEVEL_OUTOF10: 0

## 2022-02-28 ASSESSMENT — LIFESTYLE VARIABLES: SMOKING_STATUS: 1

## 2022-02-28 NOTE — OP NOTE
Operative Note      Patient: Austen Vasquez Sr. YOB: 1965  MRN: [id number]    Date of Procedure: 2/28/2022    Pre-Op Diagnosis: ADENOMATOUS  POLYPS OF COLON    Post-Op Diagnosis: FAIR PREP, diverticulosis, left sided colon polyps, s/p EMR       Procedure(s):  COLONOSCOPY W/ ENDOSCOPIC MUCOSAL RESECTION    Surgeon(s):  Goran Becerra MD    Assistant:   First Assistant: Laura Bertrand RN    Anesthesia: * No anesthesia type entered *    Estimated Blood Loss (mL): Minimal    Complications: None    Specimens:   ID Type Source Tests Collected by Time Destination   A :  Descending colon polyp Tissue Colon-Descending SURGICAL PATHOLOGY Goran Becerra MD 2/28/2022 1119    B : Sigmoid Bx. Tissue Sigmoid Colon SURGICAL PATHOLOGY Goran Becerra MD 2/28/2022 1124    C : Distal Sigmoid Polyp Tissue Sigmoid Colon SURGICAL PATHOLOGY Goran Becerra MD 2/28/2022 1128    D : Rectal Sigmoid polyps Tissue Rectum SURGICAL PATHOLOGY Goran Becerra MD 2/28/2022 1141        Implants:  * No implants in log *      Drains: * No LDAs found *              [facility]     [facility] ENDOSCOPY     COLONOSCOPY    PROCEDURE DATE: 02/28/22    REFERRING PHYSICIAN: No ref. provider found     PRIMARY CARE PROVIDER: Last Mera MD    ATTENDING PHYSICIAN: Goran Becerra MD     HISTORY: Mr. Grazyna Banerjee is a 49 y.o. male who presents to the [facility] endoscopy unit for colonoscopy. The patient's clinical history is remarkable for HTN, SStrait, prior history of PVT, prior history of advanced polyps, referred for removal of advanced polyps. He is currently medically stable and appropriate for the planned procedure. PREOPERATIVE DIAGNOSIS: previous adenomatous polyp. PROCEDURES:   Transanal Colonoscopy with polypectomy (cold snare), polypectomy (snare cautery), polypectomy (cold biopsy), EMR polypectomy.      POSTPROCEDURE DIAGNOSIS:    FAIR PREP     1) Small hyperplastic appearing polyp in the rectum s/p cold snare removal, 8mm 2) Rectosigmoid polyps, 4 separate. 2 largest measuring approximately 12 mm and 14mm s/p saline lift (~9cc os saline), margins defined, hot snare polypectomy performed using EMR technique to remove lesions en bloc. Other 2 lesion removed using standard hot snare technique with polyps measuring 7mm and 8 mm. No complications at the end of the intervention. 3) Sigmoid polyps, hyperplastic appearing, small, 5-7mm in size, total of 5 separate. All removed via cold biopsy and cold snare technique  4) Proximal descending colon polyp, 7mm, s/p hot snare polypectomy and removal  5) Mild to moderate left sided diverticulosis  6) Mild to moderate external hemorrhoids    MEDICATIONS:     MAC per anesthesia     EBL <10cc      INSTRUMENT: Olympus CF-H180 AL Pediatric flexible Colonoscope. PREPARATION: The nature and character of the procedure as well as risks, benefits, and alternatives were discussed with the patient and informed consent was obtained. Complications were said to include, but were not limited to: medication allergy, medication reaction, cardiovascular and respiratory problems, bleeding, perforation, infection, and/or missed diagnosis. Following arrival in the endoscopy room, the patient was placed in the left lateral decubitus position and final time-out accomplished in the presence of the nursing staff. Baseline vital signs were obtained and reviewed, and IV sedation was subsequently initiated. FINDINGS: Rectal examination demonstrated no significant visible external abnormality and digital palpation was unremarkable. Following adequate conscious sedation the colonoscope was introduced and advanced under direct visualization to the cecum, which was identified by the ileocecal valve and appendiceal orifice. The bowel preparation was felt to be FAIR. This included large amounts of green, thick stool that was mostly able to be adequately irrigated and aspirated. Cecal intubation time was 9 minutes. Once maximally inserted, the endoscope was withdrawn and the mucosa was carefully inspected. The mucosal exam was revealed polyps and diverticulosis (descirbed below). Retroflexion was performed in the rectum and moderate hemorrhoids. Withdrawal time was 30 minutes. IMPRESSION:     FAIR PREP     1) Small hyperplastic appearing polyp in the rectum s/p cold snare removal, 8mm   2) Rectosigmoid polyps, 4 separate. 2 largest measuring approximately 12 mm and 14mm s/p saline lift (~9cc of saline), margins defined, hot snare polypectomy performed using EMR technique to remove lesions en bloc. Other 2 lesion removed using standard hot snare technique with polyps measuring 7mm and 8 mm, respectfully. No complications at the end of the intervention. 3) Sigmoid polyps, hyperplastic appearing, small, 5-7mm in size, total of 5 separate. All removed via cold biopsy and cold snare technique  4) Proximal descending colon polyp, 7mm, s/p hot snare polypectomy and removal  5) Mild to moderate left sided diverticulosis  6) Mild to moderate external hemorrhoids    RECOMMENDATIONS:   1) Follow up with referring provider, as previously scheduled. 2) Repeat Colonoscopy in 12 months. Follow up path in GI clinic. Avoid NSAIDs and heavy lifting for next 72 hrs. Increase dietary fiber. Strongly recommend smoking cessation. Terry Alston MD  [facility]   02/28/22    This note is created with the assistance of a speech recognition program.  While intending to generate a document that actually reflects the content of the visit, the document can still have some errors including those of syntax and sound a like substitutions which may escape proof reading. It such instances, actual meaning can be extrapolated by contextual diversion. The patient was counseled at length about the risks of jonathan Covid-19 during their perioperative period and any recovery window from their procedure.   The patient was made aware that jonathan Covid-19  may worsen their prognosis for recovering from their procedure  and lend to a higher morbidity and/or mortality risk. All material risks, benefits, and reasonable alternatives including postponing the procedure were discussed. The patient DOES wish to proceed with the procedure at this time.         Electronically signed by Dexter Hernandez MD on 2/28/2022 at 11:51 AM

## 2022-02-28 NOTE — H&P
History and Physical    Pt Name: Stan Ordonez Sr. MRN: [id number]  YOB: 1965  Date of evaluation: 2/28/2022    SUBJECTIVE:   History of Chief Complaint:    Patient presents preprocedure for colonoscopy. He has a history of colon polyps, had seven removed a few months ago. He says that he has been scheduled today for removal of another seven polyps. He denies GI complaints currently. Past Medical History    has a past medical history of ADHD (attention deficit hyperactivity disorder), Gastroesophageal reflux disease without esophagitis, Hepatic vein thrombosis ([address] Hypertension, and Osteoarthritis. Past Surgical History   has a past surgical history that includes Achilles tendon surgery (2010); Colonoscopy (02/20/2018); pr colonoscopy w/biopsy single/multiple (N/A, 2/20/2018); Colonoscopy (12/17/2021); and Colonoscopy (N/A, 12/17/2021). Medications  Prior to Admission medications    Medication Sig Start Date End Date Taking? Authorizing Provider   polyethylene glycol (GLYCOLAX) 17 GM/SCOOP powder Use as directed following your patient instructions given by office 2/17/22   Nancy Velasquez MD   bisacodyl (BISACODYL) 5 MG EC tablet Take 4 tabs at 10am the day prior to Colonoscopy 2/17/22   Nancy Velasquez MD   metoprolol tartrate (LOPRESSOR) 25 MG tablet Take 0.5 tablets by mouth 2 times daily 12/28/21   Jenaro Acosta MD   folic acid (FOLVITE) 1 MG tablet Take 1 tablet by mouth daily 12/28/21   Jenaro Acosta MD   omeprazole (PRILOSEC) 20 MG delayed release capsule Take 1 capsule by mouth daily 12/28/21   Jenaro Acosta MD   gabapentin (NEURONTIN) 300 MG capsule Take 1 capsule by mouth 3 times daily for 30 days.  12/28/21 1/27/22  Jenaro Acosta MD   sildenafil (VIAGRA) 25 MG tablet Take 1 tablet by mouth as needed for Erectile Dysfunction 12/28/21   Jenaro Acosta MD   thiamine 100 MG tablet Take 1 tablet by mouth daily 12/28/21   Jenaro Acosta MD   clotrimazole-betamethasone (LOTRISONE) 1-0.05 % cream apply to affected area twice a day 10/19/21   Holly Nelson MD   aspirin (RA ASPIRIN EC ADULT LOW ST) 81 MG EC tablet Take 1 tablet by mouth daily 10/19/21   Holly Nelson MD   Omega-3 Fatty Acids (FISH OIL) 1000 MG CAPS Take 1,000 mg by mouth daily    Historical Provider, MD   Multiple Vitamins-Minerals (ONE DAILY ADULTS 50+ PO) Take by mouth    Historical Provider, MD   ibuprofen (ADVIL;MOTRIN) 800 MG tablet Take 1 tablet by mouth every 8 hours as needed for Pain With food 5/21/20   Holly Nelson MD     Allergies  has No Known Allergies. Family History  family history includes Stroke in his mother. Social History   reports that he has been smoking cigarettes. He started smoking about 36 years ago. He has a 36.00 pack-year smoking history. He has never used smokeless tobacco.   reports current alcohol use. reports current drug use. Frequency: 2.00 times per week. Drug: Marijuana Gelene Chasten). Marital Status   Occupation disabled    Review of Systems:  CONSTITUTIONAL:   negative for fevers, chills, fatigue and malaise    EYES:   negative for double vision, blurred vision and photophobia    HEENT:   negative for tinnitus, epistaxis and sore throat     RESPIRATORY:   negative for cough, shortness of breath, wheezing     CARDIOVASCULAR:   negative for chest pain, palpitations, syncope, edema     GASTROINTESTINAL:   See HPI   GENITOURINARY:   negative for incontinence     MUSCULOSKELETAL:   negative for neck or back pain     NEUROLOGICAL:   Negative for weakness and tingling  negative for headaches and dizziness     PSYCHIATRIC:   negative for anxiety         OBJECTIVE:   VITALS:  height is 5' 11.5\" (1.816 m) and weight is 200 lb (90.7 kg). His temporal temperature is 97.1 °F (36.2 °C). His blood pressure is 149/85 (abnormal) and his pulse is 70. His respiration is 15 and oxygen saturation is 97%. CONSTITUTIONAL:alert & cooperative, no acute distress. Very pleasant.   SKIN:  Warm and dry, no rashes on exposed areas of skin   HEAD:  Normocephalic, atraumatic   EYES: EOMs intact. EARS:  Hearing grossly WNL. NOSE:  Nares patent. No rhinorrhea. MOUTH/THROAT:  benign  NECK:supple, no lymphadenopathy  LUNGS: Clear to auscultation bilaterally, no wheezes. CARDIOVASCULAR: Heart sounds are normal.  Regular rate and rhythm without murmur. ABDOMEN: soft, non tender, non distended. EXTREMITIES: no edema bilateral lower extremities. IMPRESSIONS:   1. History of colon polyps  2.  has a past medical history of ADHD (attention deficit hyperactivity disorder), Gastroesophageal reflux disease without esophagitis (2/16/2018), Hepatic vein thrombosis ([facility] [address] (6/28/2017), Hypertension, and Osteoarthritis.    PLANS:   1. colonoscopy    LUCY Bassett PA-C  Electronically signed 2/28/2022 at 10:41 AM

## 2022-02-28 NOTE — ANESTHESIA POSTPROCEDURE EVALUATION
Department of Anesthesiology  Postprocedure Note    Patient: Jacque Mclaughlin Sr. MRN: [id number]  YOB: 1965  Date of evaluation: 2/28/2022  Time:  12:59 PM     Procedure Summary     Date: 02/28/22 Room / Location: [address]    Anesthesia Start: 1055 Anesthesia Stop: 0598    Procedure: COLONOSCOPY W/ ENDOSCOPIC MUCOSAL RESECTION Diagnosis: (ADENOMATOUS  POLYPS OF COLON)    Surgeons: Danielle Garrido MD Responsible Provider: Balta Villa MD    Anesthesia Type: MAC ASA Status: 3          Anesthesia Type: MAC    Cale Phase I: Cale Score: 10    Cale Phase II: Cale Score: 10    Last vitals: Reviewed and per EMR flowsheets.        Anesthesia Post Evaluation    Patient location during evaluation: bedside  Patient participation: complete - patient participated  Level of consciousness: awake and alert  Nausea & Vomiting: nausea  Cardiovascular status: hemodynamically stable  Respiratory status: room air

## 2022-02-28 NOTE — ANESTHESIA PRE PROCEDURE
Department of Anesthesiology  Preprocedure Note       Name:  Cathie Godinez Sr.   Age:  49 y.o.  :  1965                                          MRN:  [id number]         Date:  2022      Surgeon: Randall Layton):  Joyce Donis MD    Procedure: Procedure(s):  COLONOSCOPY DIAGNOSTIC    Medications prior to admission:   Prior to Admission medications    Medication Sig Start Date End Date Taking? Authorizing Provider   polyethylene glycol (GLYCOLAX) 17 GM/SCOOP powder Use as directed following your patient instructions given by office 22   Joyce Donis MD   bisacodyl (BISACODYL) 5 MG EC tablet Take 4 tabs at 10am the day prior to Colonoscopy 22   Joyce Donis MD   metoprolol tartrate (LOPRESSOR) 25 MG tablet Take 0.5 tablets by mouth 2 times daily 21   Criselda Coppola MD   folic acid (FOLVITE) 1 MG tablet Take 1 tablet by mouth daily 21   Criselda Coppola MD   omeprazole (PRILOSEC) 20 MG delayed release capsule Take 1 capsule by mouth daily 21   Criselda Coppola MD   gabapentin (NEURONTIN) 300 MG capsule Take 1 capsule by mouth 3 times daily for 30 days.  21  Criselda Coppola MD   sildenafil (VIAGRA) 25 MG tablet Take 1 tablet by mouth as needed for Erectile Dysfunction 21   Criselda Coppola MD   thiamine 100 MG tablet Take 1 tablet by mouth daily 21   Criselda Coppola MD   clotrimazole-betamethasone (LOTRISONE) 1-0.05 % cream apply to affected area twice a day 10/19/21   Criselda Coppola MD   aspirin (RA ASPIRIN EC ADULT LOW ST) 81 MG EC tablet Take 1 tablet by mouth daily 10/19/21   Criselda Coppola MD   Omega-3 Fatty Acids (FISH OIL) 1000 MG CAPS Take 1,000 mg by mouth daily    Historical Provider, MD   Multiple Vitamins-Minerals (ONE DAILY ADULTS 50+ PO) Take by mouth    Historical Provider, MD   ibuprofen (ADVIL;MOTRIN) 800 MG tablet Take 1 tablet by mouth every 8 hours as needed for Pain With food 20   Criselda Coppola MD       Current medications:    Current Facility-Administered Medications   Medication Dose Route Frequency Provider Last Rate Last Admin    0.9 % sodium chloride infusion   IntraVENous Continuous Geraldo Laughlin MD           Allergies:  No Known Allergies    Problem List:    Patient Active Problem List   Diagnosis Code    Essential hypertension I10    Sickle cell trait ([facility] [address] D57.3    Primary osteoarthritis of knee M17.10    Portal vein thrombosis, june 2017, completed course of warfarin  I81    Gastroesophageal reflux disease without esophagitis K21.9    Chronic pain of right ankle M25.571, G89.29       Past Medical History:        Diagnosis Date    ADHD (attention deficit hyperactivity disorder)     Gastroesophageal reflux disease without esophagitis 2/16/2018    Hepatic vein thrombosis ([facility] [address] 6/28/2017    Hypertension     Osteoarthritis        Past Surgical History:        Procedure Laterality Date    ACHILLES TENDON SURGERY  2010    right foot    COLONOSCOPY  02/20/2018    The mucosal exam was normal. Mild pan-diverticulosis was noted. A 13 mm Makenzie IIa lesion was noted in transverse colon. Lifting was achieved by submucosal injection of saline. The polyp was resected using hot snare. Two hemoclips were placed to close mucosal defect.  A 4 mm polyp was noted in descending colon- removed by cold biopsy    COLONOSCOPY  12/17/2021    COLORECTAL CANCER SCREENING, NOT HIGH RISK     COLONOSCOPY N/A 12/17/2021    COLORECTAL CANCER SCREENING, NOT HIGH RISK performed by Matty Espinoza DO at [address] W/BIOPSY SINGLE/MULTIPLE N/A 2/20/2018    COLONOSCOPY WITH BIOPSY performed by Lillian Carvajal MD at [address] [facility] History:    Social History     Tobacco Use    Smoking status: Current Every Day Smoker     Packs/day: 1.00     Years: 36.00     Pack years: 36.00     Types: Cigarettes     Start date: 11/30/1985    Smokeless tobacco: Never Used   Substance Use Topics    Alcohol use: Yes     Types: 1 - 2 Cans of beer per week     Comment: occassional                                Ready to quit: Not Answered  Counseling given: Not Answered      Vital Signs (Current):   Vitals:    02/28/22 1038   BP: (!) 149/85   Pulse: 70   Resp: 15   Temp: 97.1 °F (36.2 °C)   TempSrc: Temporal   SpO2: 97%   Weight: 200 lb (90.7 kg)   Height: 5' 11.5\" (1.816 m)                                              BP Readings from Last 3 Encounters:   02/28/22 (!) 149/85   02/16/22 (!) 148/73   12/28/21 (!) 144/79       NPO Status:  MN                                                                               BMI:   Wt Readings from Last 3 Encounters:   02/28/22 200 lb (90.7 kg)   02/16/22 199 lb (90.3 kg)   12/28/21 202 lb 9.6 oz (91.9 kg)     Body mass index is 27.51 kg/m². CBC:   Lab Results   Component Value Date    WBC 11.8 11/11/2021    RBC 6.03 11/11/2021    HGB 17.1 11/11/2021    HCT 51.5 11/11/2021    MCV 85.4 11/11/2021    RDW 13.2 11/11/2021     11/11/2021       CMP:   Lab Results   Component Value Date     11/11/2021    K 4.1 11/11/2021     11/11/2021    CO2 21 11/11/2021    BUN 11 11/11/2021    CREATININE 0.89 11/11/2021    GFRAA >60 11/11/2021    LABGLOM >60 11/11/2021    GLUCOSE 114 11/11/2021    GLUCOSE 132 05/08/2012    PROT 7.9 11/11/2021    CALCIUM 8.8 11/11/2021    BILITOT 0.51 11/11/2021    ALKPHOS 86 11/11/2021    AST 16 11/11/2021    ALT 15 11/11/2021       POC Tests: No results for input(s): POCGLU, POCNA, POCK, POCCL, POCBUN, POCHEMO, POCHCT in the last 72 hours.     Coags:   Lab Results   Component Value Date    PROTIME 27.1 10/13/2017    INR 2.49 10/13/2017       HCG (If Applicable): No results found for: PREGTESTUR, PREGSERUM, HCG, HCGQUANT     ABGs: No results found for: PHART, PO2ART, KYC1ZHF, PPX2GRD, BEART, W7DVZDCI     Type & Screen (If Applicable):  No results found for: LABABO, LABRH    Drug/Infectious Status (If Applicable):  Lab Results   Component Value Date    HEPCAB NONREACTIVE 10/19/2021       COVID-19 Screening (If Applicable):   Lab Results   Component Value Date    COVID19 Not Detected 02/24/2022           Anesthesia Evaluation   no history of anesthetic complications:   Airway: Mallampati: III        Dental:          Pulmonary:   (+) current smoker    (-) recent URI                          ROS comment: 36 pk yrs   Cardiovascular:  Exercise tolerance: good (>4 METS),   (+) hypertension:,     (-) past MI                Neuro/Psych:      (-) seizures and CVA           GI/Hepatic/Renal:   (+) GERD:,          ROS comment: Portal vein thrombosis. Endo/Other:        (-) diabetes mellitus                ROS comment: SCT Abdominal:             Vascular: Other Findings: Teeth in terrible repair            Anesthesia Plan      MAC     ASA 3       Induction: intravenous.                           Ankur Lazo MD   2/28/2022

## 2022-02-28 NOTE — TELEPHONE ENCOUNTER
02/01/22- [facility] to call back and reschedule appt  multiple adenomatous polyps of the colon   Patient wants 1 time switch in providers from [facility] to [facility]  [facility] appt reminder [id number] [facility] 2/15/22

## 2022-03-01 LAB — SURGICAL PATHOLOGY REPORT: NORMAL

## 2022-03-29 ENCOUNTER — OFFICE VISIT (OUTPATIENT)
Dept: INTERNAL MEDICINE | Age: 57
End: 2022-03-29
Payer: MEDICARE

## 2022-03-29 VITALS
SYSTOLIC BLOOD PRESSURE: 183 MMHG | HEART RATE: 88 BPM | WEIGHT: 199 LBS | DIASTOLIC BLOOD PRESSURE: 90 MMHG | BODY MASS INDEX: 26.95 KG/M2 | HEIGHT: 72 IN

## 2022-03-29 DIAGNOSIS — I10 ESSENTIAL HYPERTENSION: Primary | ICD-10-CM

## 2022-03-29 DIAGNOSIS — E55.9 VITAMIN D DEFICIENCY: ICD-10-CM

## 2022-03-29 DIAGNOSIS — I10 ESSENTIAL (PRIMARY) HYPERTENSION: ICD-10-CM

## 2022-03-29 DIAGNOSIS — Z72.0 TOBACCO ABUSE: ICD-10-CM

## 2022-03-29 PROCEDURE — 3017F COLORECTAL CA SCREEN DOC REV: CPT | Performed by: INTERNAL MEDICINE

## 2022-03-29 PROCEDURE — 99214 OFFICE O/P EST MOD 30 MIN: CPT | Performed by: INTERNAL MEDICINE

## 2022-03-29 PROCEDURE — G8484 FLU IMMUNIZE NO ADMIN: HCPCS | Performed by: INTERNAL MEDICINE

## 2022-03-29 PROCEDURE — G8427 DOCREV CUR MEDS BY ELIG CLIN: HCPCS | Performed by: INTERNAL MEDICINE

## 2022-03-29 PROCEDURE — 4004F PT TOBACCO SCREEN RCVD TLK: CPT | Performed by: INTERNAL MEDICINE

## 2022-03-29 PROCEDURE — G8419 CALC BMI OUT NRM PARAM NOF/U: HCPCS | Performed by: INTERNAL MEDICINE

## 2022-03-29 RX ORDER — NICOTINE 21 MG/24HR
1 PATCH, TRANSDERMAL 24 HOURS TRANSDERMAL DAILY
Qty: 42 PATCH | Refills: 0 | Status: SHIPPED | OUTPATIENT
Start: 2022-03-29 | End: 2022-04-26

## 2022-03-29 RX ORDER — MELATONIN
1000 DAILY
Qty: 30 TABLET | Refills: 5 | Status: SHIPPED | OUTPATIENT
Start: 2022-03-29 | End: 2022-04-26 | Stop reason: SDUPTHER

## 2022-03-29 SDOH — ECONOMIC STABILITY: TRANSPORTATION INSECURITY
IN THE PAST 12 MONTHS, HAS THE LACK OF TRANSPORTATION KEPT YOU FROM MEDICAL APPOINTMENTS OR FROM GETTING MEDICATIONS?: NO

## 2022-03-29 SDOH — ECONOMIC STABILITY: FOOD INSECURITY: WITHIN THE PAST 12 MONTHS, YOU WORRIED THAT YOUR FOOD WOULD RUN OUT BEFORE YOU GOT MONEY TO BUY MORE.: NEVER TRUE

## 2022-03-29 SDOH — ECONOMIC STABILITY: FOOD INSECURITY: WITHIN THE PAST 12 MONTHS, THE FOOD YOU BOUGHT JUST DIDN'T LAST AND YOU DIDN'T HAVE MONEY TO GET MORE.: NEVER TRUE

## 2022-03-29 ASSESSMENT — SOCIAL DETERMINANTS OF HEALTH (SDOH): HOW HARD IS IT FOR YOU TO PAY FOR THE VERY BASICS LIKE FOOD, HOUSING, MEDICAL CARE, AND HEATING?: NOT HARD AT ALL

## 2022-03-29 ASSESSMENT — ENCOUNTER SYMPTOMS
GASTROINTESTINAL NEGATIVE: 1
ALLERGIC/IMMUNOLOGIC NEGATIVE: 1
EYES NEGATIVE: 1
COUGH: 1

## 2022-03-29 NOTE — PROGRESS NOTES
Roge Child   Progress Note        Date of patient's visit: 3/29/2022  Patient's Name:  Cathie Godinez Sr. YOB: 1965        PCP:  Criselda Coppola MD    Cathie Godinez Sr. is a 49 y.o. male who presents for   Chief Complaint   Patient presents with    Hypertension     3 month follow up     and follow up of chronic medical problems. Patient Active Problem List   Diagnosis    Essential hypertension    Sickle cell trait ([address]    Primary osteoarthritis of knee    Portal vein thrombosis, june 2017, completed course of warfarin     Gastroesophageal reflux disease without esophagitis    Chronic pain of right ankle    Diverticulosis    Polyp of sigmoid colon    Adenomatous polyp of descending colon    Rectal polyp       HISTORY OF PRESENT ILLNESS:    History was obtained from the patient. Hypertension: Patient here for follow-up of elevated blood pressure. He is not exercising and is adherent to low salt diet. Blood pressure is not well controlled at home. Cardiac symptoms none. Patient denies claudication, dyspnea and exertional chest pressure/discomfort. Cardiovascular risk factors: hypertension, male gender and smoking/ tobacco exposure. Use of agents associated with hypertension: none. History of target organ damage: n/a      Patient's allergies, medications, past medical, surgical, social and family histories were reviewed and updated as appropriate.     ALLERGIES    No Known Allergies      MEDICATIONS:      Current Outpatient Medications   Medication Sig Dispense Refill    polyethylene glycol (GLYCOLAX) 17 GM/SCOOP powder Use as directed following your patient instructions given by office 238 g 0    bisacodyl (BISACODYL) 5 MG EC tablet Take 4 tabs at 10am the day prior to Colonoscopy 4 tablet 0    metoprolol tartrate (LOPRESSOR) 25 MG tablet Take 0.5 tablets by mouth 2 times daily 30 tablet 0    folic acid (FOLVITE) 1 MG tablet Take 1 tablet by mouth daily 30 tablet 5    omeprazole (PRILOSEC) 20 MG delayed release capsule Take 1 capsule by mouth daily 30 capsule 5    gabapentin (NEURONTIN) 300 MG capsule Take 1 capsule by mouth 3 times daily for 30 days. 90 capsule 5    sildenafil (VIAGRA) 25 MG tablet Take 1 tablet by mouth as needed for Erectile Dysfunction 10 tablet 0    thiamine 100 MG tablet Take 1 tablet by mouth daily 30 tablet 5    clotrimazole-betamethasone (LOTRISONE) 1-0.05 % cream apply to affected area twice a day 45 g 1    aspirin (RA ASPIRIN EC ADULT LOW ST) 81 MG EC tablet Take 1 tablet by mouth daily 30 tablet 5    Omega-3 Fatty Acids (FISH OIL) 1000 MG CAPS Take 1,000 mg by mouth daily      Multiple Vitamins-Minerals (ONE DAILY ADULTS 50+ PO) Take by mouth      ibuprofen (ADVIL;MOTRIN) 800 MG tablet Take 1 tablet by mouth every 8 hours as needed for Pain With food 90 tablet 5     No current facility-administered medications for this visit. Patient Care Team:  Veda Hong MD as PCP - General (Internal Medicine)  Veda Hong MD as PCP - [facility]  Theresa Lopez MD as Consulting Physician (Vascular Surgery)  Gold Jalloh MD as Surgeon (General Surgery: Claude Duke)  Manny Salinas MD as Consulting Physician (Infectious Diseases)  Pantera Aguilar MD as Consulting Physician (Gastroenterology)  Theresa Lopez MD as Consulting Physician (Vascular Surgery)    PAST MEDICAL AND SURGICAL HISTORY:      Past Medical History:   Diagnosis Date    ADHD (attention deficit hyperactivity disorder)     Gastroesophageal reflux disease without esophagitis 2/16/2018    Hepatic vein thrombosis ([facility] [address] 6/28/2017    Hypertension     Osteoarthritis      Past Surgical History:   Procedure Laterality Date    ACHILLES TENDON SURGERY  2010    right foot    COLONOSCOPY  02/20/2018    The mucosal exam was normal. Mild pan-diverticulosis was noted. A 13 mm Makenzie IIa lesion was noted in transverse colon.  Lifting was achieved by submucosal injection of saline. The polyp was resected using hot snare. Two hemoclips were placed to close mucosal defect. A 4 mm polyp was noted in descending colon- removed by cold biopsy    COLONOSCOPY  12/17/2021    COLORECTAL CANCER SCREENING, NOT HIGH RISK     COLONOSCOPY N/A 12/17/2021    COLORECTAL CANCER SCREENING, NOT HIGH RISK performed by Matty Espinoza DO at [facility]  2/28/2022    COLONOSCOPY W/ ENDOSCOPIC MUCOSAL RESECTION performed by Geraldo Laughlin MD at [facility]    [place] COLONOSCOPY W/BIOPSY SINGLE/MULTIPLE N/A 2/20/2018    COLONOSCOPY WITH BIOPSY performed by Lillian Carvajal MD at [place] [address] History     Tobacco Use    Smoking status: Current Every Day Smoker     Packs/day: 1.00     Years: 36.00     Pack years: 36.00     Types: Cigarettes     Start date: 11/30/1985    Smokeless tobacco: Never Used   Substance Use Topics    Alcohol use: Yes     Types: 1 - 2 Cans of beer per week     Comment: occassional     Miguel Caldera  reports that he has been smoking cigarettes. He started smoking about 36 years ago. He has a 36.00 pack-year smoking history. He has never used smokeless tobacco.    FAMILY HISTORY:      Family History   Problem Relation Age of Onset    Stroke Mother        REVIEW OF SYSTEMS:    Review of Systems   Constitutional: Negative. HENT: Negative. Eyes: Negative. Respiratory: Positive for cough. Cardiovascular: Negative. Gastrointestinal: Negative. Endocrine: Negative. Genitourinary: Negative. Musculoskeletal: Positive for arthralgias. Skin: Negative. Allergic/Immunologic: Negative. Neurological: Negative. Hematological: Negative. Psychiatric/Behavioral: Negative.           PHYSICAL EXAM:      Vitals:    03/29/22 1348   BP: (!) 183/90   Pulse: 88     BP Readings from Last 3 Encounters:   03/29/22 (!) 183/90   02/28/22 (!) 143/75   02/28/22 98/67       Physical Examination: tablet   Increase metoprolol from 12-1/2 twice a day to 25 mg twice a day. Add vitamin D after reviewing previous labs  Patient was counseled on tobacco cessation. Based upon patient's motivation to change his behavior, the following plan was agreed upon: patient will try the following tobacco cessation strategies:  nicotine replacement: patches and gum, risks and benefits of this medication discussed- patient will call for any significant adverse effects. He was provided with a list of local tobacco cessation resources. Provider spent 10 minutes counseling patient. FOLLOW UP:   1. Chely Washbrun received counseling on the following healthy behaviors: nutrition and exercise    2. Reviewed prior labs and health maintenance. 3.  Discussed use, benefit, and side effects of prescribed medications. Barriers to medication compliance addressed. All patient questions answered. Pt voiced understanding. 4.  Continue current medications, diet and exercise.               Orders Placed This Encounter   Medications    metoprolol tartrate (LOPRESSOR) 25 MG tablet     Sig: Take 1 tablet by mouth 2 times daily     Dispense:  60 tablet     Refill:  0    nicotine (NICODERM CQ) 21 MG/24HR     Sig: Place 1 patch onto the skin daily     Dispense:  42 patch     Refill:  0    nicotine polacrilex (NICORETTE) 2 MG gum     Sig: Take 1 each by mouth as needed for Smoking cessation     Dispense:  110 each     Refill:  3    vitamin D3 (CHOLECALCIFEROL) 25 MCG (1000 UT) TABS tablet     Sig: Take 1 tablet by mouth daily     Dispense:  30 tablet     Refill:  5          Completed Refills               Requested Prescriptions     Signed Prescriptions Disp Refills    metoprolol tartrate (LOPRESSOR) 25 MG tablet 60 tablet 0     Sig: Take 1 tablet by mouth 2 times daily    nicotine (NICODERM CQ) 21 MG/24HR 42 patch 0     Sig: Place 1 patch onto the skin daily    nicotine polacrilex (NICORETTE) 2 MG gum 110 each 3     Sig: Take 1 each by mouth as needed for Smoking cessation    vitamin D3 (CHOLECALCIFEROL) 25 MCG (1000 UT) TABS tablet 30 tablet 5     Sig: Take 1 tablet by mouth daily       5. Patient given educational materials - see patient instructions    6. Was a self-tracking handout given in paper form or via [employer]? Yes  If yes, see orders or list here. No orders of the defined types were placed in this encounter. Return in about 4 weeks (around 4/26/2022) for High Blood Pressure. Patient voiced understanding and agreed to treatment plan. This note is created with the assistance of a speech-recognition program. While intending to generate a document that actually reflects the content of the visit, the document can still have some mistakes which may not have been identified and corrected by editing.       Dr Valentina Lopez MD, [address]  Associate , Department of Internal Medicine  Resident Ambulatory Site Medical Director  [facility] Internal Medicine  [facility]  Internal Medicine Clerkship - Lou Campbell                   3/29/2022, 2:26 PM

## 2022-03-29 NOTE — PATIENT INSTRUCTIONS
Medications e-scribe to pharmacy of pt's choice. Patient to return to clinic 4 weeks (4/26/22). AVS reviewed and given to pt. It is very important for your care that you keep your appointment. If for some reason you are unable to keep your appointment it is equally important that you call our office at [phone] to cancel your appointment and reschedule. Failure to do so may result in your termination from our practice.   MB

## 2022-04-26 ENCOUNTER — OFFICE VISIT (OUTPATIENT)
Dept: INTERNAL MEDICINE | Age: 57
End: 2022-04-26
Payer: MEDICARE

## 2022-04-26 VITALS
HEART RATE: 73 BPM | TEMPERATURE: 98.1 F | DIASTOLIC BLOOD PRESSURE: 75 MMHG | WEIGHT: 196 LBS | HEIGHT: 72 IN | OXYGEN SATURATION: 97 % | BODY MASS INDEX: 26.55 KG/M2 | SYSTOLIC BLOOD PRESSURE: 153 MMHG

## 2022-04-26 DIAGNOSIS — N52.9 ERECTILE DYSFUNCTION, UNSPECIFIED ERECTILE DYSFUNCTION TYPE: ICD-10-CM

## 2022-04-26 DIAGNOSIS — D57.3 SICKLE CELL TRAIT (HCC): ICD-10-CM

## 2022-04-26 DIAGNOSIS — M25.571 CHRONIC PAIN OF RIGHT ANKLE: ICD-10-CM

## 2022-04-26 DIAGNOSIS — G89.29 CHRONIC PAIN OF RIGHT ANKLE: ICD-10-CM

## 2022-04-26 DIAGNOSIS — I10 ESSENTIAL (PRIMARY) HYPERTENSION: Primary | ICD-10-CM

## 2022-04-26 DIAGNOSIS — M17.0 PRIMARY OSTEOARTHRITIS OF BOTH KNEES: ICD-10-CM

## 2022-04-26 DIAGNOSIS — E55.9 VITAMIN D DEFICIENCY: ICD-10-CM

## 2022-04-26 DIAGNOSIS — K21.9 GASTROESOPHAGEAL REFLUX DISEASE WITHOUT ESOPHAGITIS: ICD-10-CM

## 2022-04-26 PROCEDURE — G8419 CALC BMI OUT NRM PARAM NOF/U: HCPCS | Performed by: INTERNAL MEDICINE

## 2022-04-26 PROCEDURE — 3017F COLORECTAL CA SCREEN DOC REV: CPT | Performed by: INTERNAL MEDICINE

## 2022-04-26 PROCEDURE — 4004F PT TOBACCO SCREEN RCVD TLK: CPT | Performed by: INTERNAL MEDICINE

## 2022-04-26 PROCEDURE — 99213 OFFICE O/P EST LOW 20 MIN: CPT | Performed by: INTERNAL MEDICINE

## 2022-04-26 PROCEDURE — G8427 DOCREV CUR MEDS BY ELIG CLIN: HCPCS | Performed by: INTERNAL MEDICINE

## 2022-04-26 RX ORDER — HYDROCHLOROTHIAZIDE 25 MG/1
25 TABLET ORAL EVERY MORNING
Qty: 90 TABLET | Refills: 1 | Status: SHIPPED | OUTPATIENT
Start: 2022-04-26 | End: 2022-06-07

## 2022-04-26 RX ORDER — ASPIRIN 81 MG/1
81 TABLET ORAL DAILY
Qty: 30 TABLET | Refills: 5 | Status: SHIPPED | OUTPATIENT
Start: 2022-04-26

## 2022-04-26 RX ORDER — GABAPENTIN 300 MG/1
300 CAPSULE ORAL 3 TIMES DAILY
Qty: 90 CAPSULE | Refills: 5 | Status: SHIPPED | OUTPATIENT
Start: 2022-04-26 | End: 2022-10-11

## 2022-04-26 RX ORDER — LANOLIN ALCOHOL/MO/W.PET/CERES
100 CREAM (GRAM) TOPICAL DAILY
Qty: 30 TABLET | Refills: 5 | Status: SHIPPED | OUTPATIENT
Start: 2022-04-26

## 2022-04-26 RX ORDER — OMEPRAZOLE 20 MG/1
20 CAPSULE, DELAYED RELEASE ORAL DAILY
Qty: 30 CAPSULE | Refills: 5 | Status: SHIPPED | OUTPATIENT
Start: 2022-04-26

## 2022-04-26 RX ORDER — FOLIC ACID 1 MG/1
1000 TABLET ORAL DAILY
Qty: 30 TABLET | Refills: 5 | Status: SHIPPED | OUTPATIENT
Start: 2022-04-26

## 2022-04-26 RX ORDER — SILDENAFIL 25 MG/1
25 TABLET, FILM COATED ORAL PRN
Qty: 10 TABLET | Refills: 0 | Status: SHIPPED | OUTPATIENT
Start: 2022-04-26 | End: 2022-10-11 | Stop reason: SDUPTHER

## 2022-04-26 RX ORDER — MELATONIN
1000 DAILY
Qty: 30 TABLET | Refills: 5 | Status: SHIPPED | OUTPATIENT
Start: 2022-04-26

## 2022-04-26 ASSESSMENT — ENCOUNTER SYMPTOMS
SHORTNESS OF BREATH: 0
COUGH: 0
ABDOMINAL PAIN: 0
BACK PAIN: 0
ABDOMINAL DISTENTION: 0
RHINORRHEA: 0

## 2022-04-26 ASSESSMENT — PATIENT HEALTH QUESTIONNAIRE - PHQ9
SUM OF ALL RESPONSES TO PHQ QUESTIONS 1-9: 0
SUM OF ALL RESPONSES TO PHQ QUESTIONS 1-9: 0
2. FEELING DOWN, DEPRESSED OR HOPELESS: 0
1. LITTLE INTEREST OR PLEASURE IN DOING THINGS: 0
SUM OF ALL RESPONSES TO PHQ9 QUESTIONS 1 & 2: 0
SUM OF ALL RESPONSES TO PHQ QUESTIONS 1-9: 0
SUM OF ALL RESPONSES TO PHQ QUESTIONS 1-9: 0

## 2022-04-26 NOTE — PROGRESS NOTES
[address] [facility]   Progress Note        Date of patient's visit: 4/26/2022  Patient's Name:  Reshma Rothman Sr. YOB: 1965        PCP:  Star Ramesh MD    Reshma Rothman Sr. is a 49 y.o. male who presents for   Chief Complaint   Patient presents with    Hypertension     4 week f/u, medication pended refills are up in [address] Maintenance     wants pneu vaccine, due for CT lung screen, decline covid vaccine    and follow up of chronic medical problems. Patient Active Problem List   Diagnosis    Essential hypertension    Sick[facility] [address]    Primary osteoarthritis of knee    Portal vein thrombosis, june 2017, completed course of warfarin     Gastroesophageal reflux disease without esophagitis    Chronic pain of right ankle    Diverticulosis    Polyp of sigmoid colon    Adenomatous polyp of descending colon    Rectal polyp       HISTORY OF PRESENT ILLNESS:    History was obtained from the patient. Hypertension  Patient is here for follow-up of elevated blood pressure. He is not exercising and is adherent to a low-salt diet. Blood pressure is not well controlled at home. Cardiac symptoms: none. Patient denies chest pressure/discomfort and dyspnea. Cardiovascular risk factors: hypertension, male gender and smoking/ tobacco exposure. Use of agents associated with hypertension: none. History of target organ damage: none. Patient's allergies, medications, past medical, surgical, social and family histories were reviewed and updated as appropriate. ALLERGIES    No Known Allergies      MEDICATIONS:      Current Outpatient Medications   Medication Sig Dispense Refill    sildenafil (VIAGRA) 25 MG tablet Take 1 tablet by mouth as needed for Erectile Dysfunction 10 tablet 0    gabapentin (NEURONTIN) 300 MG capsule Take 1 capsule by mouth 3 times daily for 30 days.  90 capsule 5    omeprazole (PRILOSEC) 20 MG delayed release capsule Take 1 capsule by mouth daily 30 capsule 5    folic acid (FOLVITE) 1 MG tablet Take 1 tablet by mouth daily 30 tablet 5    vitamin D3 (CHOLECALCIFEROL) 25 MCG (1000 UT) TABS tablet Take 1 tablet by mouth daily 30 tablet 5    metoprolol tartrate (LOPRESSOR) 25 MG tablet Take 1 tablet by mouth 2 times daily 60 tablet 0    thiamine 100 MG tablet Take 1 tablet by mouth daily 30 tablet 5    aspirin (RA ASPIRIN EC ADULT LOW ST) 81 MG EC tablet Take 1 tablet by mouth daily 30 tablet 5    hydroCHLOROthiazide (HYDRODIURIL) 25 MG tablet Take 1 tablet by mouth every morning 90 tablet 1    clotrimazole-betamethasone (LOTRISONE) 1-0.05 % cream apply to affected area twice a day 45 g 1    Omega-3 Fatty Acids (FISH OIL) 1000 MG CAPS Take 500 mg by mouth daily       Multiple Vitamins-Minerals (ONE DAILY ADULTS 50+ PO) Take by mouth      ibuprofen (ADVIL;MOTRIN) 800 MG tablet Take 1 tablet by mouth every 8 hours as needed for Pain With food 90 tablet 5     No current facility-administered medications for this visit. Patient Care Team:  Supriya Knox MD as PCP - General (Internal Medicine)  Supriya Knox MD as PCP - [facility]  Beatriz Hsieh MD as Consulting Physician (Vascular Surgery)  Bharat Shields MD as Surgeon (General Surgery: Damian Blind)  Kalyani Cardenas MD as Consulting Physician (Infectious Diseases)  Gertrude Campos MD as Consulting Physician (Gastroenterology)  Beatriz Hsieh MD as Consulting Physician (Vascular Surgery)    PAST MEDICAL AND SURGICAL HISTORY:      Past Medical History:   Diagnosis Date    ADHD (attention deficit hyperactivity disorder)     Gastroesophageal reflux disease without esophagitis 2/16/2018    Hepatic vein thrombosis ([facility] [address] 6/28/2017    Hypertension     Osteoarthritis      Past Surgical History:   Procedure Laterality Date    ACHILLES TENDON SURGERY  2010    right foot    COLONOSCOPY  02/20/2018    The mucosal exam was normal. Mild pan-diverticulosis was noted.  A 13 mm Makenzie IIa lesion was noted in transverse colon. Lifting was achieved by submucosal injection of saline. The polyp was resected using hot snare. Two hemoclips were placed to close mucosal defect. A 4 mm polyp was noted in descending colon- removed by cold biopsy    COLONOSCOPY  12/17/2021    COLORECTAL CANCER SCREENING, NOT HIGH RISK     COLONOSCOPY N/A 12/17/2021    COLORECTAL CANCER SCREENING, NOT HIGH RISK performed by Arleen Dahl DO at [facility]  2/28/2022    COLONOSCOPY W/ ENDOSCOPIC MUCOSAL RESECTION performed by Jimy Kerns MD at [facility]    [place] COLONOSCOPY W/BIOPSY SINGLE/MULTIPLE N/A 2/20/2018    COLONOSCOPY WITH BIOPSY performed by David Guerrier MD at [place] [address] History     Tobacco Use    Smoking status: Current Every Day Smoker     Packs/day: 1.00     Years: 36.00     Pack years: 36.00     Types: Cigarettes     Start date: 11/30/1985    Smokeless tobacco: Never Used   Substance Use Topics    Alcohol use: Yes     Types: 1 - 2 Cans of beer per week     Comment: occassional     Rachel Mackay  reports that he has been smoking cigarettes. He started smoking about 36 years ago. He has a 36.00 pack-year smoking history. He has never used smokeless tobacco.    FAMILY HISTORY:      Family History   Problem Relation Age of Onset    Stroke Mother        REVIEW OF SYSTEMS:    Review of Systems   Constitutional: Negative for chills, fatigue and fever. HENT: Negative for congestion, postnasal drip and rhinorrhea. Respiratory: Negative for cough and shortness of breath. Cardiovascular: Negative for chest pain and leg swelling. Gastrointestinal: Negative for abdominal distention and abdominal pain. Endocrine: Negative for cold intolerance and heat intolerance. Genitourinary: Negative for flank pain and frequency. Musculoskeletal: Negative for arthralgias and back pain. Neurological: Negative for dizziness and headaches. Psychiatric/Behavioral: Negative for agitation and behavioral problems.        PHYSICAL EXAM:      Vitals:    04/26/22 1335   BP: (!) 153/75   Pulse: 73   Temp:    SpO2:      BP Readings from Last 3 Encounters:   04/26/22 (!) 153/75   03/29/22 (!) 183/90   02/28/22 (!) 143/75       Physical Examination: General appearance - alert, well appearing, and in no distress  Mental status - alert, oriented to person, place, and time  Chest - clear to auscultation, no wheezes, rales or rhonchi, symmetric air entry  Heart - normal rate and regular rhythm  Abdomen - bowel sounds normal  Back exam - full range of motion,  Neurological - alert, oriented, normal speech, no focal findings or movement disorder noted  Musculoskeletal - no joint tenderness, deformity or swelling  Extremities - no pedal edema noted    LABORATORY FINDINGS:    CBC:   Lab Results   Component Value Date    WBC 11.8 11/11/2021    HGB 17.1 11/11/2021     11/11/2021     BMP:    Lab Results   Component Value Date     11/11/2021    K 4.1 11/11/2021     11/11/2021    CO2 21 11/11/2021    BUN 11 11/11/2021    CREATININE 0.89 11/11/2021    GLUCOSE 114 11/11/2021    GLUCOSE 132 05/08/2012     Hemoglobin A1C:   Lab Results   Component Value Date    LABA1C 5.5 10/19/2021     Microalbumin Urine: No results found for: MICROALBUR  Lipid profile:   Lab Results   Component Value Date    CHOL 159 07/22/2016    TRIG 47 07/22/2016    HDL 47 10/04/2020     Thyroid functions:   Lab Results   Component Value Date    TSH 1.04 11/11/2021      Hepatic functions:   Lab Results   Component Value Date    ALT 15 11/11/2021    AST 16 11/11/2021    PROT 7.9 11/11/2021    BILITOT 0.51 11/11/2021    LABALBU 4.3 11/11/2021     Urine Analysis: No results found for: [address] Elan Klein:      Health Maintenance Due   Topic Date Due    COVID-19 Vaccine (1) Never done    Low dose CT lung screening  Never done    Pneumococcal 0-64 years Vaccine (2 - PCV) 08/15/2018       ASSESSMENT AND PLAN:       Diagnosis Orders   1. Essential (primary) hypertension   metoprolol tartrate (LOPRESSOR) 25 MG tablet    hydroCHLOROthiazide (HYDRODIURIL) 25 MG tablet   2. Erectile dysfunction, unspecified erectile dysfunction type  sildenafil (VIAGRA) 25 MG tablet   3. Primary osteoarthritis of both knees  gabapentin (NEURONTIN) 300 MG capsule   4. Chronic pain of right ankle  gabapentin (NEURONTIN) 300 MG capsule   5. Gastroesophageal reflux disease without esophagitis  omeprazole (PRILOSEC) 20 MG delayed release capsule   6. Sickle cell trait (HCC)  folic acid (FOLVITE) 1 MG tablet    thiamine 100 MG tablet    aspirin (RA ASPIRIN EC ADULT LOW ST) 81 MG EC tablet   7. Vitamin D deficiency  vitamin D3 (CHOLECALCIFEROL) 25 MCG (1000 UT) TABS tablet   add HCTZ. Continue metoprolol  rtc in 1 month. FOLLOW UP:   1. Wilma Lulis received counseling on the following healthy behaviors: nutrition and exercise    2. Reviewed prior labs and health maintenance. 3.  Discussed use, benefit, and side effects of prescribed medications. Barriers to medication compliance addressed. All patient questions answered. Pt voiced understanding. 4.  Continue current medications, diet and exercise. Orders Placed This Encounter   Medications    sildenafil (VIAGRA) 25 MG tablet     Sig: Take 1 tablet by mouth as needed for Erectile Dysfunction     Dispense:  10 tablet     Refill:  0    gabapentin (NEURONTIN) 300 MG capsule     Sig: Take 1 capsule by mouth 3 times daily for 30 days.      Dispense:  90 capsule     Refill:  5    omeprazole (PRILOSEC) 20 MG delayed release capsule     Sig: Take 1 capsule by mouth daily     Dispense:  30 capsule     Refill:  5    folic acid (FOLVITE) 1 MG tablet     Sig: Take 1 tablet by mouth daily     Dispense:  30 tablet     Refill:  5    vitamin D3 (CHOLECALCIFEROL) 25 MCG (1000 UT) TABS tablet     Sig: Take 1 tablet by mouth daily     Dispense:  30 tablet     Refill:  5    metoprolol tartrate (LOPRESSOR) 25 MG tablet     Sig: Take 1 tablet by mouth 2 times daily     Dispense:  60 tablet     Refill:  0    thiamine 100 MG tablet     Sig: Take 1 tablet by mouth daily     Dispense:  30 tablet     Refill:  5    aspirin (RA ASPIRIN EC ADULT LOW ST) 81 MG EC tablet     Sig: Take 1 tablet by mouth daily     Dispense:  30 tablet     Refill:  5    hydroCHLOROthiazide (HYDRODIURIL) 25 MG tablet     Sig: Take 1 tablet by mouth every morning     Dispense:  90 tablet     Refill:  1          Completed Refills               Requested Prescriptions     Signed Prescriptions Disp Refills    sildenafil (VIAGRA) 25 MG tablet 10 tablet 0     Sig: Take 1 tablet by mouth as needed for Erectile Dysfunction    gabapentin (NEURONTIN) 300 MG capsule 90 capsule 5     Sig: Take 1 capsule by mouth 3 times daily for 30 days.  omeprazole (PRILOSEC) 20 MG delayed release capsule 30 capsule 5     Sig: Take 1 capsule by mouth daily    folic acid (FOLVITE) 1 MG tablet 30 tablet 5     Sig: Take 1 tablet by mouth daily    vitamin D3 (CHOLECALCIFEROL) 25 MCG (1000 UT) TABS tablet 30 tablet 5     Sig: Take 1 tablet by mouth daily    metoprolol tartrate (LOPRESSOR) 25 MG tablet 60 tablet 0     Sig: Take 1 tablet by mouth 2 times daily    thiamine 100 MG tablet 30 tablet 5     Sig: Take 1 tablet by mouth daily    aspirin (RA ASPIRIN EC ADULT LOW ST) 81 MG EC tablet 30 tablet 5     Sig: Take 1 tablet by mouth daily    hydroCHLOROthiazide (HYDRODIURIL) 25 MG tablet 90 tablet 1     Sig: Take 1 tablet by mouth every morning       5. Patient given educational materials - see patient instructions    6. Was a self-tracking handout given in paper form or via [employer]? Yes  If yes, see orders or list here. No orders of the defined types were placed in this encounter. Return in about 4 weeks (around 5/24/2022) for High Blood Pressure.     Patient voiced understanding and agreed to treatment plan. This note is created with the assistance of a speech-recognition program. While intending to generate a document that actually reflects the content of the visit, the document can still have some mistakes which may not have been identified and corrected by editing.       Dr Denise Cruz MD, [address]  Associate , Department of Internal Medicine  Resident Ambulatory Site Medical Director  [address] [place] [address] Internal Medicine  [address] [place] [address],[address]  Internal Medicine Clerkship - Matilde Cosme                   4/26/2022, 3:43 PM

## 2022-04-26 NOTE — PATIENT INSTRUCTIONS
Patient left before getting discharge papers. Paperwork mailed to patient. Medications e-scribe to pharmacy of pt's choice. Patient to return to clinic 1 month (5/24/22). AVS reviewed and given to pt. It is very important for your care that you keep your appointment. If for some reason you are unable to keep your appointment it is equally important that you call our office at [phone] to cancel your appointment and reschedule. Failure to do so may result in your termination from our practice.   MB

## 2022-05-19 ENCOUNTER — OFFICE VISIT (OUTPATIENT)
Dept: GASTROENTEROLOGY | Age: 57
End: 2022-05-19
Payer: MEDICARE

## 2022-05-19 VITALS
HEART RATE: 75 BPM | HEIGHT: 72 IN | WEIGHT: 199.6 LBS | SYSTOLIC BLOOD PRESSURE: 138 MMHG | BODY MASS INDEX: 27.04 KG/M2 | DIASTOLIC BLOOD PRESSURE: 77 MMHG

## 2022-05-19 DIAGNOSIS — F10.20 ALCOHOLISM (HCC): ICD-10-CM

## 2022-05-19 DIAGNOSIS — R94.5 ABNORMAL RESULTS OF LIVER FUNCTION STUDIES: ICD-10-CM

## 2022-05-19 DIAGNOSIS — F17.200 SMOKER: ICD-10-CM

## 2022-05-19 DIAGNOSIS — I81 PORTAL VEIN THROMBOSIS: ICD-10-CM

## 2022-05-19 DIAGNOSIS — K63.5 POLYP OF COLON, UNSPECIFIED PART OF COLON, UNSPECIFIED TYPE: Primary | ICD-10-CM

## 2022-05-19 PROCEDURE — G8419 CALC BMI OUT NRM PARAM NOF/U: HCPCS | Performed by: INTERNAL MEDICINE

## 2022-05-19 PROCEDURE — 99214 OFFICE O/P EST MOD 30 MIN: CPT | Performed by: INTERNAL MEDICINE

## 2022-05-19 PROCEDURE — G8427 DOCREV CUR MEDS BY ELIG CLIN: HCPCS | Performed by: INTERNAL MEDICINE

## 2022-05-19 PROCEDURE — 4004F PT TOBACCO SCREEN RCVD TLK: CPT | Performed by: INTERNAL MEDICINE

## 2022-05-19 PROCEDURE — 3017F COLORECTAL CA SCREEN DOC REV: CPT | Performed by: INTERNAL MEDICINE

## 2022-05-19 ASSESSMENT — ENCOUNTER SYMPTOMS
DIARRHEA: 0
VOICE CHANGE: 0
GASTROINTESTINAL NEGATIVE: 1
ANAL BLEEDING: 0
BLOOD IN STOOL: 0
WHEEZING: 0
RECTAL PAIN: 0
CHOKING: 0
SHORTNESS OF BREATH: 0
CONSTIPATION: 0
COLOR CHANGE: 0
VOMITING: 0
ABDOMINAL PAIN: 0
TROUBLE SWALLOWING: 0
ABDOMINAL DISTENTION: 0
APNEA: 0
COUGH: 0
NAUSEA: 0
SORE THROAT: 0
RESPIRATORY NEGATIVE: 1

## 2022-05-19 NOTE — PROGRESS NOTES
GI FOLLOW UP    INTERVAL HISTORY:     Status post colonoscopy with EMR performed recently in February 2022  Need surveillance colonoscopy in 12 months      Chief Complaint   Patient presents with    Follow-up    Colonoscopy       1. Polyp of colon, unspecified part of colon, unspecified type    2. Portal vein thrombosis    3. Alcoholism ([address]    4. Smoker          HISTORY OF PRESENT ILLNESS: Vasile Mejía Sr. is a 49 y.o. male with a past history remarkable for HVT, referred for evaluation of endoscopic removal of advanced polyps. Patient is an active smoker and social drinker. Uses marijuana. Underwent a recent screening colonoscopy the patient was identified to have several polyps approximately 14 total.  Some were observed to be greater than 10 mm which are categorized into advance polyps. Patient referred to gastroenterology for endoscopic removal possible EMR of residual polyps.        Smoker: Active 1 ppd x 25 yrs   Drinking history: Socially   Illicit drugs: Marijuana. Abdominal surgeries:None   Prior Colonoscopy: 12/2021   Prior EGD: None   FH of GI issues: Kidney Ca- sister. Past Medical,Family, and Social History reviewed and does contribute to the patient presenting condition. Patient's PMH/PSH,SH,PSYCH Hx, MEDs, ALLERGIES, and ROS were all reviewed and updated in the appropriate sections. 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 Mild pan-diverticulosis was noted.  A 13 mm Makenzie IIa lesion was noted in transverse colon. Lifting was achieved by submucosal injection of saline. The polyp was resected using hot snare. Two hemoclips were placed to close mucosal defect. A 4 mm polyp was noted in descending colon- removed by cold biopsy    COLONOSCOPY  12/17/2021    COLORECTAL CANCER SCREENING, NOT HIGH RISK     COLONOSCOPY N/A 12/17/2021    COLORECTAL CANCER SCREENING, NOT HIGH RISK performed by Sarika Calvillo DO at [id number] COLONOSCOPY  2/28/2022    COLONOSCOPY W/ ENDOSCOPIC MUCOSAL RESECTION performed by Robin Singh MD at [facility]    [place] COLONOSCOPY W/BIOPSY SINGLE/MULTIPLE N/A 2/20/2018    COLONOSCOPY WITH BIOPSY performed by Karine Mcdaniel MD at [address] [place] [address] [address]:    Current Outpatient Medications:     sildenafil (VIAGRA) 25 MG tablet, Take 1 tablet by mouth as needed for Erectile Dysfunction, Disp: 10 tablet, Rfl: 0    gabapentin (NEURONTIN) 300 MG capsule, Take 1 capsule by mouth 3 times daily for 30 days. , Disp: 90 capsule, Rfl: 5    omeprazole (PRILOSEC) 20 MG delayed release capsule, Take 1 capsule by mouth daily, Disp: 30 capsule, Rfl: 5    folic acid (FOLVITE) 1 MG tablet, Take 1 tablet by mouth daily, Disp: 30 tablet, Rfl: 5    vitamin D3 (CHOLECALCIFEROL) 25 MCG (1000 UT) TABS tablet, Take 1 tablet by mouth daily, Disp: 30 tablet, Rfl: 5    metoprolol tartrate (LOPRESSOR) 25 MG tablet, Take 1 tablet by mouth 2 times daily, Disp: 60 tablet, Rfl: 0    thiamine 100 MG tablet, Take 1 tablet by mouth daily, Disp: 30 tablet, Rfl: 5    aspirin (RA ASPIRIN EC ADULT LOW ST) 81 MG EC tablet, Take 1 tablet by mouth daily, Disp: 30 tablet, Rfl: 5    hydroCHLOROthiazide (HYDRODIURIL) 25 MG tablet, Take 1 tablet by mouth every morning, Disp: 90 tablet, Rfl: 1    clotrimazole-betamethasone (LOTRISONE) 1-0.05 % cream, apply to affected area twice a day, Disp: 45 g, Rfl: 1    Omega-3 Fatty Acids (FISH OIL) 1000 MG CAPS, Take 500 mg by mouth daily , Disp: , Rfl:     Multiple Vitamins-Minerals (ONE DAILY ADULTS 50+ PO), Take by mouth, Disp: , Rfl:     ibuprofen (ADVIL;MOTRIN) 800 MG tablet, Take 1 tablet by mouth every 8 hours as needed for Pain With food, Disp: 90 tablet, Rfl: 5    ALLERGIES:   No Known Allergies    FAMILY HISTORY:       Problem Relation Age of Onset    Stroke Mother          SOCIAL HISTORY:   Social History     Socioeconomic History    Marital status:      Spouse name: Not on file    Number of children: Not on file    Years of education: Not on file    Highest education level: Not on file   Occupational History    Not on file   Tobacco Use    Smoking status: Current Every Day Smoker     Packs/day: 1.00     Years: 36.00     Pack years: 36.00     Types: Cigarettes     Start date: 11/30/1985    Smokeless tobacco: Never Used   Vaping Use    Vaping Use: Never used   Substance and Sexual Activity    Alcohol use: Yes     Types: 1 - 2 Cans of beer per week     Comment: occassional    Drug use: Yes     Frequency: 2.0 times per week     Types: Marijuana [employer])    Sexual activity: Yes     Partners: Female   Other Topics Concern    Not on file   Social History Narrative    Not on file     Social Determinants of Health     Financial Resource Strain: Low Risk     Difficulty of Paying Living Expenses: Not hard at all   Food Insecurity: No Food Insecurity    Worried About Running Out of Food in the Last Year: Never true    Kevan of Food in the Last Year: Never true   Transportation Needs: No Transportation Needs    Lack of Transportation (Medical): No    Lack of Transportation (Non-Medical):  No   Physical Activity:     Days of Exercise per Week: Not on file    Minutes of Exercise per Session: Not on file   Stress:     Feeling of Stress : Not on file   Social Connections:     Frequency of Communication with Friends and Family: Not on file    Frequency of Social Gatherings with Friends and Family: Not on file    Attends Judaism Services: Not on file    Active Member of Clubs or Organizations: Not on file    Attends Club or Organization Meetings: Not on file    Marital Status: Not on file   Intimate Partner Violence:     Fear of Current or Ex-Partner: Not on file    Emotionally Abused: Not on file    Physically Abused: Not on file    Sexually Abused: Not on file   Housing Stability:     Unable to Pay for Housing in the Last Year: Not on file    Number of [place] in the Last Year: Not on file    Unstable Housing in the Last Year: Not on file       REVIEW OF SYSTEMS: A 12-point review of systems was obtained and pertinent positives and negatives were listed below. REVIEW OF SYSTEMS:     Constitutional: No fever, no chills, no lethargy, no weakness. HEENT:  No headache, otalgia, itchy eyes, nasal discharge or sore throat. Cardiac:  No chest pain, dyspnea, orthopnea or PND. Chest:   No cough, phlegm or wheezing. Abdomen:      Detailed by MA   Neuro:  No focal weakness, abnormal movements or seizure like activity. Skin:   No rashes, no itching. :   No hematuria, no pyuria, no dysuria, no flank pain. Extremities:  No swelling or joint pains. ROS was otherwise negative    Review of Systems   Constitutional: Negative. Negative for appetite change, fatigue, fever and unexpected weight change. HENT: Negative. Negative for sore throat, trouble swallowing and voice change. Eyes: Negative for visual disturbance. Respiratory: Negative. Negative for apnea, cough, choking, shortness of breath and wheezing. Cardiovascular: Negative. Negative for chest pain, palpitations and leg swelling. Gastrointestinal: Negative. Negative for abdominal distention, abdominal pain, anal bleeding, blood in stool, constipation, diarrhea, nausea, rectal pain and vomiting. Genitourinary: Negative. Negative for difficulty urinating. Skin: Negative. Negative for color change and rash. Neurological: Negative.   Negative for dizziness, seizures, weakness, light-headedness, numbness and headaches. Hematological: Negative. Does not bruise/bleed easily. Psychiatric/Behavioral: Negative. Negative for confusion and sleep disturbance. The patient is not nervous/anxious. PHYSICAL EXAMINATION: Vital signs reviewed per the nursing documentation. /77   Pulse 75   Ht 5' 11.5\" (1.816 m)   Wt 199 lb 9.6 oz (90.5 kg)   BMI 27.45 kg/m²   Body mass index is 27.45 kg/m². Physical Exam    Physical Exam   Constitutional: Patient is oriented to person, place, and time. Patient appears well-developed and well-nourished. HENT:   Head: Normocephalic and atraumatic. Eyes: Pupils are equal, round, and reactive to light. EOM are normal.   Neck: Normal range of motion. Neck supple. No JVD present. No tracheal deviation present. No thyromegaly present. Cardiovascular: Normal rate, regular rhythm, normal heart sounds and intact distal pulses. Pulmonary/Chest: Effort normal and breath sounds normal. No stridor. No respiratory distress. He has no wheezes. He has no rales. He exhibits no tenderness. Abdominal: Soft. Bowel sounds are normal. He exhibits no distension and no mass. There is no tenderness. There is no rebound and no guarding. No hernia. Musculoskeletal: Normal range of motion. Lymphadenopathy:    Patient has no cervical adenopathy. Neurological: Patient is alert and oriented to person, place, and time. Psychiatric: Patient has a normal mood and affect.  Patient behavior is normal.       LABORATORY DATA: Reviewed  Lab Results   Component Value Date    WBC 11.8 (H) 11/11/2021    HGB 17.1 (H) 11/11/2021    HCT 51.5 (H) 11/11/2021    MCV 85.4 11/11/2021     11/11/2021     11/11/2021    K 4.1 11/11/2021     11/11/2021    CO2 21 11/11/2021    BUN 11 11/11/2021    CREATININE 0.89 11/11/2021    LABALBU 4.3 11/11/2021    BILITOT 0.51 11/11/2021    ALKPHOS 86 11/11/2021    AST 16 11/11/2021    ALT 15 11/11/2021    INR 2.49 (H) 10/13/2017         Lab Results   Component Value Date    RBC 6.03 (H) 11/11/2021    HGB 17.1 (H) 11/11/2021    MCV 85.4 11/11/2021    MCH 28.4 11/11/2021    MCHC 33.2 11/11/2021    RDW 13.2 11/11/2021    MPV 10.0 11/11/2021    BASOPCT 1 11/11/2021    LYMPHSABS 2.06 11/11/2021    MONOSABS 0.61 11/11/2021    NEUTROABS 8.66 (H) 11/11/2021    EOSABS 0.36 11/11/2021    BASOSABS 0.07 11/11/2021         DIAGNOSTIC TESTING:     No results found. IMPRESSION: Krishna Caban Sr. is a 49 y.o. male with a past history remarkable for HVT, referred for evaluation of endoscopic removal of advanced polyps. Patient is an active smoker and social drinker. Uses marijuana. Underwent a recent screening colonoscopy the patient was identified to have several polyps approximately 14 total.  Some were observed to be greater than 10 mm which are categorized into advance polyps. Patient referred to gastroenterology for endoscopic removal possible EMR of residual polyps. Assessment  1. Polyp of colon, unspecified part of colon, unspecified type    2. Portal vein thrombosis    3. Alcoholism ([address]    4. Smoker        Courtney Castellanos was seen today for follow-up and colonoscopy. Diagnoses and all orders for this visit:    Polyp of colon, unspecified part of colon, unspecified type-repeat colonoscopy in February 2023 for surveillance reasons. The patient underwent an EMR. Tubular adenomas identified on histopathology. Results discussed with patient.  -     Hepatic Function Panel; Future  -     CBC with Auto Differential; Future  -     Protime-INR; Future  -     Basic Metabolic Panel; Future    Portal vein thrombosis-previous reported history of portal vein thrombosis, no no recent imaging to evaluate patency or hepatofugal flow. Will obtain abdominal ultrasound to further evaluate. -     US ABDOMEN LIMITED; Future  -     Hepatic Function Panel;  Future  -     CBC with Auto Differential; Future  - Protime-INR; Future  -     Basic Metabolic Panel; Future    Alcoholism (HCC)-strongly recommend alcohol cessation, patient denies any active alcohol use  -     Hepatic Function Panel; Future  -     CBC with Auto Differential; Future  -     Protime-INR; Future  -     Basic Metabolic Panel; Future    Smoker-strongly recommend smoking cessation, patient agreed to attempt to taper off.  -     Hepatic Function Panel; Future  -     CBC with Auto Differential; Future  -     Protime-INR; Future  -     Basic Metabolic Panel; Future           RTC: 3 to 4 months. Additional comments: Thank you for allowing me to participate in the care of Mr. Get Dawn. For any further questions please do not hesitate to contact me. I have reviewed and agree with the ROS entered by the MA/LPN from today's encounter documented in a separate note. Nate Sepulveda MD, MPH   Board Certified in Gastroenterology  Board Certified in [address] [place] [address] #: [phone]    this note is created with the assistance of a speech recognition program.  While intending to generate a document that actually reflects the content of the visit, the document can still have some errors including those of syntax and sound a like substitutions which may escape proof reading. It such instances, actual meaning can be extrapolated by contextual diversion.

## 2022-05-20 ENCOUNTER — TELEPHONE (OUTPATIENT)
Dept: INTERNAL MEDICINE | Age: 57
End: 2022-05-20

## 2022-05-23 ENCOUNTER — HOSPITAL ENCOUNTER (OUTPATIENT)
Age: 57
Setting detail: SPECIMEN
Discharge: HOME | End: 2022-05-23

## 2022-05-23 DIAGNOSIS — I81 PORTAL VEIN THROMBOSIS: ICD-10-CM

## 2022-05-23 DIAGNOSIS — K63.5 POLYP OF COLON, UNSPECIFIED PART OF COLON, UNSPECIFIED TYPE: ICD-10-CM

## 2022-05-23 DIAGNOSIS — F10.20 ALCOHOLISM (HCC): ICD-10-CM

## 2022-05-23 DIAGNOSIS — R94.5 ABNORMAL RESULTS OF LIVER FUNCTION STUDIES: ICD-10-CM

## 2022-05-23 DIAGNOSIS — F17.200 SMOKER: ICD-10-CM

## 2022-05-23 LAB
ABSOLUTE EOS #: 0.3 K/UL (ref 0–0.44)
ABSOLUTE IMMATURE GRANULOCYTE: 0.06 K/UL (ref 0–0.3)
ABSOLUTE LYMPH #: 1.6 K/UL (ref 1.1–3.7)
ABSOLUTE MONO #: 0.85 K/UL (ref 0.1–1.2)
ALBUMIN SERPL-MCNC: 4.5 G/DL (ref 3.5–5.2)
ALBUMIN/GLOBULIN RATIO: 1.3 (ref 1–2.5)
ALP BLD-CCNC: 100 U/L (ref 40–129)
ALT SERPL-CCNC: 23 U/L (ref 5–41)
ANION GAP SERPL CALCULATED.3IONS-SCNC: 12 MMOL/L (ref 9–17)
AST SERPL-CCNC: 21 U/L
BASOPHILS # BLD: 1 % (ref 0–2)
BASOPHILS ABSOLUTE: 0.09 K/UL (ref 0–0.2)
BILIRUB SERPL-MCNC: 0.4 MG/DL (ref 0.3–1.2)
BILIRUBIN DIRECT: 0.09 MG/DL
BILIRUBIN, INDIRECT: 0.31 MG/DL (ref 0–1)
BUN BLDV-MCNC: 17 MG/DL (ref 6–20)
CALCIUM SERPL-MCNC: 9.5 MG/DL (ref 8.6–10.4)
CHLORIDE BLD-SCNC: 99 MMOL/L (ref 98–107)
CO2: 23 MMOL/L (ref 20–31)
CREAT SERPL-MCNC: 1.01 MG/DL (ref 0.7–1.2)
EOSINOPHILS RELATIVE PERCENT: 3 % (ref 1–4)
GFR AFRICAN AMERICAN: >60 ML/MIN
GFR NON-AFRICAN AMERICAN: >60 ML/MIN
GFR SERPL CREATININE-BSD FRML MDRD: ABNORMAL ML/MIN/{1.73_M2}
GLUCOSE BLD-MCNC: 90 MG/DL (ref 70–99)
HCT VFR BLD CALC: 52.2 % (ref 40.7–50.3)
HEMOGLOBIN: 18 G/DL (ref 13–17)
IMMATURE GRANULOCYTES: 1 %
INR BLD: 0.9
LYMPHOCYTES # BLD: 17 % (ref 24–43)
MCH RBC QN AUTO: 29.1 PG (ref 25.2–33.5)
MCHC RBC AUTO-ENTMCNC: 34.5 G/DL (ref 28.4–34.8)
MCV RBC AUTO: 84.3 FL (ref 82.6–102.9)
MONOCYTES # BLD: 9 % (ref 3–12)
NRBC AUTOMATED: 0 PER 100 WBC
PDW BLD-RTO: 12.9 % (ref 11.8–14.4)
PLATELET # BLD: 255 K/UL (ref 138–453)
PMV BLD AUTO: 9.8 FL (ref 8.1–13.5)
POTASSIUM SERPL-SCNC: 3.7 MMOL/L (ref 3.7–5.3)
PROTHROMBIN TIME: 10.2 SEC (ref 9.1–12.3)
RBC # BLD: 6.19 M/UL (ref 4.21–5.77)
SEG NEUTROPHILS: 69 % (ref 36–65)
SEGMENTED NEUTROPHILS ABSOLUTE COUNT: 6.54 K/UL (ref 1.5–8.1)
SODIUM BLD-SCNC: 134 MMOL/L (ref 135–144)
TOTAL PROTEIN: 7.9 G/DL (ref 6.4–8.3)
WBC # BLD: 9.4 K/UL (ref 3.5–11.3)

## 2022-05-24 ENCOUNTER — OFFICE VISIT (OUTPATIENT)
Dept: INTERNAL MEDICINE | Age: 57
End: 2022-05-24
Payer: MEDICARE

## 2022-05-24 ENCOUNTER — HOSPITAL ENCOUNTER (OUTPATIENT)
Dept: ULTRASOUND IMAGING | Age: 57
Discharge: HOME | End: 2022-05-26
Payer: MEDICARE

## 2022-05-24 VITALS
HEIGHT: 72 IN | SYSTOLIC BLOOD PRESSURE: 141 MMHG | DIASTOLIC BLOOD PRESSURE: 79 MMHG | BODY MASS INDEX: 25.98 KG/M2 | HEART RATE: 83 BPM | WEIGHT: 191.8 LBS

## 2022-05-24 DIAGNOSIS — I10 ESSENTIAL HYPERTENSION: Primary | ICD-10-CM

## 2022-05-24 DIAGNOSIS — I81 PORTAL VEIN THROMBOSIS: ICD-10-CM

## 2022-05-24 LAB
HAV IGM SER IA-ACNC: NONREACTIVE
HEPATITIS B CORE IGM ANTIBODY: NONREACTIVE
HEPATITIS B SURFACE ANTIGEN: NONREACTIVE
HEPATITIS C ANTIBODY: NONREACTIVE

## 2022-05-24 PROCEDURE — 3017F COLORECTAL CA SCREEN DOC REV: CPT | Performed by: INTERNAL MEDICINE

## 2022-05-24 PROCEDURE — G8427 DOCREV CUR MEDS BY ELIG CLIN: HCPCS | Performed by: INTERNAL MEDICINE

## 2022-05-24 PROCEDURE — 99213 OFFICE O/P EST LOW 20 MIN: CPT | Performed by: INTERNAL MEDICINE

## 2022-05-24 PROCEDURE — 4004F PT TOBACCO SCREEN RCVD TLK: CPT | Performed by: INTERNAL MEDICINE

## 2022-05-24 PROCEDURE — 93976 VASCULAR STUDY: CPT

## 2022-05-24 PROCEDURE — G8419 CALC BMI OUT NRM PARAM NOF/U: HCPCS | Performed by: INTERNAL MEDICINE

## 2022-05-24 PROCEDURE — 76705 ECHO EXAM OF ABDOMEN: CPT

## 2022-05-24 RX ORDER — LOSARTAN POTASSIUM AND HYDROCHLOROTHIAZIDE 12.5; 5 MG/1; MG/1
1 TABLET ORAL DAILY
Qty: 90 TABLET | Refills: 1 | Status: SHIPPED | OUTPATIENT
Start: 2022-05-24

## 2022-05-24 ASSESSMENT — ENCOUNTER SYMPTOMS
RESPIRATORY NEGATIVE: 1
GASTROINTESTINAL NEGATIVE: 1
EYES NEGATIVE: 1
ALLERGIC/IMMUNOLOGIC NEGATIVE: 1

## 2022-05-24 NOTE — PROGRESS NOTES
[facility]   Progress Note      Date of patient's visit: 5/24/2022  Patient's Name:  Milvia Golden Sr. YOB: 1965        PCP:  Carroll Calvillo MD    Milvia Golden Sr. is a 49 y.o. male who presents for   Chief Complaint   Patient presents with    Hypertension     1 month follow mup    and follow up of chronic medical problems. Patient Active Problem List   Diagnosis    Essential hypertension    Sickle cell trait ([address]    Primary osteoarthritis of knee    Portal vein thrombosis, june 2017, completed course of warfarin     Gastroesophageal reflux disease without esophagitis    Chronic pain of right ankle    Diverticulosis    Polyp of sigmoid colon    Adenomatous polyp of descending colon    Rectal polyp       HISTORY OF PRESENT ILLNESS:    History was obtained from the patient. Hypertension  Patient is here for follow-up of elevated blood pressure. He is not exercising and is adherent to a low-salt diet. Blood pressure is not well controlled at home. Cardiac symptoms: none. Patient denies chest pressure/discomfort and claudication. Cardiovascular risk factors: hypertension and male gender. Use of agents associated with hypertension: none. History of target organ damage: none. Patient's allergies, medications, past medical, surgical, social and family histories were reviewed and updated as appropriate. ALLERGIES    No Known Allergies      MEDICATIONS:      Current Outpatient Medications   Medication Sig Dispense Refill    sildenafil (VIAGRA) 25 MG tablet Take 1 tablet by mouth as needed for Erectile Dysfunction 10 tablet 0    gabapentin (NEURONTIN) 300 MG capsule Take 1 capsule by mouth 3 times daily for 30 days.  90 capsule 5    omeprazole (PRILOSEC) 20 MG delayed release capsule Take 1 capsule by mouth daily 30 capsule 5    folic acid (FOLVITE) 1 MG tablet Take 1 tablet by mouth daily 30 tablet 5    vitamin D3 (CHOLECALCIFEROL) 25 MCG (1000 UT) TABS tablet Take 1 tablet by mouth daily 30 tablet 5    metoprolol tartrate (LOPRESSOR) 25 MG tablet Take 1 tablet by mouth 2 times daily 60 tablet 0    thiamine 100 MG tablet Take 1 tablet by mouth daily 30 tablet 5    aspirin (RA ASPIRIN EC ADULT LOW ST) 81 MG EC tablet Take 1 tablet by mouth daily 30 tablet 5    hydroCHLOROthiazide (HYDRODIURIL) 25 MG tablet Take 1 tablet by mouth every morning 90 tablet 1    clotrimazole-betamethasone (LOTRISONE) 1-0.05 % cream apply to affected area twice a day 45 g 1    Omega-3 Fatty Acids (FISH OIL) 1000 MG CAPS Take 500 mg by mouth daily       Multiple Vitamins-Minerals (ONE DAILY ADULTS 50+ PO) Take by mouth      ibuprofen (ADVIL;MOTRIN) 800 MG tablet Take 1 tablet by mouth every 8 hours as needed for Pain With food 90 tablet 5     No current facility-administered medications for this visit. Patient Care Team:  Yvonne Huynh MD as PCP - General (Internal Medicine)  Yvonne Huynh MD as PCP - [facility] Provider  Leanna Bennett MD as Consulting Physician (Vascular Surgery)  Robles Nance MD as Surgeon (General Surgery: Nikolai Munoz)  Britney Florez MD as Consulting Physician (Infectious Diseases)  Heloise Peabody, MD as Consulting Physician (Gastroenterology)  Leanna Bennett MD as Consulting Physician (Vascular Surgery)    PAST MEDICAL AND SURGICAL HISTORY:      Past Medical History:   Diagnosis Date    ADHD (attention deficit hyperactivity disorder)     Gastroesophageal reflux disease without esophagitis 2/16/2018    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 Mild pan-diverticulosis was noted. A 13 mm Makenzie IIa lesion was noted in transverse colon. Lifting was achieved by submucosal injection of saline. The polyp was resected using hot snare. Two hemoclips were placed to close mucosal defect. A 4 mm polyp was noted in descending colon- removed by cold biopsy    COLONOSCOPY  12/17/2021    COLORECTAL CANCER SCREENING, NOT HIGH RISK     COLONOSCOPY N/A 12/17/2021    COLORECTAL CANCER SCREENING, NOT HIGH RISK performed by Wen Morris DO at [facility]  2/28/2022    COLONOSCOPY W/ ENDOSCOPIC MUCOSAL RESECTION performed by Duarte Diaz MD at [facility]    [place] COLONOSCOPY W/BIOPSY SINGLE/MULTIPLE N/A 2/20/2018    COLONOSCOPY WITH BIOPSY performed by Lisa Biswas MD at [place] [address] History     Tobacco Use    Smoking status: Current Every Day Smoker     Packs/day: 1.00     Years: 36.00     Pack years: 36.00     Types: Cigarettes     Start date: 11/30/1985    Smokeless tobacco: Never Used   Substance Use Topics    Alcohol use: Yes     Types: 1 - 2 Cans of beer per week     Comment: occassional     Malik Duvall  reports that he has been smoking cigarettes. He started smoking about 36 years ago. He has a 36.00 pack-year smoking history. He has never used smokeless tobacco.    FAMILY HISTORY:      Family History   Problem Relation Age of Onset    Stroke Mother        REVIEW OF SYSTEMS:    Review of Systems   Constitutional: Negative. HENT: Negative. Eyes: Negative. Respiratory: Negative. Cardiovascular: Negative. Gastrointestinal: Negative. Endocrine: Negative. Genitourinary: Negative. Musculoskeletal: Negative. Skin: Negative. Allergic/Immunologic: Negative. Neurological: Negative. Hematological: Negative. Psychiatric/Behavioral: Negative.           PHYSICAL EXAM:      Vitals:    05/24/22 1509   BP: (!) 141/79   Pulse: 83     BP Readings from Last 3 Encounters:   05/24/22 (!) 141/79   05/19/22 138/77   04/26/22 (!) 153/75       Physical Examination: General appearance - alert, well appearing, and in no distress  Mental status - alert, oriented to person, place, and time  Chest - clear to auscultation, no wheezes, rales or rhonchi, symmetric air entry  Heart - normal rate and regular rhythm  Back exam - full range of motion, no tenderness, palpable spasm or pain on motion  Neurological - alert, oriented, normal speech, no focal findings or movement disorder noted  Musculoskeletal - no joint tenderness, deformity or swelling    LABORATORY FINDINGS:    CBC:   Lab Results   Component Value Date    WBC 9.4 05/23/2022    HGB 18.0 05/23/2022     05/23/2022     BMP:    Lab Results   Component Value Date     05/23/2022    K 3.7 05/23/2022    CL 99 05/23/2022    CO2 23 05/23/2022    BUN 17 05/23/2022    CREATININE 1.01 05/23/2022    GLUCOSE 90 05/23/2022    GLUCOSE 132 05/08/2012     Hemoglobin A1C:   Lab Results   Component Value Date    LABA1C 5.5 10/19/2021     Microalbumin Urine: No results found for: MICROALBUR  Lipid profile:   Lab Results   Component Value Date    CHOL 159 07/22/2016    TRIG 47 07/22/2016    HDL 47 10/04/2020     Thyroid functions:   Lab Results   Component Value Date    TSH 1.04 11/11/2021      Hepatic functions:   Lab Results   Component Value Date    ALT 23 05/23/2022    AST 21 05/23/2022    PROT 7.9 05/23/2022    BILITOT 0.40 05/23/2022    BILIDIR 0.09 05/23/2022    LABALBU 4.5 05/23/2022     Urine Analysis: No results found for: [address] [place]:      Health Maintenance Due   Topic Date Due    COVID-19 Vaccine (1) Never done    Low dose CT lung screening  Never done    Pneumococcal 0-64 years Vaccine (2 - PCV) 08/15/2018       ASSESSMENT AND PLAN:    1. Essential hypertension  Hold losartan. Continue metoprolol. We will start patient on Hyzaar.  - losartan-hydroCHLOROthiazide (HYZAAR) 50-12.5 MG per tablet; Take 1 tablet by mouth daily  Dispense: 90 tablet; Refill: 1      FOLLOW UP:   1. Kyle Contreras received counseling on the following healthy behaviors: nutrition and exercise    2. Reviewed prior labs and health maintenance. 3.  Discussed use, benefit, and side effects of prescribed medications. Barriers to medication compliance addressed. All patient questions answered. Pt voiced understanding. 4.  Continue current medications, diet and exercise. Orders Placed This Encounter   Medications    losartan-hydroCHLOROthiazide (HYZAAR) 50-12.5 MG per tablet     Sig: Take 1 tablet by mouth daily     Dispense:  90 tablet     Refill:  1          Completed Refills               Requested Prescriptions     Signed Prescriptions Disp Refills    losartan-hydroCHLOROthiazide (HYZAAR) 50-12.5 MG per tablet 90 tablet 1     Sig: Take 1 tablet by mouth daily       5. Patient given educational materials - see patient instructions    6. Was a self-tracking handout given in paper form or via [employer]? Yes  If yes, see orders or list here. No orders of the defined types were placed in this encounter. Return in about 2 weeks (around 6/7/2022) for High Blood Pressure. Patient voiced understanding and agreed to treatment plan. This note is created with the assistance of a speech-recognition program. While intending to generate a document that actually reflects the content of the visit, the document can still have some mistakes which may not have been identified and corrected by editing.       Dr Joey Robles MD, [facility]  [facility]ate , Department of Internal Medicine  Resident Ambulatory Site Medical Director  [facility] Internal Medicine  [employer]  Internal Medicine Clerkship - [facility]                   5/26/2022, 9:16 AM

## 2022-06-07 ENCOUNTER — OFFICE VISIT (OUTPATIENT)
Dept: INTERNAL MEDICINE | Age: 57
End: 2022-06-07
Payer: MEDICARE

## 2022-06-07 VITALS
DIASTOLIC BLOOD PRESSURE: 82 MMHG | SYSTOLIC BLOOD PRESSURE: 171 MMHG | HEART RATE: 83 BPM | WEIGHT: 193.2 LBS | HEIGHT: 72 IN | BODY MASS INDEX: 26.17 KG/M2

## 2022-06-07 DIAGNOSIS — I10 ESSENTIAL HYPERTENSION: Primary | ICD-10-CM

## 2022-06-07 PROCEDURE — G8419 CALC BMI OUT NRM PARAM NOF/U: HCPCS | Performed by: INTERNAL MEDICINE

## 2022-06-07 PROCEDURE — G8427 DOCREV CUR MEDS BY ELIG CLIN: HCPCS | Performed by: INTERNAL MEDICINE

## 2022-06-07 PROCEDURE — 3017F COLORECTAL CA SCREEN DOC REV: CPT | Performed by: INTERNAL MEDICINE

## 2022-06-07 PROCEDURE — 4004F PT TOBACCO SCREEN RCVD TLK: CPT | Performed by: INTERNAL MEDICINE

## 2022-06-07 PROCEDURE — 99213 OFFICE O/P EST LOW 20 MIN: CPT | Performed by: INTERNAL MEDICINE

## 2022-06-07 NOTE — PROGRESS NOTES
[facility]   Progress Note        Date of patient's visit: 6/7/2022  Patient's Name:  Noelle Crespo Sr. YOB: 1965        PCP:  Nehemiah Wong MD    Noelle Crespo Sr. is a 49 y.o. male who presents for   Chief Complaint   Patient presents with    Hypertension     2 week follow up    and follow up of chronic medical problems. Patient Active Problem List   Diagnosis    Essential hypertension    Sick[facility] trait ([facility] [address]    Primary osteoarthritis of knee    Portal vein thrombosis, june 2017, completed course of warfarin     Gastroesophageal reflux disease without esophagitis    Chronic pain of right ankle    Diverticulosis    Polyp of sigmoid colon    Adenomatous polyp of descending colon    Rectal polyp       HISTORY OF PRESENT ILLNESS:    History was obtained from the patient. Hypertension  Patient is here for follow-up of elevated blood pressure. He is not exercising and is adherent to a low-salt diet. Blood pressure is not well controlled at home. Cardiac symptoms: none. Patient denies chest pain and dyspnea. Cardiovascular risk factors: hypertension and male gender. Use of agents associated with hypertension: none. History of target organ damage: none. Patient's allergies, medications, past medical, surgical, social and family histories were reviewed and updated as appropriate. ALLERGIES    No Known Allergies      MEDICATIONS:      Current Outpatient Medications   Medication Sig Dispense Refill    sildenafil (VIAGRA) 25 MG tablet Take 1 tablet by mouth as needed for Erectile Dysfunction 10 tablet 0    gabapentin (NEURONTIN) 300 MG capsule Take 1 capsule by mouth 3 times daily for 30 days.  90 capsule 5    omeprazole (PRILOSEC) 20 MG delayed release capsule Take 1 capsule by mouth daily 30 capsule 5    folic acid (FOLVITE) 1 MG tablet Take 1 tablet by mouth daily 30 tablet 5    vitamin D3 (CHOLECALCIFEROL) 25 MCG (1000 UT) TABS tablet Take 1 tablet by mouth daily 30 tablet 5    metoprolol tartrate (LOPRESSOR) 25 MG tablet Take 1 tablet by mouth 2 times daily 60 tablet 0    thiamine 100 MG tablet Take 1 tablet by mouth daily 30 tablet 5    aspirin (RA ASPIRIN EC ADULT LOW ST) 81 MG EC tablet Take 1 tablet by mouth daily 30 tablet 5    hydroCHLOROthiazide (HYDRODIURIL) 25 MG tablet Take 1 tablet by mouth every morning 90 tablet 1    clotrimazole-betamethasone (LOTRISONE) 1-0.05 % cream apply to affected area twice a day 45 g 1    Omega-3 Fatty Acids (FISH OIL) 1000 MG CAPS Take 500 mg by mouth daily       Multiple Vitamins-Minerals (ONE DAILY ADULTS 50+ PO) Take by mouth      ibuprofen (ADVIL;MOTRIN) 800 MG tablet Take 1 tablet by mouth every 8 hours as needed for Pain With food 90 tablet 5    losartan-hydroCHLOROthiazide (HYZAAR) 50-12.5 MG per tablet Take 1 tablet by mouth daily (Patient not taking: Reported on 6/7/2022) 90 tablet 1     No current facility-administered medications for this visit. Patient Care Team:  Laurie Moss MD as PCP - General (Internal Medicine)  Laurie Moss MD as PCP - [facility] Provider  Ariana Noble MD as Consulting Physician (Vascular Surgery)  Vinicio Conrad MD as Surgeon (General Surgery: Desmond Bae)  Gina Cesar MD as Consulting Physician (Infectious Diseases)  Neelam Park MD as Consulting Physician (Gastroenterology)  Ariana Noble MD as Consulting Physician (Vascular Surgery)    PAST MEDICAL AND SURGICAL HISTORY:      Past Medical History:   Diagnosis Date    ADHD (attention deficit hyperactivity disorder)     Gastroesophageal reflux disease without esophagitis 2/16/2018    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 Mild pan-diverticulosis was noted.  A 13 mm Makenzie IIa lesion was noted in transverse colon. Lifting was achieved by submucosal injection of saline. The polyp was resected using hot snare. Two hemoclips were placed to close mucosal defect. A 4 mm polyp was noted in descending colon- removed by cold biopsy    COLONOSCOPY  12/17/2021    COLORECTAL CANCER SCREENING, NOT HIGH RISK     COLONOSCOPY N/A 12/17/2021    COLORECTAL CANCER SCREENING, NOT HIGH RISK performed by Rashel Evans DO at [place] [address]  2/28/2022    COLONOSCOPY W/ ENDOSCOPIC MUCOSAL RESECTION performed by Tamiko Fry MD at [facility]    MS COLONOSCOPY W/BIOPSY SINGLE/MULTIPLE N/A 2/20/2018    COLONOSCOPY WITH BIOPSY performed by Rudy Vásquez MD at [place] [address] History     Tobacco Use    Smoking status: Current Every Day Smoker     Packs/day: 1.00     Years: 36.00     Pack years: 36.00     Types: Cigarettes     Start date: 11/30/1985    Smokeless tobacco: Never Used   Substance Use Topics    Alcohol use: Yes     Types: 1 - 2 Cans of beer per week     Comment: occassional     Bethany Noel  reports that he has been smoking cigarettes. He started smoking about 36 years ago. He has a 36.00 pack-year smoking history.  He has never used smokeless tobacco.    FAMILY HISTORY:      Family History   Problem Relation Age of Onset    Stroke Mother        REVIEW OF SYSTEMS:    Review of Systems          PHYSICAL EXAM:      Vitals:    06/07/22 1442   BP: (!) 171/82   Pulse: 83     BP Readings from Last 3 Encounters:   06/07/22 (!) 171/82   05/24/22 (!) 141/79   05/19/22 138/77       Physical Examination: General appearance - alert, well appearing, and in no distress  Mental status - alert, oriented to person, place, and time  Chest - clear to auscultation, no wheezes, rales or rhonchi, symmetric air entry  Heart - normal rate and regular rhythm  Abdomen - bowel sounds normal    LABORATORY FINDINGS:    CBC:   Lab Results   Component Value Date    WBC 9.4 05/23/2022 HGB 18.0 05/23/2022     05/23/2022     BMP:    Lab Results   Component Value Date     05/23/2022    K 3.7 05/23/2022    CL 99 05/23/2022    CO2 23 05/23/2022    BUN 17 05/23/2022    CREATININE 1.01 05/23/2022    GLUCOSE 90 05/23/2022    GLUCOSE 132 05/08/2012     Hemoglobin A1C:   Lab Results   Component Value Date    LABA1C 5.5 10/19/2021     Microalbumin Urine: No results found for: Richard Quintero  Lipid profile:   Lab Results   Component Value Date    CHOL 159 07/22/2016    TRIG 47 07/22/2016    HDL 47 10/04/2020     Thyroid functions:   Lab Results   Component Value Date    TSH 1.04 11/11/2021      Hepatic functions:   Lab Results   Component Value Date    ALT 23 05/23/2022    AST 21 05/23/2022    PROT 7.9 05/23/2022    BILITOT 0.40 05/23/2022    BILIDIR 0.09 05/23/2022    LABALBU 4.5 05/23/2022     Urine Analysis: No results found for: [id number] Elan Klein:      Health Maintenance Due   Topic Date Due    COVID-19 Vaccine (1) Never done    Low dose CT lung screening  Never done    Pneumococcal 0-64 years Vaccine (2 - PCV) 08/15/2018       ASSESSMENT AND PLAN:    1. Essential hypertension  Patient reports that he was traveling since his last visit with me and did not start the Hyzaar. He reports he continues to take Lopressor hydrochlorothiazide and individually. He reports he has had stress at work as he has been passed out for a promotion and not in a number years. He also during his road trip went with his daughter's boyfriend which was in a stressful situation. Patient is to begin Hyzaar and return to clinic in 1 week for blood pressure check      FOLLOW UP:   1. Sue Phillips received counseling on the following healthy behaviors: nutrition and exercise    2. Reviewed prior labs and health maintenance. 3.  Discussed use, benefit, and side effects of prescribed medications. Barriers to medication compliance addressed. All patient questions answered. Pt voiced understanding. 4.  Continue current medications, diet and exercise. No orders of the defined types were placed in this encounter. Completed Refills               Requested Prescriptions      No prescriptions requested or ordered in this encounter       5. Patient given educational materials - see patient instructions    6. Was a self-tracking handout given in paper form or via [employer]? Yes  If yes, see orders or list here. No orders of the defined types were placed in this encounter. Return in about 1 week (around 6/14/2022). Patient voiced understanding and agreed to treatment plan. This note is created with the assistance of a speech-recognition program. While intending to generate a document that actually reflects the content of the visit, the document can still have some mistakes which may not have been identified and corrected by editing.       Dr Alber Huff MD, [place]   Associate , Department of Internal Medicine  Resident Ambulatory Site Medical Director  [address] [place] [address] Internal Medicine  [address] [place] [address],[address]  Internal Medicine Clerkship - Gladys Paz                   6/7/2022, 3:00 PM

## 2022-06-14 ENCOUNTER — OFFICE VISIT (OUTPATIENT)
Dept: INTERNAL MEDICINE | Age: 57
End: 2022-06-14
Payer: MEDICARE

## 2022-06-14 VITALS
BODY MASS INDEX: 26.57 KG/M2 | DIASTOLIC BLOOD PRESSURE: 62 MMHG | WEIGHT: 196.2 LBS | HEART RATE: 83 BPM | SYSTOLIC BLOOD PRESSURE: 130 MMHG | HEIGHT: 72 IN

## 2022-06-14 DIAGNOSIS — B35.6 JOCK ITCH: ICD-10-CM

## 2022-06-14 DIAGNOSIS — I10 HYPERTENSION, UNSPECIFIED TYPE: Primary | ICD-10-CM

## 2022-06-14 PROCEDURE — 90677 PCV20 VACCINE IM: CPT | Performed by: INTERNAL MEDICINE

## 2022-06-14 PROCEDURE — G8427 DOCREV CUR MEDS BY ELIG CLIN: HCPCS | Performed by: INTERNAL MEDICINE

## 2022-06-14 PROCEDURE — 99213 OFFICE O/P EST LOW 20 MIN: CPT | Performed by: INTERNAL MEDICINE

## 2022-06-14 PROCEDURE — 3017F COLORECTAL CA SCREEN DOC REV: CPT | Performed by: INTERNAL MEDICINE

## 2022-06-14 PROCEDURE — G8419 CALC BMI OUT NRM PARAM NOF/U: HCPCS | Performed by: INTERNAL MEDICINE

## 2022-06-14 PROCEDURE — 4004F PT TOBACCO SCREEN RCVD TLK: CPT | Performed by: INTERNAL MEDICINE

## 2022-06-14 RX ORDER — CLOTRIMAZOLE AND BETAMETHASONE DIPROPIONATE 10; .64 MG/G; MG/G
CREAM TOPICAL
Qty: 45 G | Refills: 1 | Status: SHIPPED | OUTPATIENT
Start: 2022-06-14

## 2022-06-14 ASSESSMENT — ENCOUNTER SYMPTOMS
COUGH: 0
PHOTOPHOBIA: 0
ABDOMINAL DISTENTION: 0
BACK PAIN: 0
ABDOMINAL PAIN: 0
CHEST TIGHTNESS: 0

## 2022-06-14 NOTE — PROGRESS NOTES
[facility]   Progress Note        Date of patient's visit: 6/14/2022  Patient's Name:  John Hogue Sr. YOB: 1965        PCP:  Blu Luo MD    John Hogue Sr. is a 49 y.o. male who presents for   Chief Complaint   Patient presents with    Hypertension     1 week follow up    and follow up of chronic medical problems. Patient Active Problem List   Diagnosis    Essential hypertension    Sickle cell trait ([address]    Primary osteoarthritis of knee    Portal vein thrombosis, june 2017, completed course of warfarin     Gastroesophageal reflux disease without esophagitis    Chronic pain of right ankle    Diverticulosis    Polyp of sigmoid colon    Adenomatous polyp of descending colon    Rectal polyp       HISTORY OF PRESENT ILLNESS:    History was obtained from the patient. Hypertension  Patient is here for follow-up of elevated blood pressure. He is exercising and is adherent to a low-salt diet. Blood pressure is well controlled at home. Cardiac symptoms: none. Patient denies chest pain and dyspnea. Cardiovascular risk factors: hypertension and male gender. Use of agents associated with hypertension: none. History of target organ damage: none. Patient's allergies, medications, past medical, surgical, social and family histories were reviewed and updated as appropriate. ALLERGIES    No Known Allergies      MEDICATIONS:      Current Outpatient Medications   Medication Sig Dispense Refill    losartan-hydroCHLOROthiazide (HYZAAR) 50-12.5 MG per tablet Take 1 tablet by mouth daily (Patient not taking: Reported on 6/7/2022) 90 tablet 1    sildenafil (VIAGRA) 25 MG tablet Take 1 tablet by mouth as needed for Erectile Dysfunction 10 tablet 0    gabapentin (NEURONTIN) 300 MG capsule Take 1 capsule by mouth 3 times daily for 30 days.  90 capsule 5    omeprazole (PRILOSEC) 20 MG delayed release capsule Take 1 capsule by mouth daily 30 capsule 5    folic acid (FOLVITE) 1 MG tablet Take 1 tablet by mouth daily 30 tablet 5    vitamin D3 (CHOLECALCIFEROL) 25 MCG (1000 UT) TABS tablet Take 1 tablet by mouth daily 30 tablet 5    metoprolol tartrate (LOPRESSOR) 25 MG tablet Take 1 tablet by mouth 2 times daily 60 tablet 0    thiamine 100 MG tablet Take 1 tablet by mouth daily 30 tablet 5    aspirin (RA ASPIRIN EC ADULT LOW ST) 81 MG EC tablet Take 1 tablet by mouth daily 30 tablet 5    clotrimazole-betamethasone (LOTRISONE) 1-0.05 % cream apply to affected area twice a day 45 g 1    Omega-3 Fatty Acids (FISH OIL) 1000 MG CAPS Take 500 mg by mouth daily       Multiple Vitamins-Minerals (ONE DAILY ADULTS 50+ PO) Take by mouth      ibuprofen (ADVIL;MOTRIN) 800 MG tablet Take 1 tablet by mouth every 8 hours as needed for Pain With food 90 tablet 5     No current facility-administered medications for this visit. Patient Care Team:  Carroll Calvillo MD as PCP - General (Internal Medicine)  Carroll Calvillo MD as PCP - [facility]  Romeo Ruiz MD as Consulting Physician (Vascular Surgery)  Gretel Mccracken MD as Surgeon (General Surgery: Luis Luis)  Ade Painter MD as Consulting Physician (Infectious Diseases)  Gabriela Archer MD as Consulting Physician (Gastroenterology)  Romeo Ruiz MD as Consulting Physician (Vascular Surgery)    PAST MEDICAL AND SURGICAL HISTORY:      Past Medical History:   Diagnosis Date    ADHD (attention deficit hyperactivity disorder)     Gastroesophageal reflux disease without esophagitis 2/16/2018    Hepatic vein thrombosis ([facility] [address] 6/28/2017    Hypertension     Osteoarthritis      Past Surgical History:   Procedure Laterality Date    ACHILLES TENDON SURGERY  2010    right foot    COLONOSCOPY  02/20/2018    The mucosal exam was normal. Mild pan-diverticulosis was noted. A 13 mm Makenzie IIa lesion was noted in transverse colon. Lifting was achieved by submucosal injection of saline.  The polyp was resected using hot snare. Two hemoclips were placed to close mucosal defect. A 4 mm polyp was noted in descending colon- removed by cold biopsy    COLONOSCOPY  12/17/2021    COLORECTAL CANCER SCREENING, NOT HIGH RISK     COLONOSCOPY N/A 12/17/2021    COLORECTAL CANCER SCREENING, NOT HIGH RISK performed by Fahad Hutton DO at [facility]  2/28/2022    COLONOSCOPY W/ ENDOSCOPIC MUCOSAL RESECTION performed by Chester Nix MD at [facility]    HI COLONOSCOPY W/BIOPSY SINGLE/MULTIPLE N/A 2/20/2018    COLONOSCOPY WITH BIOPSY performed by Zheng Hurd MD at [place] [address] History     Tobacco Use    Smoking status: Current Every Day Smoker     Packs/day: 1.00     Years: 36.00     Pack years: 36.00     Types: Cigarettes     Start date: 11/30/1985    Smokeless tobacco: Never Used   Substance Use Topics    Alcohol use: Yes     Types: 1 - 2 Cans of beer per week     Comment: occassional     Mathew Catalan  reports that he has been smoking cigarettes. He started smoking about 36 years ago. He has a 36.00 pack-year smoking history. He has never used smokeless tobacco.    FAMILY HISTORY:      Family History   Problem Relation Age of Onset    Stroke Mother        REVIEW OF SYSTEMS:    Review of Systems   Constitutional: Negative for diaphoresis, fatigue and fever. HENT: Positive for dental problem. Negative for congestion, nosebleeds and postnasal drip. Eyes: Negative for photophobia and visual disturbance. Respiratory: Negative for cough and chest tightness. Cardiovascular: Negative for chest pain and leg swelling. Gastrointestinal: Negative for abdominal distention and abdominal pain. Endocrine: Negative for cold intolerance and heat intolerance. Genitourinary: Negative for dysuria and flank pain. Musculoskeletal: Negative for arthralgias and back pain. Neurological: Negative for light-headedness and headaches.    Psychiatric/Behavioral: Negative for agitation and behavioral problems.        PHYSICAL EXAM:      Vitals:    06/14/22 1505   BP: 130/62   Pulse: 83     BP Readings from Last 3 Encounters:   06/14/22 130/62   06/07/22 (!) 171/82   05/24/22 (!) 141/79       Physical Examination: General appearance - alert, well appearing, and in no distress  Mental status - alert, oriented to person, place, and time  Chest - clear to auscultation, no wheezes, rales or rhonchi, symmetric air entry  Heart - normal rate and regular rhythm  Abdomen - bowel sounds normal  Back exam - full range of motion, no tenderness, palpable spasm or pain on motion  Neurological - alert, oriented, normal speech, no focal findings or movement disorder noted  Musculoskeletal - no joint tenderness, deformity or swelling  Extremities - no pedal edema noted    LABORATORY FINDINGS:    CBC:   Lab Results   Component Value Date    WBC 9.4 05/23/2022    HGB 18.0 05/23/2022     05/23/2022     BMP:    Lab Results   Component Value Date     05/23/2022    K 3.7 05/23/2022    CL 99 05/23/2022    CO2 23 05/23/2022    BUN 17 05/23/2022    CREATININE 1.01 05/23/2022    GLUCOSE 90 05/23/2022    GLUCOSE 132 05/08/2012     Hemoglobin A1C:   Lab Results   Component Value Date    LABA1C 5.5 10/19/2021     Microalbumin Urine: No results found for: MICROALBUR  Lipid profile:   Lab Results   Component Value Date    CHOL 159 07/22/2016    TRIG 47 07/22/2016    HDL 47 10/04/2020     Thyroid functions:   Lab Results   Component Value Date    TSH 1.04 11/11/2021      Hepatic functions:   Lab Results   Component Value Date    ALT 23 05/23/2022    AST 21 05/23/2022    PROT 7.9 05/23/2022    BILITOT 0.40 05/23/2022    BILIDIR 0.09 05/23/2022    LABALBU 4.5 05/23/2022     Urine Analysis: No results found for: [id number] Elan Klein:      Health Maintenance Due   Topic Date Due    COVID-19 Vaccine (1) Never done    Low dose CT lung screening  Never done    Pneumococcal 0-64 years Vaccine (2 - PCV) 08/15/2018     A/P:     Diagnosis Orders   1. Hypertension, unspecified type     2. Jock itch  clotrimazole-betamethasone (LOTRISONE) 1-0.05 % cream   Blood pressure well controlled continue patient on  hyzaar  Return to clinic in 3 months  Patient with received pneumococcal vaccine today     FOLLOW UP:   1. Honey Barajas received counseling on the following healthy behaviors: nutrition and exercise    2. Reviewed prior labs and health maintenance. 3.  Discussed use, benefit, and side effects of prescribed medications. Barriers to medication compliance addressed. All patient questions answered. Pt voiced understanding. 4.  Continue current medications, diet and exercise. Orders Placed This Encounter   Medications    clotrimazole-betamethasone (LOTRISONE) 1-0.05 % cream     Sig: apply to affected area twice a day     Dispense:  45 g     Refill:  1          Completed Refills               Requested Prescriptions     Signed Prescriptions Disp Refills    clotrimazole-betamethasone (LOTRISONE) 1-0.05 % cream 45 g 1     Sig: apply to affected area twice a day       5. Patient given educational materials - see patient instructions    6. Was a self-tracking handout given in paper form or via [employer]? Yes  If yes, see orders or list here. Orders Placed This Encounter   Procedures    Pneumococcal, PCV20, PREVNAR 20, (age 25 yrs+), IM, PF       Return in about 3 months (around 9/14/2022). Patient voiced understanding and agreed to treatment plan. This note is created with the assistance of a speech-recognition program. While intending to generate a document that actually reflects the content of the visit, the document can still have some mistakes which may not have been identified and corrected by editing.       Dr Carroll Calvillo MD, [address]  Associate , Department of Internal Medicine  Resident Ambulatory Site Medical Director  [facility] Internal [address]  Internal Medicine Clerkship - Piero Aiken                   6/14/2022, 3:19 PM

## 2022-08-18 ENCOUNTER — OFFICE VISIT (OUTPATIENT)
Dept: GASTROENTEROLOGY | Age: 57
End: 2022-08-18
Payer: MEDICARE

## 2022-08-18 VITALS
DIASTOLIC BLOOD PRESSURE: 66 MMHG | HEIGHT: 71 IN | WEIGHT: 196 LBS | HEART RATE: 69 BPM | RESPIRATION RATE: 16 BRPM | BODY MASS INDEX: 27.44 KG/M2 | OXYGEN SATURATION: 100 % | SYSTOLIC BLOOD PRESSURE: 122 MMHG

## 2022-08-18 DIAGNOSIS — D12.5 ADENOMATOUS POLYP OF SIGMOID COLON: Primary | ICD-10-CM

## 2022-08-18 PROCEDURE — G8428 CUR MEDS NOT DOCUMENT: HCPCS | Performed by: INTERNAL MEDICINE

## 2022-08-18 PROCEDURE — 4004F PT TOBACCO SCREEN RCVD TLK: CPT | Performed by: INTERNAL MEDICINE

## 2022-08-18 PROCEDURE — G8419 CALC BMI OUT NRM PARAM NOF/U: HCPCS | Performed by: INTERNAL MEDICINE

## 2022-08-18 PROCEDURE — 99214 OFFICE O/P EST MOD 30 MIN: CPT | Performed by: INTERNAL MEDICINE

## 2022-08-18 PROCEDURE — 3017F COLORECTAL CA SCREEN DOC REV: CPT | Performed by: INTERNAL MEDICINE

## 2022-08-18 ASSESSMENT — ENCOUNTER SYMPTOMS
NAUSEA: 0
DIARRHEA: 0
CONSTIPATION: 0
BLOOD IN STOOL: 0
ABDOMINAL PAIN: 0
RECTAL PAIN: 0

## 2022-08-18 NOTE — PROGRESS NOTES
GI FOLLOW UP    INTERVAL HISTORY:       No referring provider defined for this encounter. Chief Complaint   Patient presents with    Follow-up     Colonoscopy DOS 2/8/22 14 polyps removal        1. Adenomatous polyp of sigmoid colon          HISTORY OF PRESENT ILLNESS:Mr. Alfredo Clark Sr. is a 49 y.o. male with a past history remarkable for HVT, referred for evaluation of endoscopic removal of advanced polyps. Patient is an active smoker and social drinker. Uses marijuana. Underwent a recent screening colonoscopy the patient was identified to have several polyps approximately 14 total.  Some were observed to be greater than 10 mm which are categorized into advance polyps. Patient referred to gastroenterology for endoscopic removal possible EMR of residual polyps. Smoker: Active 1 ppd x 25 yrs   Drinking history: Socially  Illicit drugs: Marijuana. Abdominal surgeries:None   Prior Colonoscopy: 12/2021   Prior EGD: None   FH of GI issues: Kidney Ca- sister. Past Medical,Family, and Social History reviewed and does contribute to the patient presenting condition. Patient's PMH/PSH,SH,PSYCH Hx, MEDs, ALLERGIES, and ROS were all reviewed and updated in the appropriate sections. PAST MEDICAL HISTORY:  Past Medical History:   Diagnosis Date    ADHD (attention deficit hyperactivity disorder)     Gastroesophageal reflux disease without esophagitis 2/16/2018    Hepatic vein thrombosis ([address] 6/28/2017    Hypertension     Osteoarthritis        Past Surgical History:   Procedure Laterality Date    ACHILLES TENDON SURGERY  2010    right foot    COLONOSCOPY  02/20/2018    The mucosal exam was normal. Mild pan-diverticulosis was noted. A 13 mm Makenzie IIa lesion was noted in transverse colon. Lifting was achieved by submucosal injection of saline. The polyp was resected using hot snare.  Two hemoclips were placed to close mucosal defect. A 4 mm polyp was noted in descending colon- removed by cold biopsy    COLONOSCOPY  12/17/2021    COLORECTAL CANCER SCREENING, NOT HIGH RISK     COLONOSCOPY N/A 12/17/2021    COLORECTAL CANCER SCREENING, NOT HIGH RISK performed by Chandler Golden DO at [facility]  2/28/2022    COLONOSCOPY W/ ENDOSCOPIC MUCOSAL RESECTION performed by Nicolle Pisano MD at [facility]    SD COLONOSCOPY W/BIOPSY SINGLE/MULTIPLE N/A 2/20/2018    COLONOSCOPY WITH BIOPSY performed by Gertrude Aguirre MD at [address] [place] [address] [address]:    Current Outpatient Medications:     clotrimazole-betamethasone (LOTRISONE) 1-0.05 % cream, apply to affected area twice a day, Disp: 45 g, Rfl: 1    losartan-hydroCHLOROthiazide (HYZAAR) 50-12.5 MG per tablet, Take 1 tablet by mouth daily, Disp: 90 tablet, Rfl: 1    sildenafil (VIAGRA) 25 MG tablet, Take 1 tablet by mouth as needed for Erectile Dysfunction, Disp: 10 tablet, Rfl: 0    gabapentin (NEURONTIN) 300 MG capsule, Take 1 capsule by mouth 3 times daily for 30 days. , Disp: 90 capsule, Rfl: 5    omeprazole (PRILOSEC) 20 MG delayed release capsule, Take 1 capsule by mouth daily, Disp: 30 capsule, Rfl: 5    folic acid (FOLVITE) 1 MG tablet, Take 1 tablet by mouth daily, Disp: 30 tablet, Rfl: 5    vitamin D3 (CHOLECALCIFEROL) 25 MCG (1000 UT) TABS tablet, Take 1 tablet by mouth daily, Disp: 30 tablet, Rfl: 5    metoprolol tartrate (LOPRESSOR) 25 MG tablet, Take 1 tablet by mouth 2 times daily, Disp: 60 tablet, Rfl: 0    thiamine 100 MG tablet, Take 1 tablet by mouth daily, Disp: 30 tablet, Rfl: 5    aspirin (RA ASPIRIN EC ADULT LOW ST) 81 MG EC tablet, Take 1 tablet by mouth daily, Disp: 30 tablet, Rfl: 5    Omega-3 Fatty Acids (FISH OIL) 1000 MG CAPS, Take 500 mg by mouth daily , Disp: , Rfl:     Multiple Vitamins-Minerals (ONE DAILY ADULTS 50+ PO), Take by mouth, Disp: , Rfl:     ibuprofen (ADVIL;MOTRIN) 800 MG tablet, Take 1 tablet by mouth every 8 hours as needed for Pain With food, Disp: 90 tablet, Rfl: 5    ALLERGIES:   No Known Allergies    FAMILY HISTORY:       Problem Relation Age of Onset    Stroke Mother          SOCIAL HISTORY:   Social History     Socioeconomic History    Marital status:      Spouse name: Not on file    Number of children: Not on file    Years of education: Not on file    Highest education level: Not on file   Occupational History    Not on file   Tobacco Use    Smoking status: Every Day     Packs/day: 1.00     Years: 36.00     Pack years: 36.00     Types: Cigarettes     Start date: 11/30/1985    Smokeless tobacco: Never   Vaping Use    Vaping Use: Never used   Substance and Sexual Activity    Alcohol use: Yes     Types: 1 - 2 Cans of beer per week     Comment: occassional    Drug use: Yes     Frequency: 2.0 times per week     Types: Marijuana Juanetta Braymer)    Sexual activity: Yes     Partners: Female   Other Topics Concern    Not on file   Social History Narrative    Not on file     Social Determinants of Health     Financial Resource Strain: Low Risk     Difficulty of Paying Living Expenses: Not hard at all   Food Insecurity: No Food Insecurity    Worried About Running Out of Food in the Last Year: Never true    [address] in the Last Year: Never true   Transportation Needs: No Transportation Needs    Lack of Transportation (Medical): No    Lack of Transportation (Non-Medical): No   Physical Activity: Not on file   Stress: Not on file   Social Connections: Not on file   Intimate Partner Violence: Not on file   Housing Stability: Not on file       REVIEW OF SYSTEMS: A 12-point review of systems was obtained and pertinent positives and negatives were listed below. REVIEW OF SYSTEMS:     Constitutional: No fever, no chills, no lethargy, no weakness. HEENT:  No headache, otalgia, itchy eyes, nasal discharge or sore throat. Cardiac:  No chest pain, dyspnea, orthopnea or PND.   Chest:   No cough, phlegm or wheezing. Abdomen:      Detailed by MA   Neuro:  No focal weakness, abnormal movements or seizure like activity. Skin:   No rashes, no itching. :   No hematuria, no pyuria, no dysuria, no flank pain. Extremities:  No swelling or joint pains. ROS was otherwise negative    Review of Systems   Gastrointestinal:  Negative for abdominal pain, blood in stool, constipation, diarrhea, nausea and rectal pain. PHYSICAL EXAMINATION: Vital signs reviewed per the nursing documentation. /66 (Site: Right Upper Arm, Position: Sitting, Cuff Size: Medium Adult)   Pulse 69   Resp 16   Ht 5' 11\" (1.803 m)   Wt 196 lb (88.9 kg)   SpO2 100%   BMI 27.34 kg/m²   Body mass index is 27.34 kg/m². Physical Exam    Physical Exam   Constitutional: Patient is oriented to person, place, and time. Patient appears well-developed and well-nourished. HENT:   Head: Normocephalic and atraumatic. Eyes: Pupils are equal, round, and reactive to light. EOM are normal.   Neck: Normal range of motion. Neck supple. No JVD present. No tracheal deviation present. No thyromegaly present. Cardiovascular: Normal rate, regular rhythm, normal heart sounds and intact distal pulses. Pulmonary/Chest: Effort normal and breath sounds normal. No stridor. No respiratory distress. He has no wheezes. He has no rales. He exhibits no tenderness. Abdominal: Soft. Bowel sounds are normal. He exhibits no distension and no mass. There is no tenderness. There is no rebound and no guarding. No hernia. Musculoskeletal: Normal range of motion. Lymphadenopathy:    Patient has no cervical adenopathy. Neurological: Patient is alert and oriented to person, place, and time. Psychiatric: Patient has a normal mood and affect.  Patient behavior is normal.       LABORATORY DATA: Reviewed  Lab Results   Component Value Date    WBC 9.4 05/23/2022    HGB 18.0 (H) 05/23/2022    HCT 52.2 (H) 05/23/2022    MCV 84.3 05/23/2022     05/23/2022  (L) 05/23/2022    K 3.7 05/23/2022    CL 99 05/23/2022    CO2 23 05/23/2022    BUN 17 05/23/2022    CREATININE 1.01 05/23/2022    LABALBU 4.5 05/23/2022    BILITOT 0.40 05/23/2022    ALKPHOS 100 05/23/2022    AST 21 05/23/2022    ALT 23 05/23/2022    INR 0.9 05/23/2022         Lab Results   Component Value Date    RBC 6.19 (H) 05/23/2022    HGB 18.0 (H) 05/23/2022    MCV 84.3 05/23/2022    MCH 29.1 05/23/2022    MCHC 34.5 05/23/2022    RDW 12.9 05/23/2022    MPV 9.8 05/23/2022    BASOPCT 1 05/23/2022    LYMPHSABS 1.60 05/23/2022    MONOSABS 0.85 05/23/2022    NEUTROABS 6.54 05/23/2022    EOSABS 0.30 05/23/2022    BASOSABS 0.09 05/23/2022         DIAGNOSTIC TESTING:     No results found. IMPRESSION: Jazlyn Medina Sr. is a 49 y.o. male with a past history remarkable for HVT, referred for evaluation of endoscopic removal of advanced polyps. Patient is an active smoker and social drinker. Uses marijuana. Underwent a recent screening colonoscopy the patient was identified to have several polyps approximately 14 total.  Some were observed to be greater than 10 mm which are categorized into advance polyps. Patient referred to gastroenterology for endoscopic removal possible EMR of residual polyps. Colonoscopy performed in February 2022-repeat colonoscopy and 12 months. Assessment  1. Adenomatous polyp of sigmoid colon        Ailin Schwab was seen today for follow-up. Diagnoses and all orders for this visit:    Adenomatous polyp of sigmoid colon  -     COLONOSCOPY (Diagnostic); Future-patient was identified to have advanced polyps in the rectosigmoid area, EMR resection was performed. Repeat colonoscopy ordered for surveillance. RTC: 3 months    Additional comments: Thank you for allowing me to participate in the care of Mr. Bossman Rai. For any further questions please do not hesitate to contact me.     I have reviewed and agree with the ROS entered by the MA/LPN from today's encounter documented in a separate note. Geraldo Holliday MD, MPH   Board Certified in Gastroenterology  Board Certified in [address] [place] [address] #: [phone]    this note is created with the assistance of a speech recognition program.  While intending to generate a document that actually reflects the content of the visit, the document can still have some errors including those of syntax and sound a like substitutions which may escape proof reading. It such instances, actual meaning can be extrapolated by contextual diversion.

## 2022-08-19 ENCOUNTER — TELEPHONE (OUTPATIENT)
Dept: GASTROENTEROLOGY | Age: 57
End: 2022-08-19

## 2022-08-19 NOTE — TELEPHONE ENCOUNTER
Colonoscopy/Dona Trejo    PBG 2/16/22 @ 11:00am  \Written/verbal instructions given @ visit    Miralax/dulcolax (to be sent  Jan 2023)

## 2022-09-21 DIAGNOSIS — I10 ESSENTIAL (PRIMARY) HYPERTENSION: ICD-10-CM

## 2022-09-22 NOTE — TELEPHONE ENCOUNTER
E-scribe request from [address] [place] [address] for Metoprolol 25 mg.   Patient is on the waitlist for an appt for an appt      Health Maintenance   Topic Date Due    COVID-19 Vaccine (1) Never done    Low dose CT lung screening  Never done    Flu vaccine (1) Never done    Annual Wellness Visit (AWV)  02/25/2023 (Originally 5/29/2019)    DTaP/Tdap/Td vaccine (1 - Tdap) 03/29/2023 (Originally 11/27/1986)    Shingles vaccine (1 of 2) 03/29/2023 (Originally 11/27/2017)    Depression Screen  04/26/2023    Colorectal Cancer Screen  02/28/2025    Lipids  10/04/2025    Pneumococcal 0-64 years Vaccine  Completed    Hepatitis C screen  Completed    HIV screen  Completed    Hepatitis A vaccine  Aged Out    Hepatitis B vaccine  Aged Out    Hib vaccine  Aged Out    Meningococcal (ACWY) vaccine  Aged Out             (applicable per patient's age: Cancer Screenings, Depression Screening, Fall Risk Screening, Immunizations)    Hemoglobin A1C (%)   Date Value   10/19/2021 5.5   10/04/2020 5.8   02/16/2018 5.8     LDL Cholesterol (mg/dL)   Date Value   10/04/2020 95     AST (U/L)   Date Value   05/23/2022 21     ALT (U/L)   Date Value   05/23/2022 23     BUN (mg/dL)   Date Value   05/23/2022 17      (goal A1C is < 7)   (goal LDL is <100) need 30-50% reduction from baseline     BP Readings from Last 3 Encounters:   08/18/22 122/66   06/14/22 130/62   06/07/22 (!) 171/82    (goal /80)      All Future Testing planned in [facility]:  Lab Frequency Next Occurrence   COVID-19 Once 02/24/2022   COLONOSCOPY (Diagnostic) Once 02/07/2023       Next Visit Date:  Future Appointments   Date Time Provider Marco A Barreto   2/28/2023  2:00 PM MD Crystal Gibbons [facility]            Patient Active Problem List:     Essential hypertension     Sickle cell trait ([facility] [address] [facility]     Primary osteoarthritis of knee     Portal vein thrombosis, june 2017, completed course of warfarin      Gastroesophageal reflux disease without esophagitis     Chronic pain of right ankle Diverticulosis     Polyp of sigmoid colon     Adenomatous polyp of descending colon     Rectal polyp

## 2022-10-11 ENCOUNTER — OFFICE VISIT (OUTPATIENT)
Dept: INTERNAL MEDICINE | Age: 57
End: 2022-10-11
Payer: MEDICARE

## 2022-10-11 ENCOUNTER — TELEPHONE (OUTPATIENT)
Dept: INTERNAL MEDICINE | Age: 57
End: 2022-10-11

## 2022-10-11 VITALS
TEMPERATURE: 98.1 F | WEIGHT: 198.8 LBS | HEART RATE: 83 BPM | OXYGEN SATURATION: 95 % | SYSTOLIC BLOOD PRESSURE: 127 MMHG | DIASTOLIC BLOOD PRESSURE: 69 MMHG | BODY MASS INDEX: 27.83 KG/M2 | HEIGHT: 71 IN

## 2022-10-11 DIAGNOSIS — Z72.0 TOBACCO ABUSE: ICD-10-CM

## 2022-10-11 DIAGNOSIS — I10 ESSENTIAL (PRIMARY) HYPERTENSION: Primary | ICD-10-CM

## 2022-10-11 DIAGNOSIS — K04.7 DENTAL ABSCESS: ICD-10-CM

## 2022-10-11 DIAGNOSIS — N52.9 ERECTILE DYSFUNCTION, UNSPECIFIED ERECTILE DYSFUNCTION TYPE: ICD-10-CM

## 2022-10-11 DIAGNOSIS — I10 ESSENTIAL HYPERTENSION: Primary | ICD-10-CM

## 2022-10-11 PROCEDURE — 99211 OFF/OP EST MAY X REQ PHY/QHP: CPT | Performed by: INTERNAL MEDICINE

## 2022-10-11 PROCEDURE — 99214 OFFICE O/P EST MOD 30 MIN: CPT | Performed by: INTERNAL MEDICINE

## 2022-10-11 RX ORDER — SILDENAFIL 50 MG/1
50 TABLET, FILM COATED ORAL PRN
Qty: 30 TABLET | Refills: 0 | Status: SHIPPED | OUTPATIENT
Start: 2022-10-11

## 2022-10-11 ASSESSMENT — ENCOUNTER SYMPTOMS
GASTROINTESTINAL NEGATIVE: 1
EYES NEGATIVE: 1
ALLERGIC/IMMUNOLOGIC NEGATIVE: 1
RESPIRATORY NEGATIVE: 1

## 2022-10-11 NOTE — PATIENT INSTRUCTIONS
Return To Clinic 11/10/2022 . After Visit Summary  given and reviewed. It is very important for your care that you keep your appointment. If for some reason you are unable to keep your appointment it is equally important that you call our office at [phone] to cancel your appointment and reschedule. Failure to do so may result in your termination from our practice.

## 2022-10-11 NOTE — PROGRESS NOTES
[facility]   Progress Note        Date of patient's visit: 10/11/2022  Patient's Name:  Jose Diaz Sr. YOB: 1965        PCP:  Eamon Harris MD    Jose Diaz Sr. is a 49 y.o. male who presents for   Chief Complaint   Patient presents with    Hypertension     Pt did not take medication today    Hip Pain     X 2 day, pt state he only been take gabapentin an they haven't been working    Health Maintenance     Pt refused flu    and follow up of chronic medical problems. Patient Active Problem List   Diagnosis    Essential hypertension    Sickle cell trait (HCC)    Primary osteoarthritis of knee    Portal vein thrombosis, june 2017, completed course of warfarin     Gastroesophageal reflux disease without esophagitis    Chronic pain of right ankle    Diverticulosis    Polyp of sigmoid colon    Adenomatous polyp of descending colon    Rectal polyp       HISTORY OF PRESENT ILLNESS:    History was obtained from the patient. Hip Pain  Patient complains of left hip pain. Onset of the symptoms was several days ago. Inciting event: none. The patient reports the hip pain is aggravated by walking. Associated symptoms: none. Previous visits for this problem: none. Evaluation to date: none. Treatment to date:  was using gabapentin . Hypertension:  Home blood pressure monitoring: No.  He is adherent to a low sodium diet. Patient denies chest pain and shortness of breath. Antihypertensive medication side effects: no medication side effects noted. Use of agents associated with hypertension: none. Sodium (mmol/L)   Date Value   05/23/2022 134 (L)    BUN (mg/dL)   Date Value   05/23/2022 17    Glucose (mg/dL)   Date Value   05/23/2022 90   05/08/2012 132 (H)      Potassium (mmol/L)   Date Value   05/23/2022 3.7    Creatinine (mg/dL)   Date Value   05/23/2022 1.01       Erectile Dysfunction  Patient complains of erectile dysfunction. Partial erections occur spontaneously. Libido is not affected. Previous treatment of ED includes viagra 25mg which is not effective. Patient's allergies, medications, past medical, surgical, social and family histories were reviewed and updated as appropriate. ALLERGIES    No Known Allergies      MEDICATIONS:      Current Outpatient Medications   Medication Sig Dispense Refill    metoprolol tartrate (LOPRESSOR) 25 MG tablet take 1 tablet by mouth twice a day 60 tablet 0    clotrimazole-betamethasone (LOTRISONE) 1-0.05 % cream apply to affected area twice a day 45 g 1    losartan-hydroCHLOROthiazide (HYZAAR) 50-12.5 MG per tablet Take 1 tablet by mouth daily 90 tablet 1    sildenafil (VIAGRA) 25 MG tablet Take 1 tablet by mouth as needed for Erectile Dysfunction 10 tablet 0    omeprazole (PRILOSEC) 20 MG delayed release capsule Take 1 capsule by mouth daily 30 capsule 5    folic acid (FOLVITE) 1 MG tablet Take 1 tablet by mouth daily 30 tablet 5    vitamin D3 (CHOLECALCIFEROL) 25 MCG (1000 UT) TABS tablet Take 1 tablet by mouth daily 30 tablet 5    thiamine 100 MG tablet Take 1 tablet by mouth daily 30 tablet 5    aspirin (RA ASPIRIN EC ADULT LOW ST) 81 MG EC tablet Take 1 tablet by mouth daily 30 tablet 5    Omega-3 Fatty Acids (FISH OIL) 1000 MG CAPS Take 500 mg by mouth daily       Multiple Vitamins-Minerals (ONE DAILY ADULTS 50+ PO) Take by mouth      ibuprofen (ADVIL;MOTRIN) 800 MG tablet Take 1 tablet by mouth every 8 hours as needed for Pain With food 90 tablet 5    gabapentin (NEURONTIN) 300 MG capsule Take 1 capsule by mouth 3 times daily for 30 days. 90 capsule 5     No current facility-administered medications for this visit.        Patient Care Team:  Wen Garnica MD as PCP - General (Internal Medicine)  Wen Garnica MD as [facility]  Mitzi Ochoa MD as Consulting Physician (Vascular Surgery)  Erika Dickey MD as Surgeon (General Surgery: [facility])  Samara Hare Awa Burks MD as Consulting Physician (Infectious Diseases)  Antoinette Sethi MD as Consulting Physician (Gastroenterology)  Mitzi Ochoa MD as Consulting Physician (Vascular Surgery)    PAST MEDICAL AND SURGICAL HISTORY:      Past Medical History:   Diagnosis Date    ADHD (attention deficit hyperactivity disorder)     Gastroesophageal reflux disease without esophagitis 2/16/2018    Hepatic vein thrombosis ([address] 6/28/2017    Hypertension     Osteoarthritis      Past Surgical History:   Procedure Laterality Date    ACHILLES TENDON SURGERY  2010    right foot    COLONOSCOPY  02/20/2018    The mucosal exam was normal. Mild pan-diverticulosis was noted. A 13 mm Makenzie IIa lesion was noted in transverse colon. Lifting was achieved by submucosal injection of saline. The polyp was resected using hot snare. Two hemoclips were placed to close mucosal defect. A 4 mm polyp was noted in descending colon- removed by cold biopsy    COLONOSCOPY  12/17/2021    COLORECTAL CANCER SCREENING, NOT HIGH RISK     COLONOSCOPY N/A 12/17/2021    COLORECTAL CANCER SCREENING, NOT HIGH RISK performed by Adam Bonds DO at [address] [place] [address]  2/28/2022    COLONOSCOPY W/ ENDOSCOPIC MUCOSAL RESECTION performed by Nam Gardner MD at [facility]    PA COLONOSCOPY W/BIOPSY SINGLE/MULTIPLE N/A 2/20/2018    COLONOSCOPY WITH BIOPSY performed by Antoinette Sethi MD at [place] [address] History     Tobacco Use    Smoking status: Every Day     Packs/day: 1.00     Years: 36.00     Pack years: 36.00     Types: Cigarettes     Start date: 11/30/1985    Smokeless tobacco: Never   Substance Use Topics    Alcohol use: Yes     Types: 1 - 2 Cans of beer per week     Comment: occassional     [facility] that [facility] has been [facility]. He started smoking about 36 years ago. He has a 36.00 pack-year smoking history.  He has never used smokeless tobacco.    FAMILY HISTORY:      Family History   Problem Relation Age of Onset    Stroke Mother        REVIEW OF SYSTEMS:    Review of Systems   Constitutional: Negative. HENT: Negative. Eyes: Negative. Respiratory: Negative. Cardiovascular: Negative. Gastrointestinal: Negative. Endocrine: Negative. Genitourinary: Negative. Musculoskeletal:  Positive for joint swelling. Skin: Negative. Allergic/Immunologic: Negative. Neurological: Negative. Hematological: Negative. Psychiatric/Behavioral: Negative.            PHYSICAL EXAM:      Vitals:    10/11/22 1123   BP: 127/69   Pulse: 83   Temp: 98.1 °F (36.7 °C)   SpO2: 95%     BP Readings from Last 3 Encounters:   10/11/22 127/69   08/18/22 122/66   06/14/22 130/62       Physical Examination: General appearance - alert, well appearing, and in no distress  Mental status - alert, oriented to person, place, and time  Chest - clear to auscultation, no wheezes, rales or rhonchi, symmetric air entry  Heart - normal rate and regular rhythm  Abdomen - bowel sounds normal  Back exam - full range of motion, no tenderness, palpable spasm or pain on motion  Neurological - alert, oriented, normal speech, no focal findings or movement disorder noted  Musculoskeletal - no joint tenderness, deformity or swelling  Extremities - no pedal edema noted    LABORATORY FINDINGS:    CBC:   Lab Results   Component Value Date/Time    WBC 9.4 05/23/2022 09:12 AM    HGB 18.0 05/23/2022 09:12 AM     05/23/2022 09:12 AM     BMP:    Lab Results   Component Value Date/Time     05/23/2022 09:12 AM    K 3.7 05/23/2022 09:12 AM    CL 99 05/23/2022 09:12 AM    CO2 23 05/23/2022 09:12 AM    BUN 17 05/23/2022 09:12 AM    CREATININE 1.01 05/23/2022 09:12 AM    GLUCOSE 90 05/23/2022 09:12 AM    GLUCOSE 132 05/08/2012 09:41 AM     Hemoglobin A1C:   Lab Results   Component Value Date/Time    LABA1C 5.5 10/19/2021 01:06 PM     Microalbumin Urine: No results found for: MICROALBUR  Lipid profile:   Lab Results   Component Value Date/Time    CHOL 159 07/22/2016 11:13 AM    TRIG 47 07/22/2016 11:13 AM    HDL 47 10/04/2020 10:44 AM     Thyroid functions:   Lab Results   Component Value Date/Time    TSH 1.04 11/11/2021 08:58 AM      Hepatic functions:   Lab Results   Component Value Date/Time    ALT 23 05/23/2022 09:12 AM    AST 21 05/23/2022 09:12 AM    PROT 7.9 05/23/2022 09:12 AM    BILITOT 0.40 05/23/2022 09:12 AM    BILIDIR 0.09 05/23/2022 09:12 AM    LABALBU 4.5 05/23/2022 09:12 AM     Urine Analysis: No results found for: [id number] Elan Klein:      Health Maintenance Due   Topic Date Due    COVID-19 Vaccine (1) Never done    Low dose CT lung screening  Never done    Flu vaccine (1) Never done       ASSESSMENT AND PLAN:       Diagnosis Orders   1. Essential (primary) hypertension         2. Erectile dysfunction, unspecified erectile dysfunction type  sildenafil (VIAGRA) 50 MG tablet      3. Dental abscess        4. Tobacco abuse        BP controlled on Hyzaar and metoprolol  Inc viagra from 25-50mg  Use motrin and given stretches for hip  Given a list of oral surgeons  Counseled on smoking cessation  Started school for criminal justice. (Son was murdered)  Rtc in 6 weeks to er eval ED and hip pain. Consider urology consult and xrays        FOLLOW UP:   1. Cloria Dessert received counseling on the following healthy behaviors: nutrition and exercise    2. Reviewed prior labs and health maintenance. 3.  Discussed use, benefit, and side effects of prescribed medications. Barriers to medication compliance addressed. All patient questions answered. Pt voiced understanding. 4.  Continue current medications, diet and exercise.               Orders Placed This Encounter   Medications    sildenafil (VIAGRA) 50 MG tablet     Sig: Take 1 tablet by mouth as needed for Erectile Dysfunction     Dispense:  30 tablet     Refill:  0          Completed Refills               Requested Prescriptions     Signed Prescriptions Disp Refills sildenafil (VIAGRA) 50 MG tablet 30 tablet 0     Sig: Take 1 tablet by mouth as needed for Erectile Dysfunction       5. Patient given educational materials - see patient instructions    6. Was a self-tracking handout given in paper form or via [employer]? Yes  If yes, see orders or list here. No orders of the defined types were placed in this encounter. No follow-ups on file. Patient voiced understanding and agreed to treatment plan. This note is created with the assistance of a speech-recognition program. While intending to generate a document that actually reflects the content of the visit, the document can still have some mistakes which may not have been identified and corrected by editing.       Dr Mirlande Truong MD, [facility] , Department of Internal Medicine  Resident Ambulatory Site Medical Director  [address] [facility] Internal Medicine  [address] [place] [address] Floor  Internal Medicine Clerkship - Josue Rosario                   10/11/2022, 11:53 AM

## 2022-10-11 NOTE — TELEPHONE ENCOUNTER
Dr. Susan Stevenson, pt stopped at office window asking about referral to the \"fresh fruits and vegetables\" program. I have pended a referral to [employer], please sign and we will reach out to pt to give him additional information about the program and get him set up. Please route encounter back to me once signed so I can make sure Anthony Hager receives pt's information. Thank you!

## 2022-11-01 DIAGNOSIS — I10 ESSENTIAL (PRIMARY) HYPERTENSION: ICD-10-CM

## 2022-11-01 NOTE — TELEPHONE ENCOUNTER
E-scribe request from [employer] for Metoprolol 25 mg. Patient has an appt on 11/10/22.       Health Maintenance   Topic Date Due    COVID-19 Vaccine (1) Never done    Low dose CT lung screening  Never done    Flu vaccine (1) Never done    Annual Wellness Visit (AWV)  02/25/2023 (Originally 5/29/2019)    DTaP/Tdap/Td vaccine (1 - Tdap) 03/29/2023 (Originally 11/27/1986)    Shingles vaccine (1 of 2) 03/29/2023 (Originally 11/27/2017)    Depression Screen  04/26/2023    Colorectal Cancer Screen  02/28/2025    Lipids  10/04/2025    Pneumococcal 0-64 years Vaccine  Completed    Hepatitis C screen  Completed    HIV screen  Completed    Hepatitis A vaccine  Aged Out    Hib vaccine  Aged Out    Meningococcal (ACWY) vaccine  Aged Out             (applicable per patient's age: Cancer Screenings, Depression Screening, Fall Risk Screening, Immunizations)    Hemoglobin A1C (%)   Date Value   10/19/2021 5.5   10/04/2020 5.8   02/16/2018 5.8     LDL Cholesterol (mg/dL)   Date Value   10/04/2020 95     AST (U/L)   Date Value   05/23/2022 21     ALT (U/L)   Date Value   05/23/2022 23     BUN (mg/dL)   Date Value   05/23/2022 17      (goal A1C is < 7)   (goal LDL is <100) need 30-50% reduction from baseline     BP Readings from Last 3 Encounters:   10/11/22 127/69   08/18/22 122/66   06/14/22 130/62    (goal /80)      All Future Testing planned in [facility]:  Lab Frequency Next Occurrence   COVID-19 Once 02/24/2022   COLONOSCOPY (Diagnostic) Once 02/07/2023       Next Visit Date:  Future Appointments   Date Time Provider Marco A Barreto   11/10/2022  2:20 PM Ant Claros MD [facility]   2/28/2023  2:00 PM Gold Arrington MD [facility] 83            Patient Active Problem List:     Essential hypertension     Sickle cell trait ([facility] [address]     Primary osteoarthritis of knee     Portal vein thrombosis, june 2017, completed course of warfarin      Gastroesophageal reflux disease without esophagitis     Chronic pain of right ankle Diverticulosis     Polyp of sigmoid colon     Adenomatous polyp of descending colon     Rectal polyp

## 2022-11-29 ENCOUNTER — TELEMEDICINE (OUTPATIENT)
Dept: INTERNAL MEDICINE | Age: 57
End: 2022-11-29
Payer: MEDICARE

## 2022-11-29 DIAGNOSIS — Z59.819 HOUSING INSTABILITY: ICD-10-CM

## 2022-11-29 DIAGNOSIS — R23.8 SKIN BREAKDOWN: Primary | ICD-10-CM

## 2022-11-29 DIAGNOSIS — M17.0 PRIMARY OSTEOARTHRITIS OF BOTH KNEES: ICD-10-CM

## 2022-11-29 DIAGNOSIS — N52.9 ERECTILE DYSFUNCTION, UNSPECIFIED ERECTILE DYSFUNCTION TYPE: ICD-10-CM

## 2022-11-29 PROCEDURE — 99441 PR PHYS/QHP TELEPHONE EVALUATION 5-10 MIN: CPT | Performed by: INTERNAL MEDICINE

## 2022-11-29 PROCEDURE — 99211 OFF/OP EST MAY X REQ PHY/QHP: CPT | Performed by: INTERNAL MEDICINE

## 2022-11-29 RX ORDER — IBUPROFEN 800 MG/1
800 TABLET ORAL EVERY 8 HOURS PRN
Qty: 90 TABLET | Refills: 5 | Status: SHIPPED | OUTPATIENT
Start: 2022-11-29

## 2022-11-29 RX ORDER — ZINC OXIDE 10 G/100G
SPRAY TOPICAL
Qty: 170 G | Refills: 5 | Status: SHIPPED | OUTPATIENT
Start: 2022-11-29

## 2022-11-29 RX ORDER — SILDENAFIL 50 MG/1
50 TABLET, FILM COATED ORAL PRN
Qty: 30 TABLET | Refills: 0 | Status: SHIPPED | OUTPATIENT
Start: 2022-11-29

## 2022-11-29 RX ORDER — GABAPENTIN 300 MG/1
300 CAPSULE ORAL DAILY
COMMUNITY
Start: 2022-11-24

## 2022-11-29 SDOH — ECONOMIC STABILITY - HOUSING INSECURITY: HOUSING INSTABILITY UNSPECIFIED: Z59.819

## 2022-11-29 ASSESSMENT — PATIENT HEALTH QUESTIONNAIRE - PHQ9
SUM OF ALL RESPONSES TO PHQ QUESTIONS 1-9: 0
SUM OF ALL RESPONSES TO PHQ QUESTIONS 1-9: 0
2. FEELING DOWN, DEPRESSED OR HOPELESS: 0
SUM OF ALL RESPONSES TO PHQ QUESTIONS 1-9: 0
SUM OF ALL RESPONSES TO PHQ QUESTIONS 1-9: 0
DEPRESSION UNABLE TO ASSESS: PT REFUSES
1. LITTLE INTEREST OR PLEASURE IN DOING THINGS: 0
SUM OF ALL RESPONSES TO PHQ9 QUESTIONS 1 & 2: 0

## 2022-11-29 NOTE — PROGRESS NOTES
Evelyne Wheat Sr. is a 56 y.o. male evaluated via telephone on 11/29/2022 for Hypertension (6wk htn)  . Documentation:  61-year-old gentleman reporting a great deal of stress currently due to being homeless. He reports he is trying to work with the fair housing people but is not making much progress as he is trying to work full-time and go to school online. Patient reports he is also trying to change pharmacies as he has had many delays in medication . He is reporting some skin blistering due to his belt cutting into his skin in his lower abdomen region. Reports no open sores or seeping. Diagnosis Orders   1. Skin breakdown  Zinc Oxide (DR SMITHS ADULT BARRIER) 10 % AERO      2. Primary osteoarthritis of both knees  ibuprofen (ADVIL;MOTRIN) 800 MG tablet      3. Erectile dysfunction, unspecified erectile dysfunction type  sildenafil (VIAGRA) 50 MG tablet      4. Housing instability  [facility] Referral for Social Determinants of Health (Primary Care Practices)        Refills provided  Did discuss with him the referral for housing instability which patient would be very appreciative of  Will have patient follow-up in the office in a few months as he can stabilize things socially. Total Time: minutes: 5-10 minutes    Evelyne Wheat Sr. was evaluated through a synchronous (real-time) audio encounter. Patient identification was verified at the start of the visit. He (or guardian if applicable) is aware that this is a billable service, which includes applicable co-pays. This visit was conducted with the patient's (and/or legal guardian's) verbal consent. He has not had a related appointment within my department in the past 7 days or scheduled within the next 24 hours. The patient was located at Other: work . The provider was located at [facility] (Appt Dept): [address] [facility],  [address] [place] [address].     Note: not billable if this call serves to triage the patient into an appointment for the relevant concern    Deidra Lyles MD

## 2022-12-02 DIAGNOSIS — I10 ESSENTIAL HYPERTENSION: ICD-10-CM

## 2022-12-02 DIAGNOSIS — I10 ESSENTIAL (PRIMARY) HYPERTENSION: ICD-10-CM

## 2022-12-02 NOTE — TELEPHONE ENCOUNTER
Request for lopressor and metoprolol. Next hanny on 3/7/23.     Next Visit Date:  Future Appointments   Date Time Provider Marco A Mary Lou   2/28/2023  2:00 PM Jocelyn Hammond MD [facility]   3/7/2023  1:00 PM Chitra Nelson MD [facility] IM [address] Maintenance   Topic Date Due    COVID-19 Vaccine (1) Never done    Low dose CT lung screening  Never done    Flu vaccine (1) Never done    Annual Wellness Visit (AWV)  02/25/2023 (Originally 5/29/2019)    DTaP/Tdap/Td vaccine (1 - Tdap) 03/29/2023 (Originally 11/27/1986)    Shingles vaccine (1 of 2) 03/29/2023 (Originally 11/27/2017)    Depression Screen  11/29/2023    Colorectal Cancer Screen  02/28/2025    Lipids  10/04/2025    Pneumococcal 0-64 years Vaccine  Completed    Hepatitis C screen  Completed    HIV screen  Completed    Hepatitis A vaccine  Aged Out    Hib vaccine  Aged Out    Meningococcal (ACWY) vaccine  Aged Out       Hemoglobin A1C (%)   Date Value   10/19/2021 5.5   10/04/2020 5.8   02/16/2018 5.8             ( goal A1C is < 7)   No results found for: LABMICR  LDL Cholesterol (mg/dL)   Date Value   10/04/2020 95       (goal LDL is <100)   AST (U/L)   Date Value   05/23/2022 21     ALT (U/L)   Date Value   05/23/2022 23     BUN (mg/dL)   Date Value   05/23/2022 17     BP Readings from Last 3 Encounters:   10/11/22 127/69   08/18/22 122/66   06/14/22 130/62          (goal 120/80)    All Future Testing planned in [facility]  Lab Frequency Next Occurrence   COVID-19 Once 02/24/2022   COLONOSCOPY (Diagnostic) Once 02/07/2023         Patient Active Problem List:     Essential hypertension     Sickle cell trait ([facility] [address]     Primary osteoarthritis of knee     Portal vein thrombosis, june 2017, completed course of warfarin      Gastroesophageal reflux disease without esophagitis     Chronic pain of right ankle     Diverticulosis     Polyp of sigmoid colon     Adenomatous polyp of descending colon     Rectal polyp

## 2022-12-06 ENCOUNTER — TELEPHONE (OUTPATIENT)
Dept: INTERNAL MEDICINE | Age: 57
End: 2022-12-06

## 2022-12-06 RX ORDER — LOSARTAN POTASSIUM AND HYDROCHLOROTHIAZIDE 12.5; 5 MG/1; MG/1
TABLET ORAL
Qty: 90 TABLET | Refills: 1 | Status: SHIPPED | OUTPATIENT
Start: 2022-12-06

## 2022-12-06 NOTE — TELEPHONE ENCOUNTER
Delfin Frias Sr. was contacted by a Leanna Nelson to discuss a referral for SDOH related needs. Writer spoke with: Patient and explained the services and assistance that can be provided through the Leanna Nelson program.     Patient agreeable to receiving resources and support from Shahid Hernandez. Intake Notes:   Patient stated: They moved from [place] to [place] to have dental work done. Had voucher in 2017; [facility] took the voucher away during the pandemic. Working part time and in school studying criminal justice. Seeking housing. Has been homeless before. Patient made appointment with me to fill out SPDAT housing prioritization assessment at 10am, Wednesday, December 14. Patient would like to receive information about the appointment via email:  [email or web address]    Plan of Care: N/A    Action steps to be completed by writer:  I will email patient the date, time, location, and documents needed for the appointment. I will also follow up with [facility] about the housing voucher. Action steps to be completed by patient:  Patient will bring required documents to appointment at 10am on Wednesday, December 14 to fill out the SPDAT.     Patient has given verbal permission to leave detailed messages regarding SDOH referral on their phone: N/A    Patient has given verbal permission to submit applications on their behalf: yes    Patient voiced understanding of action plan and responsibilities, was provided with writer's contact information, and agrees to call should they require additional assistance: yes      Delta Figueroa MA

## 2022-12-08 ENCOUNTER — TELEPHONE (OUTPATIENT)
Dept: INTERNAL MEDICINE | Age: 57
End: 2022-12-08

## 2022-12-08 NOTE — TELEPHONE ENCOUNTER
Writer was requested by Dr. Anant Silverio to reach out to pt d/t pt being homeless currently. SDOH referral was placed, writer noted in chart that contact has been made by Heather Pedro.  Appt is set for 12/14

## 2022-12-22 NOTE — TELEPHONE ENCOUNTER
No additional notes between pt and Patsy Manual re: housing. PC to pt to check in, pt states he is still looking for housing. Pt states he hasn't had additional contact with Jorgito Beltran provided pt with Teressa's new phone number listed in [phone] message on 12/14. Pt states he will reach out to her. Pt also states he may have a house to rent but owner has some things that need fixed first. Pt confirmed he is staying \"from place to place,\" mostly with friends and family, but also states he has slept in his [place] Nan on multiple occasions. Writer asked if pt has someplace safe to stay this weekend, especially with bad weather coming, pt stated he did. Pt mentioned he has been in touch with [employer] program, he is attending a class on 1/21/23. Provided pt with writer's direct number for additional assistance as well, will touch base again with pt soon.

## 2022-12-22 NOTE — TELEPHONE ENCOUNTER
Lulu George Sr. was contacted by a [facility] for follow-up regarding SDOH related needs. Writer spoke with: Patient    Progress Notes: Patient left voicemail this morning, and I'm calling back. Patient stated they are doing \"so-so\". He asked about status of the (SPDAT) application. I faxed the SPDAT to the [employer] [place] [address] [address] on 12/19/22. Unfortunately, processing the SPDAT and placing people with housing is not a quick process. I asked Patient if he has somewhere to stay this weekend during the winter storm. He said he'll probably go to a friend's house or get a motel room. Action steps to be completed by writer:  I will try to find out if there is an estimated time for processing the SPDAT. Action steps to be completed by patient:  Patient will find a warm, safe place to stay during the winter storm.     Patient has given verbal permission to leave detailed messages regarding SDOH referral on their phone: N/A    Patient has given verbal permission to submit applications on their behalf: yes    Kerri Mariee MA

## 2022-12-24 DIAGNOSIS — K21.9 GASTROESOPHAGEAL REFLUX DISEASE WITHOUT ESOPHAGITIS: ICD-10-CM

## 2022-12-27 RX ORDER — OMEPRAZOLE 20 MG/1
CAPSULE, DELAYED RELEASE ORAL
Qty: 30 CAPSULE | Refills: 5 | Status: SHIPPED | OUTPATIENT
Start: 2022-12-27

## 2022-12-27 NOTE — TELEPHONE ENCOUNTER
Request for prilosec.   Next hanny on 3/7/23    Next Visit Date:  Future Appointments   Date Time Provider [place]   2/28/2023  2:00 PM Thomas Holloway MD [facility]   3/7/2023  1:00 PM Jose Caceres MD [facility] [address] Maintenance   Topic Date Due    COVID-19 Vaccine (1) Never done    Low dose CT lung screening  Never done    Flu vaccine (1) Never done    Annual Wellness Visit (AWV)  02/25/2023 (Originally 5/29/2019)    DTaP/Tdap/Td vaccine (1 - Tdap) 03/29/2023 (Originally 11/27/1986)    Shingles vaccine (1 of 2) 03/29/2023 (Originally 11/27/2017)    Depression Screen  11/29/2023    Colorectal Cancer Screen  02/28/2025    Lipids  10/04/2025    Pneumococcal 0-64 years Vaccine  Completed    Hepatitis C screen  Completed    HIV screen  Completed    Hepatitis A vaccine  Aged Out    Hib vaccine  Aged Out    Meningococcal (ACWY) vaccine  Aged Out       Hemoglobin A1C (%)   Date Value   10/19/2021 5.5   10/04/2020 5.8   02/16/2018 5.8             ( goal A1C is < 7)   No results found for: LABMICR  LDL Cholesterol (mg/dL)   Date Value   10/04/2020 95       (goal LDL is <100)   AST (U/L)   Date Value   05/23/2022 21     ALT (U/L)   Date Value   05/23/2022 23     BUN (mg/dL)   Date Value   05/23/2022 17     BP Readings from Last 3 Encounters:   10/11/22 127/69   08/18/22 122/66   06/14/22 130/62          (goal 120/80)    All Future Testing planned in [facility]  Lab Frequency Next Occurrence   COVID-19 Once 02/24/2022   COLONOSCOPY (Diagnostic) Once 02/07/2023         Patient Active Problem List:     Essential hypertension     Sickle cell trait ([facility] [address]     Primary osteoarthritis of knee     Portal vein thrombosis, june 2017, completed course of warfarin      Gastroesophageal reflux disease without esophagitis     Chronic pain of right ankle     Diverticulosis     Polyp of sigmoid colon     Adenomatous polyp of descending colon     Rectal polyp

## 2022-12-28 NOTE — TELEPHONE ENCOUNTER
Request for gabapentin.   Next hanny on 3/7/23    Next Visit Date:  Future Appointments   Date Time Provider Marco A Blancoi   2/28/2023  2:00 PM Gold Arrington MD [facility]   3/7/2023  1:00 PM Ant Claros MD [facility] IM [address] Maintenance   Topic Date Due    COVID-19 Vaccine (1) Never done    Low dose CT lung screening  Never done    Flu vaccine (1) Never done    Annual Wellness Visit (AWV)  02/25/2023 (Originally 5/29/2019)    DTaP/Tdap/Td vaccine (1 - Tdap) 03/29/2023 (Originally 11/27/1986)    Shingles vaccine (1 of 2) 03/29/2023 (Originally 11/27/2017)    Depression Screen  11/29/2023    Colorectal Cancer Screen  02/28/2025    Lipids  10/04/2025    Pneumococcal 0-64 years Vaccine  Completed    Hepatitis C screen  Completed    HIV screen  Completed    Hepatitis A vaccine  Aged Out    Hib vaccine  Aged Out    Meningococcal (ACWY) vaccine  Aged Out       Hemoglobin A1C (%)   Date Value   10/19/2021 5.5   10/04/2020 5.8   02/16/2018 5.8             ( goal A1C is < 7)   No results found for: LABMICR  LDL Cholesterol (mg/dL)   Date Value   10/04/2020 95       (goal LDL is <100)   AST (U/L)   Date Value   05/23/2022 21     ALT (U/L)   Date Value   05/23/2022 23     BUN (mg/dL)   Date Value   05/23/2022 17     BP Readings from Last 3 Encounters:   10/11/22 127/69   08/18/22 122/66   06/14/22 130/62          (goal 120/80)    All Future Testing planned in [facility]  Lab Frequency Next Occurrence   COVID-19 Once 02/24/2022   COLONOSCOPY (Diagnostic) Once 02/07/2023         Patient Active Problem List:     Essential hypertension     Sickle cell trait ([facility] [address]     Primary osteoarthritis of knee     Portal vein thrombosis, june 2017, completed course of warfarin      Gastroesophageal reflux disease without esophagitis     Chronic pain of right ankle     Diverticulosis     Polyp of sigmoid colon     Adenomatous polyp of descending colon     Rectal polyp

## 2022-12-30 RX ORDER — GABAPENTIN 300 MG/1
CAPSULE ORAL
Qty: 90 CAPSULE | Refills: 0 | Status: SHIPPED | OUTPATIENT
Start: 2022-12-30 | End: 2023-01-29

## 2023-01-06 NOTE — TELEPHONE ENCOUNTER
Alfredo Clark Sr. was contacted by jose carlos Nelson for follow-up regarding SDOH related needs. Writer spoke with: Patient    Progress Notes:   I provided Patient with Caden Landis phone number at the [address] [place] [facility]) as a way to follow up on the [address] (housing assessment) we filled out on December 14, 2022. Patient said he had called [facility] and talked with a lady, but she hasn't called him back yet. [facility] and [facility] for [facility] [employer] [place]. Patient quit [employer] job due to health, so income is cut in half. I informed Patient that I'm closing the referral, and I let him know how he can request a new SDOH referral if a different issue comes up. Action steps to be completed by writer:  Closing referral    Action steps to be completed by patient:  Patient will follow up with [facility] Homelessness board and respond to [facility] rental assistance program if they contact him.     Patient has given verbal permission to leave detailed messages regarding SDOH referral on their phone: N/A    Patient has given verbal permission to submit applications on their behalf: yes    Rubin Kelley MA

## 2023-01-19 NOTE — TELEPHONE ENCOUNTER
PC to pt to touch base re: housing. Pt states he has an appt this weekend with [employer] and is also working with the [employer] board\" for assistance. Pt states he is doing ok but is concerned about needing some teeth pulled. Writer is going to [place] and provide pt with list of oral surgeons. Unsure if he will need referral, writer to find out and obtain referral if needed. Pt concerned for infection, states he has an ongoing cough and that some of his teeth are \"rotting out. \"       Addendum 1/20 0963: Writer texted the following information to pt: Only found 2 oral surgeons on insurance website, don't think you need a referral but please call [phone] if you do. [id number] 89 [address]  [address] [address] [address] can also call customer service number on insurance card for more possible providers.

## 2023-01-23 ENCOUNTER — TELEPHONE (OUTPATIENT)
Dept: INTERNAL MEDICINE | Age: 58
End: 2023-01-23

## 2023-01-25 NOTE — TELEPHONE ENCOUNTER
Patient's SPDAT housing assessment has progressed, and a case transfer has been completed with Oneil Forrester to enroll Patient in their rapid rehousing program.  Patient was very appreciative to receive this good news.

## 2023-01-26 NOTE — TELEPHONE ENCOUNTER
Cassy Acevedo Sr. was contacted by Evangelist Disla MA, a [facility], regarding a Social Determinants of Health referral.     A message was left with the writer's contact information. Follow-up attempt.

## 2023-01-31 ENCOUNTER — TELEMEDICINE (OUTPATIENT)
Dept: INTERNAL MEDICINE | Age: 58
End: 2023-01-31
Payer: MEDICARE

## 2023-01-31 DIAGNOSIS — R23.8 SKIN BREAKDOWN: Primary | ICD-10-CM

## 2023-01-31 PROCEDURE — 99441 PR PHYS/QHP TELEPHONE EVALUATION 5-10 MIN: CPT | Performed by: INTERNAL MEDICINE

## 2023-01-31 ASSESSMENT — PATIENT HEALTH QUESTIONNAIRE - PHQ9
SUM OF ALL RESPONSES TO PHQ QUESTIONS 1-9: 0
SUM OF ALL RESPONSES TO PHQ QUESTIONS 1-9: 0
SUM OF ALL RESPONSES TO PHQ9 QUESTIONS 1 & 2: 0
1. LITTLE INTEREST OR PLEASURE IN DOING THINGS: 0
SUM OF ALL RESPONSES TO PHQ QUESTIONS 1-9: 0
SUM OF ALL RESPONSES TO PHQ QUESTIONS 1-9: 0
2. FEELING DOWN, DEPRESSED OR HOPELESS: 0

## 2023-01-31 NOTE — PROGRESS NOTES
Evelyne Wheat Sr. is a 56 y.o. male evaluated via telephone on 1/31/2023 for Cough (Cough is somewhat productive) and Other (Pt concerned about getting sepsis again)  . Documentation:  Nadeem Marin is a 56year old male who has a hx of HTN and GERD both well controlled per patient. He was unable to get barrier cream for skin breakdown around pant line. He is working with social workers to help secure housing and at this time needs oromaxofacial surgeon. He has been trying to get help through dentist.   Diagnosis Orders   1. Skin breakdown  ZINC OXIDE, TOPICAL, 10 % CREA          Total Time: minutes: 5-10 minutes    Evelyne Wheat Sr. was evaluated through a synchronous (real-time) audio encounter. Patient identification was verified at the start of the visit. He (or guardian if applicable) is aware that this is a billable service, which includes applicable co-pays. This visit was conducted with the patient's (and/or legal guardian's) verbal consent. He has not had a related appointment within my department in the past 7 days or scheduled within the next 24 hours. The patient was located at Home: [address] [place] [address]. The provider was located at [facility] (Appt Dept): [address],  [address] [place] [address].     Note: not billable if this call serves to triage the patient into an appointment for the relevant concern    Catalino Callaway MD

## 2023-02-02 ENCOUNTER — TELEPHONE (OUTPATIENT)
Dept: INTERNAL MEDICINE | Age: 58
End: 2023-02-02

## 2023-02-02 NOTE — TELEPHONE ENCOUNTER
Writer called pt to see about his dental appt, pt states he has a appt today at [facility] at 3:30p.

## 2023-02-02 NOTE — TELEPHONE ENCOUNTER
Writer called [facility] and talk with the scheduling about the pt, [facility] going to called the pt and writer also sent Nasima Clarke pt care coordinator  a message to help to schedule appt

## 2023-02-02 NOTE — TELEPHONE ENCOUNTER
Hi, this brittany CMA from Dr. Boby Kaba office, I have a request, can you get in touch with carla zhang and help him schedule appointment with a oral surgeon, pt states he try to schedule appt but couldn't with [facility] [address]. Dr Suzanne Palma want pt to schedule appointment soon.  thanks

## 2023-02-03 RX ORDER — GABAPENTIN 300 MG/1
CAPSULE ORAL
Qty: 90 CAPSULE | Refills: 0 | Status: SHIPPED | OUTPATIENT
Start: 2023-02-03 | End: 2023-03-05

## 2023-02-03 NOTE — TELEPHONE ENCOUNTER
Last visit: 1-31-23  Last Med refill:   Does patient have enough medication for 72 hours: No:     Next Visit Date:  Future Appointments   Date Time Provider Marco A Mary Lou   2/28/2023  2:00 PM Leighton Miranda MD [facility]   3/7/2023  1:00 PM Beto Cooper MD [facility] [address] Maintenance   Topic Date Due    COVID-19 Vaccine (1) Never done    Low dose CT lung screening  Never done    Flu vaccine (1) Never done    Annual Wellness Visit (AWV)  02/25/2023 (Originally 5/29/2019)    DTaP/Tdap/Td vaccine (1 - Tdap) 03/29/2023 (Originally 11/27/1986)    Shingles vaccine (1 of 2) 03/29/2023 (Originally 11/27/2017)    Depression Screen  01/31/2024    Colorectal Cancer Screen  02/28/2025    Lipids  10/04/2025    Pneumococcal 0-64 years Vaccine  Completed    Hepatitis C screen  Completed    HIV screen  Completed    Hepatitis A vaccine  Aged Out    Hib vaccine  Aged Out    Meningococcal (ACWY) vaccine  Aged Out       Hemoglobin A1C (%)   Date Value   10/19/2021 5.5   10/04/2020 5.8   02/16/2018 5.8             ( goal A1C is < 7)   No results found for: LABMICR  LDL Cholesterol (mg/dL)   Date Value   10/04/2020 95   07/22/2016 104       (goal LDL is <100)   AST (U/L)   Date Value   05/23/2022 21     ALT (U/L)   Date Value   05/23/2022 23     BUN (mg/dL)   Date Value   05/23/2022 17     BP Readings from Last 3 Encounters:   10/11/22 127/69   08/18/22 122/66   06/14/22 130/62          (goal 120/80)    All Future Testing planned in [facility]  Lab Frequency Next Occurrence   COVID-19 Once 02/24/2022   COLONOSCOPY (Diagnostic) Once 02/07/2023               Patient Active Problem List:     Essential hypertension     Sickle cell trait ([facility] [address]     Primary osteoarthritis of knee     Portal vein thrombosis, june 2017, completed course of warfarin      Gastroesophageal reflux disease without esophagitis     Chronic pain of right ankle     Diverticulosis     Polyp of sigmoid colon     Adenomatous polyp of descending colon Rectal polyp

## 2023-02-07 ENCOUNTER — CARE COORDINATION (OUTPATIENT)
Dept: CARE COORDINATION | Age: 58
End: 2023-02-07

## 2023-02-07 NOTE — CARE COORDINATION
PCP referral to help patient get appt with oral surgeon. Writer spoke with patient, he realized he needs to see dentist first who will refer him to an oral surgeon if needed. He thinks he needs to have all his teeth pulled and get dentures. He has appt with dentist at [facility] 2/10/23. He was unable to talk long, is currently at [facility] talking with a counselor about housing. Will follow up next week to assess SDOH, review medications, discuss dental appt.     Future Appointments   Date Time Provider Marco A Barreto   2/28/2023  2:00 PM Chinyere Miramontes MD [facility] [place] [id number]   3/7/2023  1:00 PM Sandra Salazar MD [facility] [address]

## 2023-02-10 NOTE — DISCHARGE INSTRUCTIONS

## 2023-02-13 ENCOUNTER — ANESTHESIA EVENT (OUTPATIENT)
Dept: OPERATING ROOM | Age: 58
End: 2023-02-13

## 2023-02-13 NOTE — TELEPHONE ENCOUNTER
I left voicemail for patient with my contact information inquiring about progress with Oneil [address].

## 2023-02-14 NOTE — PROGRESS NOTES
Spoke with pt regarding Endo case here 2-16-23. States he \"forgot about this procedure\" and that he moved and  does not have the colon prep instructions. Call transferred to Dr. Vallejo office for prep instructions.

## 2023-02-15 ENCOUNTER — CARE COORDINATION (OUTPATIENT)
Dept: CARE COORDINATION | Age: 58
End: 2023-02-15

## 2023-02-15 SDOH — ECONOMIC STABILITY: HOUSING INSECURITY: IN THE LAST 12 MONTHS, HOW MANY PLACES HAVE YOU LIVED?: 4

## 2023-02-15 SDOH — HEALTH STABILITY: PHYSICAL HEALTH: ON AVERAGE, HOW MANY DAYS PER WEEK DO YOU ENGAGE IN MODERATE TO STRENUOUS EXERCISE (LIKE A BRISK WALK)?: 3 DAYS

## 2023-02-15 SDOH — ECONOMIC STABILITY: INCOME INSECURITY: IN THE LAST 12 MONTHS, WAS THERE A TIME WHEN YOU WERE NOT ABLE TO PAY THE MORTGAGE OR RENT ON TIME?: YES

## 2023-02-15 SDOH — ECONOMIC STABILITY: HOUSING INSECURITY
IN THE LAST 12 MONTHS, WAS THERE A TIME WHEN YOU DID NOT HAVE A STEADY PLACE TO SLEEP OR SLEPT IN A SHELTER (INCLUDING NOW)?: YES

## 2023-02-15 SDOH — HEALTH STABILITY: PHYSICAL HEALTH: ON AVERAGE, HOW MANY MINUTES DO YOU ENGAGE IN EXERCISE AT THIS LEVEL?: 20 MIN

## 2023-02-15 ASSESSMENT — SOCIAL DETERMINANTS OF HEALTH (SDOH)
HOW OFTEN DO YOU ATTEND CHURCH OR RELIGIOUS SERVICES?: 1 TO 4 TIMES PER YEAR
IN A TYPICAL WEEK, HOW MANY TIMES DO YOU TALK ON THE PHONE WITH FAMILY, FRIENDS, OR NEIGHBORS?: MORE THAN THREE TIMES A WEEK
HOW OFTEN DO YOU ATTENT MEETINGS OF THE CLUB OR ORGANIZATION YOU BELONG TO?: NEVER
HOW OFTEN DO YOU GET TOGETHER WITH FRIENDS OR RELATIVES?: THREE TIMES A WEEK
DO YOU BELONG TO ANY CLUBS OR ORGANIZATIONS SUCH AS CHURCH GROUPS UNIONS, FRATERNAL OR ATHLETIC GROUPS, OR SCHOOL GROUPS?: NO

## 2023-02-15 ASSESSMENT — LIFESTYLE VARIABLES
HOW MANY STANDARD DRINKS CONTAINING ALCOHOL DO YOU HAVE ON A TYPICAL DAY: 1 OR 2
HOW OFTEN DO YOU HAVE A DRINK CONTAINING ALCOHOL: 2-4 TIMES A MONTH

## 2023-02-15 NOTE — TELEPHONE ENCOUNTER
I spoke with Patient. He met with  at [facility] [place]. She is trying to find him housing for his approved \"placement\".    Next meeting with  is Monday, February 20 at [address] [facility].

## 2023-02-15 NOTE — CARE COORDINATION
Ambulatory Care Coordination Note  2/15/2023    Patient Current Location: [facility]     ACM contacted the patient by telephone. Verified name and  with patient as identifiers. Provided introduction to self, and explanation of the ACM role. SUMMARY:   -Spoke with patient. He went to dentist but they only took x rays, talked about needing to get teeth extracted but even with insurance, his portion would be $3500 which he cannot afford. They didn't address any problems/infections. He was very angry about the visit. He will call his insurance to discuss but right now no plans to go back. No problems eating.  -He is looking for housing, currently moving around staying with family or friends or stays in his truck. He works, has a car and drives. He would get rent assistance when he finds a place. Leanna Nelson working with him, encouraged him to call her with updates. -Going to school for criminal justice, stressful since also working, looking for housing. Admits smokes marijuana for stress relief. No other drugs, drinks alcohol occasionally. Still smokes 1 ppd, plans to buy patches soon to cut down or eventually quit.  -Has colonoscopy next week. Prior colonoscopy a year ago- had 15 polyps removed.  -No other physical complaints right now. He is outside walking, was hard to hear him on the phone. CC Plan:   -Will follow in a few weeks to check on colonoscopy, findings of speaking with insurance about dental issues.   General Assessment    Do you have any symptoms that are causing concern?: No              Offered patient enrollment in the Remote Patient Monitoring (RPM) program for in-home monitoring: Patient is not eligible for RPM program.    Lab Results       None            Care Coordination Interventions    Referral from Primary Care Provider: Yes  Suggested Interventions and Community Resources          Goals Addressed    None         Future Appointments   Date Time Provider Marco A Barreto 2/28/2023  2:00 PM Jamila Arizmendi MD [facility] [place] [address]   3/7/2023  1:00 PM Randy Ramirez MD [facility] IM [address] [facility]

## 2023-02-16 ENCOUNTER — ANESTHESIA (OUTPATIENT)
Dept: OPERATING ROOM | Age: 58
End: 2023-02-16

## 2023-02-21 ENCOUNTER — HOSPITAL ENCOUNTER (OUTPATIENT)
Age: 58
Setting detail: OUTPATIENT SURGERY
Discharge: HOME | End: 2023-02-21
Attending: INTERNAL MEDICINE | Admitting: INTERNAL MEDICINE
Payer: MEDICARE

## 2023-02-21 VITALS
HEIGHT: 69 IN | BODY MASS INDEX: 29.36 KG/M2 | DIASTOLIC BLOOD PRESSURE: 71 MMHG | SYSTOLIC BLOOD PRESSURE: 136 MMHG | OXYGEN SATURATION: 99 % | TEMPERATURE: 98.4 F | RESPIRATION RATE: 18 BRPM | HEART RATE: 56 BPM

## 2023-02-21 PROBLEM — Z86.0100 HISTORY OF COLON POLYPS: Status: ACTIVE | Noted: 2023-02-21

## 2023-02-21 PROBLEM — Z86.010 HISTORY OF COLON POLYPS: Status: ACTIVE | Noted: 2023-02-21

## 2023-02-21 PROCEDURE — 2709999900 HC NON-CHARGEABLE SUPPLY: Performed by: INTERNAL MEDICINE

## 2023-02-21 PROCEDURE — 7100000010 HC PHASE II RECOVERY - FIRST 15 MIN: Performed by: INTERNAL MEDICINE

## 2023-02-21 PROCEDURE — 3700000000 HC ANESTHESIA ATTENDED CARE: Performed by: INTERNAL MEDICINE

## 2023-02-21 PROCEDURE — 2500000003 HC RX 250 WO HCPCS

## 2023-02-21 PROCEDURE — 3700000001 HC ADD 15 MINUTES (ANESTHESIA): Performed by: INTERNAL MEDICINE

## 2023-02-21 PROCEDURE — G0105 COLORECTAL SCRN; HI RISK IND: HCPCS | Performed by: INTERNAL MEDICINE

## 2023-02-21 PROCEDURE — 7100000011 HC PHASE II RECOVERY - ADDTL 15 MIN: Performed by: INTERNAL MEDICINE

## 2023-02-21 PROCEDURE — 6360000002 HC RX W HCPCS

## 2023-02-21 PROCEDURE — 3609027000 HC COLONOSCOPY: Performed by: INTERNAL MEDICINE

## 2023-02-21 PROCEDURE — 2580000003 HC RX 258: Performed by: ANESTHESIOLOGY

## 2023-02-21 RX ORDER — FENTANYL CITRATE 50 UG/ML
25 INJECTION, SOLUTION INTRAMUSCULAR; INTRAVENOUS
Status: DISCONTINUED | OUTPATIENT
Start: 2023-02-21 | End: 2023-02-21 | Stop reason: HOSPADM

## 2023-02-21 RX ORDER — FENTANYL CITRATE 50 UG/ML
50 INJECTION, SOLUTION INTRAMUSCULAR; INTRAVENOUS
Status: DISCONTINUED | OUTPATIENT
Start: 2023-02-21 | End: 2023-02-21 | Stop reason: HOSPADM

## 2023-02-21 RX ORDER — SODIUM CHLORIDE 9 MG/ML
INJECTION, SOLUTION INTRAVENOUS PRN
Status: CANCELLED | OUTPATIENT
Start: 2023-02-21

## 2023-02-21 RX ORDER — SODIUM CHLORIDE, SODIUM LACTATE, POTASSIUM CHLORIDE, CALCIUM CHLORIDE 600; 310; 30; 20 MG/100ML; MG/100ML; MG/100ML; MG/100ML
INJECTION, SOLUTION INTRAVENOUS CONTINUOUS
Status: DISCONTINUED | OUTPATIENT
Start: 2023-02-21 | End: 2023-02-21 | Stop reason: HOSPADM

## 2023-02-21 RX ORDER — DIPHENHYDRAMINE HYDROCHLORIDE 50 MG/ML
12.5 INJECTION INTRAMUSCULAR; INTRAVENOUS
Status: CANCELLED | OUTPATIENT
Start: 2023-02-21 | End: 2023-02-22

## 2023-02-21 RX ORDER — SODIUM CHLORIDE 0.9 % (FLUSH) 0.9 %
5-40 SYRINGE (ML) INJECTION PRN
Status: DISCONTINUED | OUTPATIENT
Start: 2023-02-21 | End: 2023-02-21 | Stop reason: HOSPADM

## 2023-02-21 RX ORDER — SODIUM CHLORIDE 0.9 % (FLUSH) 0.9 %
5-40 SYRINGE (ML) INJECTION EVERY 12 HOURS SCHEDULED
Status: DISCONTINUED | OUTPATIENT
Start: 2023-02-21 | End: 2023-02-21 | Stop reason: HOSPADM

## 2023-02-21 RX ORDER — FENTANYL CITRATE 50 UG/ML
50 INJECTION, SOLUTION INTRAMUSCULAR; INTRAVENOUS EVERY 5 MIN PRN
Status: CANCELLED | OUTPATIENT
Start: 2023-02-21

## 2023-02-21 RX ORDER — SODIUM CHLORIDE 0.9 % (FLUSH) 0.9 %
5-40 SYRINGE (ML) INJECTION EVERY 12 HOURS SCHEDULED
Status: CANCELLED | OUTPATIENT
Start: 2023-02-21

## 2023-02-21 RX ORDER — SODIUM CHLORIDE 9 MG/ML
INJECTION, SOLUTION INTRAVENOUS PRN
Status: DISCONTINUED | OUTPATIENT
Start: 2023-02-21 | End: 2023-02-21 | Stop reason: HOSPADM

## 2023-02-21 RX ORDER — ONDANSETRON 2 MG/ML
4 INJECTION INTRAMUSCULAR; INTRAVENOUS
Status: CANCELLED | OUTPATIENT
Start: 2023-02-21 | End: 2023-02-22

## 2023-02-21 RX ORDER — FENTANYL CITRATE 50 UG/ML
25 INJECTION, SOLUTION INTRAMUSCULAR; INTRAVENOUS EVERY 5 MIN PRN
Status: CANCELLED | OUTPATIENT
Start: 2023-02-21

## 2023-02-21 RX ORDER — LIDOCAINE HYDROCHLORIDE 10 MG/ML
1 INJECTION, SOLUTION EPIDURAL; INFILTRATION; INTRACAUDAL; PERINEURAL
Status: DISCONTINUED | OUTPATIENT
Start: 2023-02-21 | End: 2023-02-21 | Stop reason: HOSPADM

## 2023-02-21 RX ORDER — LIDOCAINE HYDROCHLORIDE 10 MG/ML
INJECTION, SOLUTION INFILTRATION; PERINEURAL PRN
Status: DISCONTINUED | OUTPATIENT
Start: 2023-02-21 | End: 2023-02-21 | Stop reason: SDUPTHER

## 2023-02-21 RX ORDER — PROPOFOL 10 MG/ML
INJECTION, EMULSION INTRAVENOUS PRN
Status: DISCONTINUED | OUTPATIENT
Start: 2023-02-21 | End: 2023-02-21 | Stop reason: SDUPTHER

## 2023-02-21 RX ORDER — MIDAZOLAM HYDROCHLORIDE 2 MG/2ML
1 INJECTION, SOLUTION INTRAMUSCULAR; INTRAVENOUS EVERY 10 MIN PRN
Status: DISCONTINUED | OUTPATIENT
Start: 2023-02-21 | End: 2023-02-21 | Stop reason: HOSPADM

## 2023-02-21 RX ORDER — SODIUM CHLORIDE 0.9 % (FLUSH) 0.9 %
5-40 SYRINGE (ML) INJECTION PRN
Status: CANCELLED | OUTPATIENT
Start: 2023-02-21

## 2023-02-21 RX ADMIN — SODIUM CHLORIDE, POTASSIUM CHLORIDE, SODIUM LACTATE AND CALCIUM CHLORIDE: 600; 310; 30; 20 INJECTION, SOLUTION INTRAVENOUS at 12:14

## 2023-02-21 RX ADMIN — PROPOFOL 360 MG: 10 INJECTION, EMULSION INTRAVENOUS at 12:22

## 2023-02-21 RX ADMIN — LIDOCAINE HYDROCHLORIDE 40 MG: 10 INJECTION, SOLUTION INFILTRATION; PERINEURAL at 12:22

## 2023-02-21 ASSESSMENT — LIFESTYLE VARIABLES: SMOKING_STATUS: 1

## 2023-02-21 ASSESSMENT — PAIN - FUNCTIONAL ASSESSMENT: PAIN_FUNCTIONAL_ASSESSMENT: 0-10

## 2023-02-21 NOTE — OP NOTE
Operative Note      Patient: Ochoa Garza Sr. YOB: 1965  MRN: [id number]    Date of Procedure: 2/21/2023    Pre-Op Diagnosis: Adenomatous polyp of sigmoid colon [D12.5]    Post-Op Diagnosis: POOR PREP, diverticulosis. Limited exam       Procedure(s):  COLONOSCOPY DIAGNOSTIC- poor prep    Surgeon(s):  Anabella Knowles MD    Assistant:   * No surgical staff found *    Anesthesia: Monitor Anesthesia Care    Estimated Blood Loss (mL): Minimal    Complications: None    Specimens:   * No specimens in log *    Implants:  * No implants in log *      Drains: * No LDAs found *              [facility] [place] [facility]     COLONOSCOPY    PROCEDURE DATE: 02/21/23    REFERRING PHYSICIAN: No ref. provider found     PRIMARY CARE PROVIDER: Cherise Omer MD    ATTENDING PHYSICIAN: Anabella Knowles MD     HISTORY: Mr. Neto Polk is a 56 y.o. male who presents to the  endoscopy unit for colonoscopy. The patient's clinical history is remarkable for history of multiple polyps, referred for repeat colonoscopy. He is currently medically stable and appropriate for the planned procedure. PREOPERATIVE DIAGNOSIS: previous adenomatous polyp. PROCEDURES:   Transanal Colonoscopy --diagnostic. POSTPROCEDURE DIAGNOSIS:    POOR PREP-limited exam    1) Left sided diverticulosis  2) moderate external hemorrhoids    MEDICATIONS:     MAC per anesthesia     EBL <10cc      INSTRUMENT: Olympus CF-H190 AL Pediatric flexible Colonoscope. PREPARATION: The nature and character of the procedure as well as risks, benefits, and alternatives were discussed with the patient and informed consent was obtained. Complications were said to include, but were not limited to: medication allergy, medication reaction, cardiovascular and respiratory problems, bleeding, perforation, infection, and/or missed diagnosis.  Following arrival in the endoscopy room, the patient was placed in the left lateral decubitus position and final time-out accomplished in the presence of the nursing staff. Baseline vital signs were obtained and reviewed, and IV sedation was subsequently initiated. FINDINGS: Rectal examination demonstrated no significant visible external abnormality and digital palpation was unremarkable. Following adequate conscious sedation the colonoscope was introduced and advanced under direct visualization to the cecum, which was identified by the ileocecal valve and appendiceal orifice. The bowel preparation was felt to be POOR. This included copious amounts of thick stool that was mostly able to be adequately irrigated and aspirated. Cecal intubation time was 8 minutes. Once maximally inserted, the endoscope was withdrawn and the mucosa was carefully inspected. The mucosal exam revealed poor prep. Retroflexion was performed in the rectum and hemorrhoids. Withdrawal time was 14 minutes. IMPRESSION:       POOR PREP-limited exam    1) Left sided diverticulosis  2) moderate external hemorrhoids    RECOMMENDATIONS:   1) Follow up with referring provider, as previously scheduled. 2) Repeat Colonoscopy in 6 months or less, needs extended bowel prep. [address] [facility]  Gastroenterology   02/21/23    This note is created with the assistance of a speech recognition program.  While intending to generate a document that actually reflects the content of the visit, the document can still have some errors including those of syntax and sound a like substitutions which may escape proof reading. It such instances, actual meaning can be extrapolated by contextual diversion. The patient was counseled at length about the risks of jonathan Covid-19 during their perioperative period and any recovery window from their procedure. The patient was made aware that jonathan Covid-19  may worsen their prognosis for recovering from their procedure  and lend to a higher morbidity and/or mortality risk.   All material risks, benefits, and reasonable alternatives including postponing the procedure were discussed. The patient DOES wish to proceed with the procedure at this time.      Electronically signed by Jade Weaver MD on 2/21/2023 at 12:41 PM

## 2023-02-21 NOTE — ANESTHESIA PRE PROCEDURE
Department of Anesthesiology  Preprocedure Note       Name:  Cathy Scott Sr.   Age:  56 y.o.  :  1965                                          MRN:  [id number]         Date:  2023      Surgeon: Armando Salamanca):  Surinder Hoang MD    Procedure: Procedure(s):  COLONOSCOPY DIAGNOSTIC    Medications prior to admission:   Prior to Admission medications    Medication Sig Start Date End Date Taking? Authorizing Provider   polyethylene glycol (GLYCOLAX) 17 GM/SCOOP powder Take as directed for bowel prep 23   Surinder Hoang MD   bisacodyl 5 MG EC tablet Take as directed for bowel prep 23   Surinder Hoang MD   gabapentin (NEURONTIN) 300 MG capsule take 1 capsule by mouth three times a day 2/3/23 3/5/23  Susie Townsend MD   ZINC OXIDE, TOPICAL, 10 % CREA Apply to area as needed 3 times a day.  23   Susie Townsend MD   omeprazole (PRILOSEC) 20 MG delayed release capsule take 1 capsule by mouth once daily 22   Susie Townsend MD   metoprolol tartrate (LOPRESSOR) 25 MG tablet take 1 tablet by mouth twice a day  Patient taking differently: Take 25 mg by mouth daily 22   Susie Townsend MD   losartan-hydroCHLOROthiazide (HYZAAR) 50-12.5 MG per tablet take 1 tablet by mouth once daily 22   Susie Townsend MD   ibuprofen (ADVIL;MOTRIN) 800 MG tablet Take 1 tablet by mouth every 8 hours as needed for Pain With food 22   Susie Townsend MD   sildenafil (VIAGRA) 50 MG tablet Take 1 tablet by mouth as needed for Erectile Dysfunction 22   Susie Townsend MD   Zinc Oxide (DR SMITHS ADULT BARRIER) 10 % AERO Use on area around belt buckle to help skin breakdown, 2-3 times a day  Patient not taking: Reported on 22   Susie Townsend MD   clotrimazole-betamethasone (LOTRISONE) 1-0.05 % cream apply to affected area twice a day  Patient not taking: Reported on 2023   Susie Townsend MD   folic acid (FOLVITE) 1 MG tablet Take 1 tablet by mouth daily 22   Susie Townsend MD   vitamin D3 (CHOLECALCIFEROL) 25 MCG (1000 UT) TABS tablet Take 1 tablet by mouth daily 4/26/22   Catalino Callaway MD   thiamine 100 MG tablet Take 1 tablet by mouth daily 4/26/22   Catalino Callaway MD   aspirin (RA ASPIRIN EC ADULT LOW ST) 81 MG EC tablet Take 1 tablet by mouth daily 4/26/22   Catalino Callaway MD   Omega-3 Fatty Acids (FISH OIL) 1000 MG CAPS Take 500 mg by mouth daily     Historical Provider, MD   Multiple Vitamins-Minerals (ONE DAILY ADULTS 50+ PO) Take by mouth    Historical Provider, MD       Current medications:    No current outpatient medications on file. No current facility-administered medications for this visit. Allergies:  No Known Allergies    Problem List:    Patient Active Problem List   Diagnosis Code    Essential hypertension I10    Sickle cell trait ([facility] [address] D57.3    Primary osteoarthritis of knee M17.10    Portal vein thrombosis, june 2017, completed course of warfarin  I81    Gastroesophageal reflux disease without esophagitis K21.9    Chronic pain of right ankle M25.571, G89.29    Diverticulosis K57.90    Polyp of sigmoid colon K63.5    Adenomatous polyp of descending colon D12.4    Rectal polyp K62.1       Past Medical History:        Diagnosis Date    ADHD (attention deficit hyperactivity disorder)     Gastroesophageal reflux disease without esophagitis 2/16/2018    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 Mild pan-diverticulosis was noted. A 13 mm Makenzie IIa lesion was noted in transverse colon. Lifting was achieved by submucosal injection of saline. The polyp was resected using hot snare. Two hemoclips were placed to close mucosal defect.  A 4 mm polyp was noted in descending colon- removed by cold biopsy    COLONOSCOPY  12/17/2021    COLORECTAL CANCER SCREENING, NOT HIGH RISK     COLONOSCOPY N/A 12/17/2021    COLORECTAL CANCER SCREENING, NOT HIGH RISK performed by Jeff Kilgore DO at [place] [address]  2/28/2022    COLONOSCOPY W/ ENDOSCOPIC MUCOSAL RESECTION performed by Jacques German MD at [facility]    SC COLONOSCOPY W/BIOPSY SINGLE/MULTIPLE N/A 2/20/2018    COLONOSCOPY WITH BIOPSY performed by Glenwood Frankel, MD at [address] [facility] History:    Social History     Tobacco Use    Smoking status: Every Day     Packs/day: 1.00     Years: 36.00     Pack years: 36.00     Types: Cigarettes     Start date: 11/30/1985    Smokeless tobacco: Never   Substance Use Topics    Alcohol use: Yes     Types: 1 - 2 Cans of beer per week     Comment: occassional                                Ready to quit: Not Answered  Counseling given: Not Answered      Vital Signs (Current): There were no vitals filed for this visit.                                            BP Readings from Last 3 Encounters:   10/11/22 127/69   08/18/22 122/66   06/14/22 130/62       NPO Status:                                                                                 BMI:   Wt Readings from Last 3 Encounters:   10/11/22 198 lb 12.8 oz (90.2 kg)   08/18/22 196 lb (88.9 kg)   06/14/22 196 lb 3.2 oz (89 kg)     There is no height or weight on file to calculate BMI.    CBC:   Lab Results   Component Value Date/Time    WBC 9.4 05/23/2022 09:12 AM    RBC 6.19 05/23/2022 09:12 AM    HGB 18.0 05/23/2022 09:12 AM    HCT 52.2 05/23/2022 09:12 AM    MCV 84.3 05/23/2022 09:12 AM    RDW 12.9 05/23/2022 09:12 AM     05/23/2022 09:12 AM       CMP:   Lab Results   Component Value Date/Time     05/23/2022 09:12 AM    K 3.7 05/23/2022 09:12 AM    CL 99 05/23/2022 09:12 AM    CO2 23 05/23/2022 09:12 AM    BUN 17 05/23/2022 09:12 AM    CREATININE 1.01 05/23/2022 09:12 AM    GFRAA >60 05/23/2022 09:12 AM    LABGLOM >60 05/23/2022 09:12 AM    GLUCOSE 90 05/23/2022 09:12 AM    GLUCOSE 132 05/08/2012 09:41 AM    PROT 7.9 05/23/2022 09:12 AM    CALCIUM 9.5 05/23/2022 09:12 AM    BILITOT 0.40 05/23/2022 09:12 AM    ALKPHOS 100 05/23/2022 09:12 AM    AST 21 05/23/2022 09:12 AM    ALT 23 05/23/2022 09:12 AM       POC Tests: No results for input(s): POCGLU, POCNA, POCK, POCCL, POCBUN, POCHEMO, POCHCT in the last 72 hours. Coags:   Lab Results   Component Value Date/Time    PROTIME 10.2 05/23/2022 09:12 AM    INR 0.9 05/23/2022 09:12 AM       HCG (If Applicable): No results found for: PREGTESTUR, PREGSERUM, HCG, HCGQUANT     ABGs: No results found for: PHART, PO2ART, XIM9AFA, SDM3HSR, BEART, Y3FQITDH     Type & Screen (If Applicable):  No results found for: LABABO, LABRH    Drug/Infectious Status (If Applicable):  Lab Results   Component Value Date/Time    HEPCAB NONREACTIVE 05/23/2022 09:12 AM       COVID-19 Screening (If Applicable):   Lab Results   Component Value Date/Time    COVID19 Not Detected 02/24/2022 09:16 PM           Anesthesia Evaluation  Patient summary reviewed and Nursing notes reviewed  Airway: Mallampati: III  TM distance: >3 FB   Neck ROM: full  Mouth opening: > = 3 FB   Dental:      Comment: -MISSING SOME UPPER AND LOWER TEETH    Pulmonary:normal exam    (+) COPD:  current smoker                          ROS comment: -SMOKES 1 PPD FOR 37 YEARS  -PRODUCTIVE COUGH   Cardiovascular:    (+) hypertension:,                   Neuro/Psych:   Negative Neuro/Psych ROS  (+) psychiatric history:            GI/Hepatic/Renal:   (+) GERD: well controlled, liver disease:,           Endo/Other:    (+) : arthritis:., .                  ROS comment: Sickle cell trait (HCC) Abdominal:             Vascular:           ROS comment: Portal vein thrombosis, june 2017, completed course of warfarin . Other Findings:             Anesthesia Plan      MAC     ASA 3       Induction: intravenous. Anesthetic plan and risks discussed with patient. Plan discussed with CRNA.     Attending anesthesiologist reviewed and agrees with Preprocedure content                Michael Padilla MD   2/21/2023

## 2023-02-21 NOTE — H&P
Procedure History and Physical    Pre-Procedural Diagnosis:  Surveillance colonoscopy    Indications:  same    Procedure Planned: colonoscopy     History Obtained From:  patient    HISTORY OF PRESENT ILLNESS:       The patient is a 56 y.o. male who presents for the above procedure. Past Medical History:    Past Medical History:   Diagnosis Date    ADHD (attention deficit hyperactivity disorder)     Gastroesophageal reflux disease without esophagitis 2/16/2018    Hepatic vein thrombosis ([address] 6/28/2017    Hypertension     Osteoarthritis        Past Surgical History:    Past Surgical History:   Procedure Laterality Date    ACHILLES TENDON SURGERY  2010    right foot    COLONOSCOPY  02/20/2018    The mucosal exam was normal. Mild pan-diverticulosis was noted. A 13 mm Makenzie IIa lesion was noted in transverse colon. Lifting was achieved by submucosal injection of saline. The polyp was resected using hot snare. Two hemoclips were placed to close mucosal defect. A 4 mm polyp was noted in descending colon- removed by cold biopsy    COLONOSCOPY  12/17/2021    COLORECTAL CANCER SCREENING, NOT HIGH RISK     COLONOSCOPY N/A 12/17/2021    COLORECTAL CANCER SCREENING, NOT HIGH RISK performed by Bianca Calvo DO at [address] [place] [address]  2/28/2022    COLONOSCOPY W/ ENDOSCOPIC MUCOSAL RESECTION performed by Connie Nunn MD at [facility]    HI COLONOSCOPY W/BIOPSY SINGLE/MULTIPLE N/A 2/20/2018    COLONOSCOPY WITH BIOPSY performed by Vicente Lucas MD at [address] [place] [address]       Medications:  No current facility-administered medications for this encounter.        Allergies:   No Known Allergies              Social   Social History     Tobacco Use    Smoking status: Every Day     Packs/day: 1.00     Years: 36.00     Pack years: 36.00     Types: Cigarettes     Start date: 11/30/1985    Smokeless tobacco: Never   Substance Use Topics    Alcohol use: Yes     Types: 1 - 2 Cans of beer per week     Comment: occassional        PSYCH HISTORY:  Depression No  Anxiety No  Suicide No       Family History   Problem Relation Age of Onset    Stroke Mother       No family history of colon cancer, Crohn's disease, or ulcerative colitis    Problems with Sedation/Anesthesia in the past? no    REVIEW OF SYSTEMS:  12 point review of systems negative other than mentioned above. PHYSICAL EXAM:    Vitals: There were no vitals taken for this visit. Focused Exam related to procedure:    General appearance: NAD, conversant   Eyes: anicteric sclerae, moist conjunctivae; no lid-lag; PERRLA   Lungs: CTA, with normal respiratory effort and no intercostal retractions   CV: RRR, no MRGs   Abdomen: Soft, non-tender; no masses or HSM   Skin: Normal temperature, turgor and texture; no rash, ulcers or subcutaneous nodules     DATA:  CBC:   Lab Results   Component Value Date    WBC 9.4 05/23/2022    HGB 18.0 (H) 05/23/2022    HCT 52.2 (H) 05/23/2022    MCV 84.3 05/23/2022     05/23/2022     BUN/Cr:   Lab Results   Component Value Date    BUN 17 05/23/2022   ,   Lab Results   Component Value Date    CREATININE 1.01 05/23/2022     Potassium:   Lab Results   Component Value Date    K 3.7 05/23/2022     PT/INR:   Lab Results   Component Value Date    INR 0.9 05/23/2022    INR 2.49 (H) 10/13/2017    INR 2.16 (H) 08/25/2017    PROTIME 10.2 05/23/2022    PROTIME 27.1 (H) 10/13/2017    PROTIME 23.5 (H) 08/25/2017       ASSESSMENT AND PLAN:       1. Patient is a 56 y.o. male with above specified procedure planned. Expected Sedation/Anesthesia Type: MAC    2. ASA ([address] Anesthesiology) Anesthesia Status: Class 2 - A normal healthy patient with mild systemic disease    3. Mallampati: II (soft palate, uvula, fauces visible)  4. Procedure options, risks and benefits reviewed with Patient. Patient expresses understanding.     5.  Consent has been signed:  Yes    Jose Eaton MD

## 2023-02-21 NOTE — ANESTHESIA POSTPROCEDURE EVALUATION
Department of Anesthesiology  Postprocedure Note    Patient: Roland Aldrich Sr. MRN: [id number]  YOB: 1965  Date of evaluation: 2/21/2023      Procedure Summary     Date: 02/21/23 Room / Location: [place] [address] / [facility]    Anesthesia Start: 8782 Anesthesia Stop: 7900    Procedure: COLONOSCOPY DIAGNOSTIC- poor prep Diagnosis:       Adenomatous polyp of sigmoid colon      (Adenomatous polyp of sigmoid colon [D12.5])    Surgeons: Nam Gardner MD Responsible Provider: Svetlana Arellano MD    Anesthesia Type: MAC ASA Status: 3          Anesthesia Type: No value filed.     Cale Phase I: Cale Score: 10    Cale Phase II:        Anesthesia Post Evaluation    Patient location during evaluation: PACU  Patient participation: complete - patient participated  Level of consciousness: awake  Pain score: 1  Airway patency: patent  Nausea & Vomiting: no nausea and no vomiting  Complications: no  Cardiovascular status: blood pressure returned to baseline and hemodynamically stable  Respiratory status: acceptable  Hydration status: euvolemic

## 2023-02-28 ENCOUNTER — TELEPHONE (OUTPATIENT)
Dept: GASTROENTEROLOGY | Age: 58
End: 2023-02-28

## 2023-02-28 ENCOUNTER — OFFICE VISIT (OUTPATIENT)
Dept: GASTROENTEROLOGY | Age: 58
End: 2023-02-28
Payer: MEDICARE

## 2023-02-28 VITALS
SYSTOLIC BLOOD PRESSURE: 126 MMHG | HEIGHT: 69 IN | BODY MASS INDEX: 27.85 KG/M2 | DIASTOLIC BLOOD PRESSURE: 66 MMHG | WEIGHT: 188 LBS

## 2023-02-28 DIAGNOSIS — K57.90 DIVERTICULOSIS: ICD-10-CM

## 2023-02-28 DIAGNOSIS — D12.5 ADENOMATOUS POLYP OF SIGMOID COLON: ICD-10-CM

## 2023-02-28 DIAGNOSIS — D12.4 ADENOMATOUS POLYP OF DESCENDING COLON: Primary | ICD-10-CM

## 2023-02-28 PROCEDURE — 99214 OFFICE O/P EST MOD 30 MIN: CPT | Performed by: INTERNAL MEDICINE

## 2023-02-28 PROCEDURE — 3074F SYST BP LT 130 MM HG: CPT | Performed by: INTERNAL MEDICINE

## 2023-02-28 PROCEDURE — 4004F PT TOBACCO SCREEN RCVD TLK: CPT | Performed by: INTERNAL MEDICINE

## 2023-02-28 PROCEDURE — G8428 CUR MEDS NOT DOCUMENT: HCPCS | Performed by: INTERNAL MEDICINE

## 2023-02-28 PROCEDURE — G8484 FLU IMMUNIZE NO ADMIN: HCPCS | Performed by: INTERNAL MEDICINE

## 2023-02-28 PROCEDURE — G8419 CALC BMI OUT NRM PARAM NOF/U: HCPCS | Performed by: INTERNAL MEDICINE

## 2023-02-28 PROCEDURE — 3017F COLORECTAL CA SCREEN DOC REV: CPT | Performed by: INTERNAL MEDICINE

## 2023-02-28 PROCEDURE — 3078F DIAST BP <80 MM HG: CPT | Performed by: INTERNAL MEDICINE

## 2023-02-28 RX ORDER — BISACODYL 5 MG
TABLET, DELAYED RELEASE (ENTERIC COATED) ORAL
Qty: 4 TABLET | Refills: 0 | Status: SHIPPED | OUTPATIENT
Start: 2023-02-28

## 2023-02-28 RX ORDER — POLYETHYLENE GLYCOL 3350 17 G/17G
POWDER, FOR SOLUTION ORAL
Qty: 2 EACH | Refills: 0 | Status: SHIPPED | OUTPATIENT
Start: 2023-02-28

## 2023-02-28 RX ORDER — POLYETHYLENE GLYCOL 3350 17 G/17G
POWDER, FOR SOLUTION ORAL
Qty: 238 G | Refills: 0 | Status: SHIPPED | OUTPATIENT
Start: 2023-02-28

## 2023-02-28 ASSESSMENT — ENCOUNTER SYMPTOMS
SHORTNESS OF BREATH: 0
ANAL BLEEDING: 0
BLOOD IN STOOL: 0
NAUSEA: 0
CHOKING: 0
CONSTIPATION: 0
RECTAL PAIN: 0
SORE THROAT: 0
TROUBLE SWALLOWING: 0
ABDOMINAL PAIN: 0
DIARRHEA: 0
ABDOMINAL DISTENTION: 0
COUGH: 0
VOMITING: 0
CHEST TIGHTNESS: 0

## 2023-02-28 NOTE — PROGRESS NOTES
GI FOLLOW UP    INTERVAL HISTORY:       No referring provider defined for this encounter. Chief Complaint   Patient presents with    Follow Up After Procedure     DR[facility] 2/21. Repeat < 6m.         1. Adenomatous polyp of descending colon    2. Diverticulosis    3. Adenomatous polyp of sigmoid colon            HISTORY OF PRESENT ILLNESS:Mr. Jose Watts Sr. is a 49 y.o. male with a past history remarkable for HVT, referred for evaluation of endoscopic removal of advanced polyps. Patient is an active smoker and social drinker. Uses marijuana. Underwent a recent screening colonoscopy the patient was identified to have several polyps approximately 14 total.  Some were observed to be greater than 10 mm which are categorized into advance polyps. Patient referred to gastroenterology for endoscopic removal possible EMR of residual polyps. Smoker: Active 1 ppd x 25 yrs   Drinking history: Socially  Illicit drugs: Marijuana. Abdominal surgeries:None   Prior Colonoscopy: 12/2021   Prior EGD: None   FH of GI issues: Kidney Ca- sister. Past Medical,Family, and Social History reviewed and does contribute to the patient presenting condition. Patient's PMH/PSH,SH,PSYCH Hx, MEDs, ALLERGIES, and ROS were all reviewed and updated in the appropriate sections. PAST MEDICAL HISTORY:  Past Medical History:   Diagnosis Date    ADHD (attention deficit hyperactivity disorder)     Gastroesophageal reflux disease without esophagitis 2/16/2018    Hepatic vein thrombosis ([address] 6/28/2017    Hypertension     Osteoarthritis        Past Surgical History:   Procedure Laterality Date    ACHILLES TENDON SURGERY  2010    right foot    COLONOSCOPY  02/20/2018    The mucosal exam was normal. Mild pan-diverticulosis was noted. A 13 mm Makenzie IIa lesion was noted in transverse colon.  Lifting was achieved by submucosal injection of saline. The polyp was resected using hot snare. Two hemoclips were placed to close mucosal defect.  A 4 mm polyp was noted in descending colon- removed by cold biopsy    COLONOSCOPY  12/17/2021    COLORECTAL CANCER SCREENING, NOT HIGH RISK     COLONOSCOPY N/A 12/17/2021    COLORECTAL CANCER SCREENING, NOT HIGH RISK performed by Jadon Richardson DO at [address] [place] [address]  02/28/2022    COLONOSCOPY W/ ENDOSCOPIC MUCOSAL RESECTION performed by Benja Ladd MD at [facility]    COLONOSCOPY N/A 02/21/2023    COLONOSCOPY DIAGNOSTIC- poor prep    COLONOSCOPY N/A 2/21/2023    COLONOSCOPY DIAGNOSTIC- poor prep performed by Benja Ladd MD at [facility] [address] [facility] COLONOSCOPY W/BIOPSY SINGLE/MULTIPLE N/A 02/20/2018    COLONOSCOPY WITH BIOPSY performed by Charlynne Eisenmenger, MD at [address] [place] [address] Ih-10:    Current Outpatient Medications:     gabapentin (NEURONTIN) 300 MG capsule, take 1 capsule by mouth three times a day, Disp: 90 capsule, Rfl: 0    ZINC OXIDE, TOPICAL, 10 % CREA, Apply to area as needed 3 times a day., Disp: 1 each, Rfl: 5    omeprazole (PRILOSEC) 20 MG delayed release capsule, take 1 capsule by mouth once daily, Disp: 30 capsule, Rfl: 5    metoprolol tartrate (LOPRESSOR) 25 MG tablet, take 1 tablet by mouth twice a day (Patient taking differently: Take 25 mg by mouth daily), Disp: 60 tablet, Rfl: 0    losartan-hydroCHLOROthiazide (HYZAAR) 50-12.5 MG per tablet, take 1 tablet by mouth once daily, Disp: 90 tablet, Rfl: 1    ibuprofen (ADVIL;MOTRIN) 800 MG tablet, Take 1 tablet by mouth every 8 hours as needed for Pain With food, Disp: 90 tablet, Rfl: 5    sildenafil (VIAGRA) 50 MG tablet, Take 1 tablet by mouth as needed for Erectile Dysfunction, Disp: 30 tablet, Rfl: 0    Zinc Oxide (DR SMITHS ADULT BARRIER) 10 % AERO, Use on area around belt buckle to help skin breakdown, 2-3 times a day, Disp: 170 g, Rfl: 5    clotrimazole-betamethasone (LOTRISONE) 1-0.05 % cream, apply to affected area twice a day, Disp: 45 g, Rfl: 1    folic acid (FOLVITE) 1 MG tablet, Take 1 tablet by mouth daily, Disp: 30 tablet, Rfl: 5    vitamin D3 (CHOLECALCIFEROL) 25 MCG (1000 UT) TABS tablet, Take 1 tablet by mouth daily, Disp: 30 tablet, Rfl: 5    thiamine 100 MG tablet, Take 1 tablet by mouth daily, Disp: 30 tablet, Rfl: 5    aspirin (RA ASPIRIN EC ADULT LOW ST) 81 MG EC tablet, Take 1 tablet by mouth daily, Disp: 30 tablet, Rfl: 5    Omega-3 Fatty Acids (FISH OIL) 1000 MG CAPS, Take 500 mg by mouth daily , Disp: , Rfl:     Multiple Vitamins-Minerals (ONE DAILY ADULTS 50+ PO), Take by mouth, Disp: , Rfl:     polyethylene glycol (GLYCOLAX) 17 GM/SCOOP powder, Take as directed for extended 2 day bowel prep, Disp: 238 g, Rfl: 0    polyethylene glycol (GLYCOLAX) 17 GM/SCOOP powder, Take as directed for extended 2 day bowel prep, Disp: 2 each, Rfl: 0    bisacodyl 5 MG EC tablet, Take as directed for bowel prep, Disp: 4 tablet, Rfl: 0    polyethylene glycol (GLYCOLAX) 17 GM/SCOOP powder, Take as directed for bowel prep (Patient not taking: Reported on 2/28/2023), Disp: 238 g, Rfl: 0    bisacodyl 5 MG EC tablet, Take as directed for bowel prep (Patient not taking: Reported on 2/28/2023), Disp: 4 tablet, Rfl: 0    ALLERGIES:   No Known Allergies    FAMILY HISTORY:       Problem Relation Age of Onset    Stroke Mother          SOCIAL HISTORY:   Social History     Socioeconomic History    Marital status:      Spouse name: Not on file    Number of children: Not on file    Years of education: Not on file    Highest education level: Not on file   Occupational History    Not on file   Tobacco Use    Smoking status: Every Day     Packs/day: 1.00     Years: 36.00     Pack years: 36.00     Types: Cigarettes     Start date: 11/30/1985    Smokeless tobacco: Never   Vaping Use    Vaping Use: Never used   Substance and Sexual Activity    Alcohol use: Yes     Types: 1 - 2 Cans of beer per week     Comment: occassional    Drug use: Yes     Frequency: 2.0 times per week     Types: Marijuana [place])    Sexual activity: Yes     Partners: Female   Other Topics Concern    Not on file   Social History Narrative    Not on file     Social Determinants of Health     Financial Resource Strain: Low Risk     Difficulty of Paying Living Expenses: Not hard at all   Food Insecurity: No Food Insecurity    Worried About [address] in the Last Year: Never true    [address] in the Last Year: Never true   Transportation Needs: No Transportation Needs    Lack of Transportation (Medical): No    Lack of Transportation (Non-Medical): No   Physical Activity: Insufficiently Active    Days of Exercise per Week: 3 days    Minutes of Exercise per Session: 20 min   Stress: Stress Concern Present    Feeling of Stress : Rather much   Social Connections: Moderately Isolated    Frequency of Communication with Friends and Family: More than three times a week    Frequency of Social Gatherings with Friends and Family: Three times a week    Attends [employer] Services: 1 to 4 times per year    Active Member of Clubs or Organizations: No    Attends Club or Organization Meetings: Never    Marital Status:    Intimate Partner Violence: Not on file   Housing Stability: High Risk    Unable to Pay for Housing in the Last Year: Yes    Number of [place] in the Last Year: 4    Unstable Housing in the Last Year: Yes       REVIEW OF SYSTEMS: A 12-point review of systems was obtained and pertinent positives and negatives were listed below. REVIEW OF SYSTEMS:     Constitutional: No fever, no chills, no lethargy, no weakness. HEENT:  No headache, otalgia, itchy eyes, nasal discharge or sore throat. Cardiac:  No chest pain, dyspnea, orthopnea or PND. Chest:   No cough, phlegm or wheezing. Abdomen:      Detailed by MA   Neuro:  No focal weakness, abnormal movements or seizure like activity. Skin:   No rashes, no itching.   :   No hematuria, no pyuria, no dysuria, no flank pain. Extremities:  No swelling or joint pains. ROS was otherwise negative    Review of Systems   Constitutional:  Negative for appetite change, fatigue and unexpected weight change. HENT:  Negative for sore throat and trouble swallowing. Respiratory:  Negative for cough, choking, chest tightness and shortness of breath. Gastrointestinal:  Negative for abdominal distention, abdominal pain, anal bleeding, blood in stool, constipation, diarrhea, nausea, rectal pain and vomiting. Genitourinary: Negative. Musculoskeletal: Negative. Skin: Negative. Allergic/Immunologic: Positive for food allergies. Negative for environmental allergies. Neurological:  Negative for dizziness, seizures, light-headedness, numbness and headaches. Psychiatric/Behavioral:  Negative for confusion and decreased concentration. The patient is not nervous/anxious. PHYSICAL EXAMINATION: Vital signs reviewed per the nursing documentation. /66 (Site: Left Upper Arm, Position: Sitting, Cuff Size: Small Adult)   Ht 5' 9\" (1.753 m)   Wt 188 lb (85.3 kg)   BMI 27.76 kg/m²   Body mass index is 27.76 kg/m². Physical Exam    Physical Exam   Constitutional: Patient is oriented to person, place, and time. Patient appears well-developed and well-nourished. HENT:   Head: Normocephalic and atraumatic. Eyes: Pupils are equal, round, and reactive to light. EOM are normal.   Neck: Normal range of motion. Neck supple. No JVD present. No tracheal deviation present. No thyromegaly present. Cardiovascular: Normal rate, regular rhythm, normal heart sounds and intact distal pulses. Pulmonary/Chest: Effort normal and breath sounds normal. No stridor. No respiratory distress. He has no wheezes. He has no rales. He exhibits no tenderness. Abdominal: Soft. Bowel sounds are normal. He exhibits no distension and no mass. There is no tenderness. There is no rebound and no guarding. No hernia. Musculoskeletal: Normal range of motion. Lymphadenopathy:    Patient has no cervical adenopathy. Neurological: Patient is alert and oriented to person, place, and time. Psychiatric: Patient has a normal mood and affect. Patient behavior is normal.       LABORATORY DATA: Reviewed  Lab Results   Component Value Date    WBC 9.4 05/23/2022    HGB 18.0 (H) 05/23/2022    HCT 52.2 (H) 05/23/2022    MCV 84.3 05/23/2022     05/23/2022     (L) 05/23/2022    K 3.7 05/23/2022    CL 99 05/23/2022    CO2 23 05/23/2022    BUN 17 05/23/2022    CREATININE 1.01 05/23/2022    LABALBU 4.5 05/23/2022    BILITOT 0.40 05/23/2022    ALKPHOS 100 05/23/2022    AST 21 05/23/2022    ALT 23 05/23/2022    INR 0.9 05/23/2022         Lab Results   Component Value Date    RBC 6.19 (H) 05/23/2022    HGB 18.0 (H) 05/23/2022    MCV 84.3 05/23/2022    MCH 29.1 05/23/2022    MCHC 34.5 05/23/2022    RDW 12.9 05/23/2022    MPV 9.8 05/23/2022    BASOPCT 1 05/23/2022    LYMPHSABS 1.60 05/23/2022    MONOSABS 0.85 05/23/2022    NEUTROABS 6.54 05/23/2022    EOSABS 0.30 05/23/2022    BASOSABS 0.09 05/23/2022         DIAGNOSTIC TESTING:     No results found. IMPRESSION: Mario Buamann Sr. is a 49 y.o. male with a past history remarkable for HVT, referred for evaluation of endoscopic removal of advanced polyps. Patient is an active smoker and social drinker. Uses marijuana. Underwent a recent screening colonoscopy the patient was identified to have several polyps approximately 14 total.  Some were observed to be greater than 10 mm which are categorized into advance polyps. Patient referred to gastroenterology for endoscopic removal possible EMR of residual polyps. Colonoscopy performed in February 2022-repeat colonoscopy and 12 months. Assessment  1. Adenomatous polyp of descending colon    2. Diverticulosis    3. Adenomatous polyp of sigmoid colon          Yolande Adhikari was seen today for follow-up.     Diagnoses and all orders for this visit:    Adenomatous polyp of sigmoid colon  -     COLONOSCOPY (Diagnostic); Future-patient was identified to have advanced polyps in the rectosigmoid area, EMR resection was performed. Repeat colonoscopy ordered for surveillance. Poor prep previously----2 day bowel recommended. Patient in agreement. Diverticulosis-pending discussed with the patient, may need to increase fiber intake after next visit. RTC: 3 months    Additional comments: Thank you for allowing me to participate in the care of Mr. Alva Villa. For any further questions please do not hesitate to contact me. I have reviewed and agree with the ROS entered by the MA/LPN from today's encounter documented in a separate note. Brendon Smith MD, MPH   Board Certified in Gastroenterology  Board Certified in [address] [place] [address] #: [phone]    this note is created with the assistance of a speech recognition program.  While intending to generate a document that actually reflects the content of the visit, the document can still have some errors including those of syntax and sound a like substitutions which may escape proof reading. It such instances, actual meaning can be extrapolated by contextual diversion.

## 2023-02-28 NOTE — TELEPHONE ENCOUNTER
Repeat colonoscopy/Dona REDMAN 5/2/23 @ 10:00am    Written/verbal instructions given @ visit    Miralax/dulcolax  Extended 2 day bowel prep

## 2023-03-01 ENCOUNTER — CARE COORDINATION (OUTPATIENT)
Dept: CARE COORDINATION | Age: 58
End: 2023-03-01

## 2023-03-01 NOTE — CARE COORDINATION
Left VM message asking patient to call me back at [phone] for care management follow up. He had colonoscopy but due to poor prep needs to repeated with 2 day prep prior. Will call again next week to check if called insurance regarding dentists, dental coverage.     Future Appointments   Date Time Provider Marco A Barreto   3/28/2023  9:20 AM Jorden Warren MD [facility] DUSTIN [place]

## 2023-03-01 NOTE — CARE COORDINATION
Ambulatory Care Coordination Note  3/1/2023    Patient Current Location:  Home: [address] [place] [id number]     ACM contacted the patient by telephone. Verified name and  with patient as identifiers. Provided introduction to self, and explanation of the ACM role. Patient returned call. He is filling out applications for apartments, makes him angry since has application fees to pay, they do background checks. He voiced understanding of why 2 day prep for upcoming colonoscopy. He got new insurance a few days ago, [employer]. He thinks has better dental insurance and will cover all his medications. After he gets his card he will call to find out dentists in the network so he can another appt. No symptoms or concerns right now. CC Plan:   -Follow up in 2 weeks to see if found a dentist, if scheduled an appt. Offered patient enrollment in the Remote Patient Monitoring (RPM) program for in-home monitoring: Patient is not eligible for RPM program.    Lab Results       None            Care Coordination Interventions    Referral from Primary Care Provider: Yes  Suggested Interventions and Community Resources          Goals Addressed                   This Visit's Progress     Conditions and Symptoms        I will schedule office visits, as directed by my provider. I will keep my appointment or reschedule if I have to cancel. I will notify my provider of any barriers to my plan of care. I will notify my provider of any symptoms that indicate a worsening of my condition.     Barriers: financial, overwhelmed by complexity of regimen, and lack of education  Plan for overcoming my barriers: ACM calls, education  Confidence: 8/10  Anticipated Goal Completion Date: 23                Future Appointments   Date Time Provider Marco A Barreto   3/28/2023  9:20 AM Lorena You MD [facility] IM [place]

## 2023-03-10 NOTE — TELEPHONE ENCOUNTER
E-scribe request from [employer] for Gabapentin 300 mg. Patient has an appt on 3/28/23.       Health Maintenance   Topic Date Due    COVID-19 Vaccine (1) Never done    Low dose CT lung screening  Never done    Annual Wellness Visit (AWV)  05/29/2019    Flu vaccine (1) Never done    DTaP/Tdap/Td vaccine (1 - Tdap) 03/29/2023 (Originally 11/27/1984)    Shingles vaccine (1 of 2) 03/29/2023 (Originally 11/27/2017)    Depression Screen  01/31/2024    Lipids  10/04/2025    Colorectal Cancer Screen  02/21/2026    Pneumococcal 0-64 years Vaccine  Completed    Hepatitis C screen  Completed    HIV screen  Completed    Hepatitis A vaccine  Aged Out    Hib vaccine  Aged Out    Meningococcal (ACWY) vaccine  Aged Out             (applicable per patient's age: Cancer Screenings, Depression Screening, Fall Risk Screening, Immunizations)    Hemoglobin A1C (%)   Date Value   10/19/2021 5.5   10/04/2020 5.8   02/16/2018 5.8     LDL Cholesterol (mg/dL)   Date Value   10/04/2020 95     AST (U/L)   Date Value   05/23/2022 21     ALT (U/L)   Date Value   05/23/2022 23     BUN (mg/dL)   Date Value   05/23/2022 17      (goal A1C is < 7)   (goal LDL is <100) need 30-50% reduction from baseline     BP Readings from Last 3 Encounters:   02/28/23 126/66   02/21/23 136/71   10/11/22 127/69    (goal /80)      All Future Testing planned in [facility]:  Lab Frequency Next Occurrence   COLONOSCOPY (Diagnostic) Once 02/07/2023   COLONOSCOPY (Diagnostic) Once 03/28/2023       Next Visit Date:  Future Appointments   Date Time Provider Marco A Barreto   3/28/2023  9:20 AM Tiana Layton MD [facility] IM [address]            Patient Active Problem List:     Essential hypertension     Sickle cell trait ([facility] [address]     Primary osteoarthritis of knee     Portal vein thrombosis, june 2017, completed course of warfarin      Gastroesophageal reflux disease without esophagitis     Chronic pain of right ankle     Diverticulosis     Polyp of sigmoid colon     Adenomatous polyp of descending colon     Rectal polyp     History of colon polyps

## 2023-03-12 DIAGNOSIS — I10 ESSENTIAL (PRIMARY) HYPERTENSION: ICD-10-CM

## 2023-03-13 RX ORDER — GABAPENTIN 300 MG/1
CAPSULE ORAL
Qty: 90 CAPSULE | Refills: 0 | Status: SHIPPED | OUTPATIENT
Start: 2023-03-13 | End: 2023-04-12

## 2023-03-13 NOTE — TELEPHONE ENCOUNTER
Request for lopressor.   Next hanny on 3/28/23    Next Visit Date:  Future Appointments   Date Time Provider Marco A Barreto   3/28/2023  9:20 AM Jean Paul Akbar MD [facility] IM [address] Maintenance   Topic Date Due    COVID-19 Vaccine (1) Never done    Low dose CT lung screening  Never done    Annual Wellness Visit (AWV)  05/29/2019    Flu vaccine (1) Never done    DTaP/Tdap/Td vaccine (1 - Tdap) 03/29/2023 (Originally 11[birth year]27[birth year]1986)    Shingles vaccine (1 of 2) 03/29/2023 (Originally 11/27/2017)    Depression Screen  01/31/2024    Lipids  10/04/2025    Colorectal Cancer Screen  02/21/2026    Pneumococcal 0-64 years Vaccine  Completed    Hepatitis C screen  Completed    HIV screen  Completed    Hepatitis A vaccine  Aged Out    Hib vaccine  Aged Out    Meningococcal (ACWY) vaccine  Aged Out       Hemoglobin A1C (%)   Date Value   10/19/2021 5.5   10/04/2020 5.8   02/16/2018 5.8             ( goal A1C is < 7)   No results found for: LABMICR  LDL Cholesterol (mg/dL)   Date Value   10/04/2020 95       (goal LDL is <100)   AST (U/L)   Date Value   05/23/2022 21     ALT (U/L)   Date Value   05/23/2022 23     BUN (mg/dL)   Date Value   05/23/2022 17     BP Readings from Last 3 Encounters:   02/28/23 126/66   02/21/23 136/71   10/11/22 127/69          (goal 120/80)    All Future Testing planned in [facility]  Lab Frequency Next Occurrence   COLONOSCOPY (Diagnostic) Once 02/07/2023   COLONOSCOPY (Diagnostic) Once 03/28/2023         Patient Active Problem List:     Essential hypertension     Sickle cell trait [address] [address]     Primary osteoarthritis of knee     Portal vein thrombosis, june 2017, completed course of warfarin      Gastroesophageal reflux disease without esophagitis     Chronic pain of right ankle     Diverticulosis     Polyp of sigmoid colon     Adenomatous polyp of descending colon     Rectal polyp     History of colon polyps

## 2023-03-15 ENCOUNTER — CARE COORDINATION (OUTPATIENT)
Dept: CARE COORDINATION | Age: 58
End: 2023-03-15

## 2023-03-15 NOTE — CARE COORDINATION
Ambulatory Care Coordination Note  3/15/2023    Patient Current Location:  Home: [address] [place] [address]     ACM contacted the patient by telephone. Verified name and  with patient as identifiers.    He still doesn't have insurance card yet from [employer] so hasn't called to find a dentist, priority to him has been his housing situation.  Has been busy, he did get an apartment and will move in next week. He hasn't been going to schooling due to housing issues, he has to write an appeal go get back in.  He denied needing any help right now, no symptoms or concerns. Reminded him about PCP appt next week, he plans on attending.  CC Plan:   Follow up in 3 weeks to check if called insurance about a dentist.  General Assessment    Do you have any symptoms that are causing concern?: No           Offered patient enrollment in the Remote Patient Monitoring (RPM) program for in-home monitoring: Patient is not eligible for RPM program.    Lab Results       None            Care Coordination Interventions    Referral from Primary Care Provider: Yes  Suggested Interventions and Community Resources          Goals Addressed                   This Visit's Progress     Conditions and Symptoms   Improving     I will schedule office visits, as directed by my provider.  I will keep my appointment or reschedule if I have to cancel.  I will notify my provider of any barriers to my plan of care.  I will notify my provider of any symptoms that indicate a worsening of my condition.    Barriers: financial, overwhelmed by complexity of regimen, and lack of education  Plan for overcoming my barriers: ACM calls, education  Confidence: 8/10  Anticipated Goal Completion Date: 23                Future Appointments   Date Time Provider Department Center   3/28/2023  9:20 AM Justice Valle MD [facility]

## 2023-03-15 NOTE — CARE COORDINATION
Left VM message asking patient to return writer's call for care management follow up, check if found a dentist through new insurance carrier. Will call again next week.     Future Appointments   Date Time Provider Marco A Barreto   3/28/2023  9:20 AM Gwendolyn Cabrera MD [facility] DUSTIN [place]

## 2023-04-07 ENCOUNTER — CARE COORDINATION (OUTPATIENT)
Dept: CARE COORDINATION | Age: 58
End: 2023-04-07

## 2023-04-07 NOTE — CARE COORDINATION
Left VM message asking patient to call care manager back for follow up, discuss finding a dentist with new insurance. Will call again in a few weeks.     Future Appointments   Date Time Provider Marco A Barreto   4/13/2023  3:00 PM Lois Ortiz MD [facility] D[place]

## 2023-04-13 ENCOUNTER — TELEMEDICINE (OUTPATIENT)
Dept: INTERNAL MEDICINE | Age: 58
End: 2023-04-13

## 2023-04-13 DIAGNOSIS — B35.6 JOCK ITCH: ICD-10-CM

## 2023-04-13 DIAGNOSIS — I10 ESSENTIAL (PRIMARY) HYPERTENSION: Primary | ICD-10-CM

## 2023-04-13 RX ORDER — AMOXICILLIN 500 MG/1
TABLET, FILM COATED ORAL
COMMUNITY
Start: 2023-02-10

## 2023-04-13 RX ORDER — CLOTRIMAZOLE AND BETAMETHASONE DIPROPIONATE 10; .64 MG/G; MG/G
CREAM TOPICAL
Qty: 45 G | Refills: 1 | Status: SHIPPED | OUTPATIENT
Start: 2023-04-13

## 2023-04-13 SDOH — ECONOMIC STABILITY: HOUSING INSECURITY
IN THE LAST 12 MONTHS, WAS THERE A TIME WHEN YOU DID NOT HAVE A STEADY PLACE TO SLEEP OR SLEPT IN A SHELTER (INCLUDING NOW)?: NO

## 2023-04-13 SDOH — ECONOMIC STABILITY: INCOME INSECURITY: HOW HARD IS IT FOR YOU TO PAY FOR THE VERY BASICS LIKE FOOD, HOUSING, MEDICAL CARE, AND HEATING?: NOT HARD AT ALL

## 2023-04-13 SDOH — ECONOMIC STABILITY: FOOD INSECURITY: WITHIN THE PAST 12 MONTHS, THE FOOD YOU BOUGHT JUST DIDN'T LAST AND YOU DIDN'T HAVE MONEY TO GET MORE.: NEVER TRUE

## 2023-04-13 SDOH — ECONOMIC STABILITY: FOOD INSECURITY: WITHIN THE PAST 12 MONTHS, YOU WORRIED THAT YOUR FOOD WOULD RUN OUT BEFORE YOU GOT MONEY TO BUY MORE.: NEVER TRUE

## 2023-04-25 ENCOUNTER — CARE COORDINATION (OUTPATIENT)
Dept: CARE COORDINATION | Age: 58
End: 2023-04-25

## 2023-04-25 NOTE — CARE COORDINATION
Attempted to call for care management follow up, left VM message asking for return call to discuss if found a dental provider with new insurance. Will call again in a week. No future appointments.
Michael

## 2023-05-05 ENCOUNTER — CARE COORDINATION (OUTPATIENT)
Dept: CARE COORDINATION | Age: 58
End: 2023-05-05

## 2023-05-05 NOTE — CARE COORDINATION
Writer left VM message asking for return call for care management follow up. Will call again in a week. His colonoscopy was canceled this week. No future appointments.

## 2023-05-05 NOTE — CARE COORDINATION
Ambulatory Care Coordination Note  2023    Patient Current Location: [facility]     ACM contacted the patient by telephone. Verified name and  with patient as identifiers. Provided introduction to self, and explanation of the ACM role. Challenges to be reviewed by the provider   Additional needs identified to be addressed with provider: No  none               Method of communication with provider: none. ACM: Iona Lucero RN    He returned call. He missed colonoscopy this week, forgot about it. Busy with moving, trying to get reinstated with his schooling (he missed when tryng to find housing- he bad been homeless for a while, going to court for his son's murder. Lot of stress lately. He stated still has [email or web address], did not get [employer]. He has list of providers of oral surgeons in their network, just not focusing on it right now due to other issues. Socorro Ibarra wanted $3500 to pull his teeth which he doesn't have. Trying to work on saving up money for that and other bills. Discussed importance of rescheduling with GI for colonoscopy due to multiple polyps, he agreed and stated will call that office, he has the phone number. No upcoming PCP appts scheduled. No symptoms or problems right now. He has lost some weight from not eating well and stress. He denied needing any assistance from care manager for appts. CC Plan:   -Follow up in a month to see if rescheduled colonoscopy, if scheduled with an oral surgeon.   General Assessment    Do you have any symptoms that are causing concern?: No           Offered patient enrollment in the Remote Patient Monitoring (RPM) program for in-home monitoring: Patient is not eligible for RPM program.    Lab Results       None            Care Coordination Interventions    Referral from Primary Care Provider: Yes  Suggested Interventions and Community Resources          Goals Addressed                   This Visit's Progress     Conditions and Symptoms   Improving

## 2023-06-05 ENCOUNTER — CARE COORDINATION (OUTPATIENT)
Dept: CARE COORDINATION | Age: 58
End: 2023-06-05

## 2023-06-05 NOTE — CARE COORDINATION
Left VM message asking patient to return care manager's call. Reminded him to call GI office to reschedule colonoscopy. Will call again in a week. No future appointments.

## 2023-06-16 DIAGNOSIS — E55.9 VITAMIN D DEFICIENCY: ICD-10-CM

## 2023-06-16 DIAGNOSIS — K21.9 GASTROESOPHAGEAL REFLUX DISEASE WITHOUT ESOPHAGITIS: ICD-10-CM

## 2023-06-16 DIAGNOSIS — D57.3 SICKLE CELL TRAIT (HCC): ICD-10-CM

## 2023-06-16 DIAGNOSIS — I10 ESSENTIAL (PRIMARY) HYPERTENSION: ICD-10-CM

## 2023-06-19 ENCOUNTER — CARE COORDINATION (OUTPATIENT)
Dept: CASE MANAGEMENT | Age: 58
End: 2023-06-19

## 2023-06-19 NOTE — CARE COORDINATION
Request from [address] Galeana RN.,  please call member services at [facility] to see what oral surgeons are covered. Called spoke with Tarik Curry,  she is emailing the patient a list from oral surgeons that are covered in his area.     Austen Obregon, [address] [facility] Coordination Transition

## 2023-06-20 ENCOUNTER — CARE COORDINATION (OUTPATIENT)
Dept: CARE COORDINATION | Age: 58
End: 2023-06-20

## 2023-06-20 RX ORDER — GABAPENTIN 300 MG/1
300 CAPSULE ORAL 3 TIMES DAILY
Qty: 90 CAPSULE | Refills: 0 | Status: SHIPPED | OUTPATIENT
Start: 2023-06-20 | End: 2023-07-20

## 2023-06-20 RX ORDER — LANOLIN ALCOHOL/MO/W.PET/CERES
100 CREAM (GRAM) TOPICAL DAILY
Qty: 30 TABLET | Refills: 5 | Status: SHIPPED | OUTPATIENT
Start: 2023-06-20

## 2023-06-20 RX ORDER — MELATONIN
1000 DAILY
Qty: 30 TABLET | Refills: 5 | Status: SHIPPED | OUTPATIENT
Start: 2023-06-20

## 2023-06-20 RX ORDER — OMEPRAZOLE 20 MG/1
20 CAPSULE, DELAYED RELEASE ORAL DAILY
Qty: 30 CAPSULE | Refills: 5 | Status: SHIPPED | OUTPATIENT
Start: 2023-06-20

## 2023-06-20 RX ORDER — FOLIC ACID 1 MG/1
1000 TABLET ORAL DAILY
Qty: 30 TABLET | Refills: 5 | Status: SHIPPED | OUTPATIENT
Start: 2023-06-20

## 2023-06-20 RX ORDER — ASPIRIN 81 MG/1
81 TABLET ORAL DAILY
Qty: 30 TABLET | Refills: 5 | Status: SHIPPED | OUTPATIENT
Start: 2023-06-20

## 2023-06-23 ENCOUNTER — TELEPHONE (OUTPATIENT)
Dept: INTERNAL MEDICINE | Age: 58
End: 2023-06-23

## 2023-06-29 ENCOUNTER — OFFICE VISIT (OUTPATIENT)
Dept: INTERNAL MEDICINE | Age: 58
End: 2023-06-29
Payer: MEDICARE

## 2023-06-29 ENCOUNTER — CARE COORDINATION (OUTPATIENT)
Dept: CARE COORDINATION | Age: 58
End: 2023-06-29

## 2023-06-29 VITALS
DIASTOLIC BLOOD PRESSURE: 77 MMHG | WEIGHT: 184.6 LBS | HEIGHT: 69 IN | OXYGEN SATURATION: 97 % | BODY MASS INDEX: 27.34 KG/M2 | TEMPERATURE: 98.1 F | SYSTOLIC BLOOD PRESSURE: 139 MMHG | HEART RATE: 80 BPM

## 2023-06-29 DIAGNOSIS — Z87.891 PERSONAL HISTORY OF TOBACCO USE: ICD-10-CM

## 2023-06-29 DIAGNOSIS — T78.40XA ALLERGY, INITIAL ENCOUNTER: ICD-10-CM

## 2023-06-29 DIAGNOSIS — I10 ESSENTIAL HYPERTENSION: ICD-10-CM

## 2023-06-29 DIAGNOSIS — D57.3 SICKLE CELL TRAIT (HCC): ICD-10-CM

## 2023-06-29 DIAGNOSIS — F10.20 ALCOHOLISM (HCC): Primary | ICD-10-CM

## 2023-06-29 DIAGNOSIS — B35.6 JOCK ITCH: ICD-10-CM

## 2023-06-29 DIAGNOSIS — R23.8 SKIN BREAKDOWN: ICD-10-CM

## 2023-06-29 PROCEDURE — 3075F SYST BP GE 130 - 139MM HG: CPT | Performed by: INTERNAL MEDICINE

## 2023-06-29 PROCEDURE — 99214 OFFICE O/P EST MOD 30 MIN: CPT | Performed by: INTERNAL MEDICINE

## 2023-06-29 PROCEDURE — 3078F DIAST BP <80 MM HG: CPT | Performed by: INTERNAL MEDICINE

## 2023-06-29 PROCEDURE — 4004F PT TOBACCO SCREEN RCVD TLK: CPT | Performed by: INTERNAL MEDICINE

## 2023-06-29 PROCEDURE — 3017F COLORECTAL CA SCREEN DOC REV: CPT | Performed by: INTERNAL MEDICINE

## 2023-06-29 PROCEDURE — G0296 VISIT TO DETERM LDCT ELIG: HCPCS | Performed by: INTERNAL MEDICINE

## 2023-06-29 PROCEDURE — G8419 CALC BMI OUT NRM PARAM NOF/U: HCPCS | Performed by: INTERNAL MEDICINE

## 2023-06-29 PROCEDURE — G8427 DOCREV CUR MEDS BY ELIG CLIN: HCPCS | Performed by: INTERNAL MEDICINE

## 2023-06-29 RX ORDER — CLOTRIMAZOLE AND BETAMETHASONE DIPROPIONATE 10; .64 MG/G; MG/G
CREAM TOPICAL
Qty: 45 G | Refills: 1 | Status: SHIPPED | OUTPATIENT
Start: 2023-06-29

## 2023-06-29 RX ORDER — ZINC OXIDE 10 G/100G
SPRAY TOPICAL
Qty: 170 G | Refills: 5 | Status: SHIPPED | OUTPATIENT
Start: 2023-06-29

## 2023-06-29 RX ORDER — CHLORAL HYDRATE 500 MG
1000 CAPSULE ORAL DAILY
Qty: 90 CAPSULE | Refills: 1 | Status: SHIPPED | OUTPATIENT
Start: 2023-06-29

## 2023-06-29 RX ORDER — OLOPATADINE HYDROCHLORIDE 1 MG/ML
1 SOLUTION/ DROPS OPHTHALMIC 2 TIMES DAILY
Qty: 5 ML | Refills: 1 | Status: SHIPPED | OUTPATIENT
Start: 2023-06-29 | End: 2023-07-29

## 2023-06-29 RX ORDER — FLUTICASONE PROPIONATE 50 MCG
2 SPRAY, SUSPENSION (ML) NASAL DAILY
Qty: 16 G | Refills: 0 | Status: SHIPPED | OUTPATIENT
Start: 2023-06-29

## 2023-06-29 ASSESSMENT — ENCOUNTER SYMPTOMS
SINUS PAIN: 1
EYE ITCHING: 1
GASTROINTESTINAL NEGATIVE: 1
RESPIRATORY NEGATIVE: 1

## 2023-07-06 ENCOUNTER — CARE COORDINATION (OUTPATIENT)
Dept: CARE COORDINATION | Age: 58
End: 2023-07-06

## 2023-07-06 NOTE — CARE COORDINATION
Ambulatory Care Coordination Note  7/6/2023    Patient Current Location: [place]      He saw PCP last week, needs to schedule CT lung screen. Is not interested in quitting smoking at this time. No new symptoms or concerns Informed him his insurance company emailed him a list of oral surgeons in their network, he stated rarely checks his email but he will look for it and find the list. He is busy with work and trying to restart school to become a . Denied any needs right now. CC Plan:   -Follow up in a month to see if any progress with finding oral surgeon and making appt, graduate from care management. Offered patient enrollment in the Remote Patient Monitoring (RPM) program for in-home monitoring: Patient is not eligible for RPM program.    Lab Results       None            Care Coordination Interventions    Referral from Primary Care Provider: Yes  Suggested Interventions and Community Resources          Goals Addressed                      This Visit's Progress      Conditions and Symptoms   On track      I will schedule office visits, as directed by my provider. I will keep my appointment or reschedule if I have to cancel. I will notify my provider of any barriers to my plan of care. I will notify my provider of any symptoms that indicate a worsening of my condition. Barriers: financial, overwhelmed by complexity of regimen, and lack of education  Plan for overcoming my barriers: ACM calls, education  Confidence: 8/10  Anticipated Goal Completion Date: 4/30/23          Patient Stated (pt-stated)   No change      Wan Form will find an oral surgeon in-network. Barriers: financial and lack of support  Plan for overcoming my barriers: Wan Form will work with . Confidence: 8/10  Anticipated Goal Completion Date: 07/01/2023              No future appointments.

## 2023-07-07 ENCOUNTER — TELEPHONE (OUTPATIENT)
Dept: PHARMACY | Facility: CLINIC | Age: 58
End: 2023-07-07

## 2023-07-07 NOTE — TELEPHONE ENCOUNTER
POPULATION HEALTH CLINICAL PHARMACY: ADHERENCE REVIEW  Identified care gap per [place]: fills at [facility] Aid: ACE/ARB adherence    Patient also appears to be prescribed: ACE/ARB    ASSESSMENT    ACE/ARB ADHERENCE    Insurance Records claims through 23 (Prior Year  [address] = not reported; YTD  [address] = FIRST FILL; Potential Fail Date: 23):   LOSARTAN-HCTZ 50-12.5 MG TAB last filled on 23 for 90 day supply. Next refill due: 05/15/23    Prescribed si tablet/capsule daily    Per Reconcile Dispense History: last filled on 23 for 90 day supply. Per [facility]  Pharmacy: last picked up on 23 for 90 day supply. will get 90 day supply ready to  since past due. BP Readings from Last 3 Encounters:   23 139/77   23 126/66   23 136/71     CrCl cannot be calculated (Patient's most recent lab result is older than the maximum 180 days allowed. ). Lab Results   Component Value Date    CREATININE 1.01 2022     Lab Results   Component Value Date    K 3.7 2022       PLAN  The following are interventions that have been identified:   Patient overdue refilling Losartan-hctz 50-12.5mg and active on home medication list.     Reached patient to review. Called pt to let him know Losartan hctz will be ready for  today at [address] [place] [address], [address] [place] [phone].   He thanked me for the call     Recent Visits  Date Type Provider Dept   23 Office Visit Aleksandr Vergara [facility]   10/11/22 Office Visit MD Suzie Hungx [facility]   22 Office Visit MD Agustin Hungpx [facility]   22 Office Visit MD Agustin Hungpx [facility]   22 Office Visit MD Suzie Hungx [facility]   22 Office Visit MD Agustin Hungpx [facility]   22 Office Visit Aleksandr Vergara MD [facility]   Showing recent visits within past 540 days with a meds authorizing provider and meeting all other requirements  Future

## 2023-07-14 ENCOUNTER — OFFICE VISIT (OUTPATIENT)
Dept: INTERNAL MEDICINE | Age: 58
End: 2023-07-14
Payer: MEDICARE

## 2023-07-14 ENCOUNTER — CARE COORDINATION (OUTPATIENT)
Dept: CARE COORDINATION | Age: 58
End: 2023-07-14

## 2023-07-14 VITALS
DIASTOLIC BLOOD PRESSURE: 84 MMHG | TEMPERATURE: 97.9 F | HEIGHT: 72 IN | OXYGEN SATURATION: 96 % | SYSTOLIC BLOOD PRESSURE: 150 MMHG | WEIGHT: 180.4 LBS | BODY MASS INDEX: 24.43 KG/M2 | HEART RATE: 62 BPM

## 2023-07-14 DIAGNOSIS — I10 ESSENTIAL HYPERTENSION: ICD-10-CM

## 2023-07-14 DIAGNOSIS — Z20.2 POSSIBLE EXPOSURE TO STD: Primary | ICD-10-CM

## 2023-07-14 PROCEDURE — 3079F DIAST BP 80-89 MM HG: CPT

## 2023-07-14 PROCEDURE — 3077F SYST BP >= 140 MM HG: CPT

## 2023-07-14 PROCEDURE — 99213 OFFICE O/P EST LOW 20 MIN: CPT

## 2023-07-14 ASSESSMENT — ENCOUNTER SYMPTOMS
ANAL BLEEDING: 0
VOMITING: 0
RECTAL PAIN: 0
ABDOMINAL PAIN: 0
NAUSEA: 0

## 2023-07-14 NOTE — CARE COORDINATION
attempted to contact [address] [place] [address]. No answer.  did not leave message.  Noted Alfa Nicole note that [address] [place] [address] denies needs and busy with schooling.  will attempt to follow up with [address] [place] [address] 1 more time regarding social needs before signing off.

## 2023-07-14 NOTE — PROGRESS NOTES
[facility] PHYSICIANS  [facility]  [address]  [address] [address]  Dept: [phone]  Dept Fax: [phone]    Office Progress/Follow Up Note  Date of patient's visit: 7/14/2023  Patient's Name:  Matite Alcantara. YOB: 1965            Patient Care Team:  Darian Willis MD as PCP - General (Internal Medicine)  Darian Willis MD as PCP - Empaneled Provider  Buzz Goodman MD as Consulting Physician (Vascular Surgery)  Trevin Hare MD as Surgeon (General Surgery: [facility] Jeronimo)  Evy Carablalo MD as Consulting Physician (Infectious Diseases)  Hernan Pastrana MD as Consulting Physician (Gastroenterology)  Buzz Goodman MD as Consulting Physician (Vascular Surgery)  Zahira Acharya RN as [facility] as   ________________________________________________________________________      Reason for Visit: Routine same day visit  ________________________________________________________________________  Chief Complaint:    Exposure to STIs  ________________________________________________________________________  History of Presenting Illness:  History was obtained from: patient, electronic medical record. Nicolás Roberto  is a 56 y.o. is here for same-day visit with the concern of    Suspected exposure to STD. Patient mentioned that he has an exposure to STIs. He remained an monogamous relationship and partner tested for bacterial vaginosis. Denies any symptoms including urethral discharge fever chills testicular pain or urinary symptoms. Patient was recently seen in the office by PCP and all chronic medical problems were addressed. Blood pressure today slightly on the higher side 150/80 but he did not take his morning pills and usually gets in the target range.     Patient Active Problem List   Diagnosis    Essential hypertension    Sickle cell trait (HCC)    Primary osteoarthritis of knee    Portal vein thrombosis,

## 2023-07-14 NOTE — PROGRESS NOTES
Attending Physician Statement  I have discussed the care of Marielos Davis Sr., including pertinent history and exam findings with the resident. I have reviewed the key elements of all parts of the encounter with the resident. I agree with the assessment, and status of the problem list as documented. The plan and orders should include   Orders Placed This Encounter   Procedures    Hemoglobin A1C    Urinalysis with Microscopic    and this was also documented by the resident. Patient presented to the clinic for same-day visit for checkup due to an STI exposure. Patient states that his partner had recently been diagnosed with bacterial vaginosis and has been treated. He claims to be in a monogamous relationship. He denies any symptoms. I did not evaluate the patient personally did not speak to him personally. Based on my conversation with the resident that I am supervising patient does not need any further treatment or evaluation. He will follow-up with the clinic during the next scheduled appointment. The medication list was reviewed with the resident and is up to date. The return visit should be in 3 months .     Batool Neff MD  Attending Physician,  Department of Internal Medicine  [address] [place] [address] Internal Medicine  [facility]      7/14/2023, 11:24 AM

## 2023-07-26 ENCOUNTER — HOSPITAL ENCOUNTER (OUTPATIENT)
Age: 58
Setting detail: SPECIMEN
Discharge: HOME | End: 2023-07-26

## 2023-07-26 DIAGNOSIS — I10 ESSENTIAL HYPERTENSION: ICD-10-CM

## 2023-07-26 DIAGNOSIS — D57.3 SICKLE CELL TRAIT (HCC): ICD-10-CM

## 2023-07-26 DIAGNOSIS — F10.20 ALCOHOLISM (HCC): ICD-10-CM

## 2023-07-26 LAB
ALBUMIN SERPL-MCNC: 4.4 G/DL (ref 3.5–5.2)
ALBUMIN/GLOB SERPL: 1.2 {RATIO} (ref 1–2.5)
ALP SERPL-CCNC: 84 U/L (ref 40–129)
ALT SERPL-CCNC: 14 U/L (ref 5–41)
ANION GAP SERPL CALCULATED.3IONS-SCNC: 18 MMOL/L (ref 9–17)
AST SERPL-CCNC: 19 U/L
BASOPHILS # BLD: 0.08 K/UL (ref 0–0.2)
BASOPHILS NFR BLD: 1 % (ref 0–2)
BILIRUB SERPL-MCNC: 0.5 MG/DL (ref 0.3–1.2)
BUN SERPL-MCNC: 14 MG/DL (ref 6–20)
CALCIUM SERPL-MCNC: 9.3 MG/DL (ref 8.6–10.4)
CHLORIDE SERPL-SCNC: 107 MMOL/L (ref 98–107)
CHOLEST SERPL-MCNC: 161 MG/DL
CHOLESTEROL/HDL RATIO: 2.7
CO2 SERPL-SCNC: 17 MMOL/L (ref 20–31)
CREAT SERPL-MCNC: 0.9 MG/DL (ref 0.7–1.2)
EOSINOPHIL # BLD: 0.3 K/UL (ref 0–0.44)
EOSINOPHILS RELATIVE PERCENT: 3 % (ref 1–4)
ERYTHROCYTE [DISTWIDTH] IN BLOOD BY AUTOMATED COUNT: 13.5 % (ref 11.8–14.4)
GFR SERPL CREATININE-BSD FRML MDRD: >60 ML/MIN/1.73M2
GLUCOSE SERPL-MCNC: 86 MG/DL (ref 70–99)
HCT VFR BLD AUTO: 49.2 % (ref 40.7–50.3)
HDLC SERPL-MCNC: 59 MG/DL
HGB BLD-MCNC: 16.6 G/DL (ref 13–17)
IMM GRANULOCYTES # BLD AUTO: 0.04 K/UL (ref 0–0.3)
IMM GRANULOCYTES NFR BLD: 0 %
LDLC SERPL CALC-MCNC: 93 MG/DL (ref 0–130)
LYMPHOCYTES NFR BLD: 2.04 K/UL (ref 1.1–3.7)
LYMPHOCYTES RELATIVE PERCENT: 21 % (ref 24–43)
MCH RBC QN AUTO: 29 PG (ref 25.2–33.5)
MCHC RBC AUTO-ENTMCNC: 33.7 G/DL (ref 28.4–34.8)
MCV RBC AUTO: 85.9 FL (ref 82.6–102.9)
MONOCYTES NFR BLD: 0.52 K/UL (ref 0.1–1.2)
MONOCYTES NFR BLD: 5 % (ref 3–12)
NEUTROPHILS NFR BLD: 70 % (ref 36–65)
NEUTS SEG NFR BLD: 6.76 K/UL (ref 1.5–8.1)
NRBC BLD-RTO: 0 PER 100 WBC
PLATELET # BLD AUTO: 255 K/UL (ref 138–453)
PMV BLD AUTO: 9.4 FL (ref 8.1–13.5)
POTASSIUM SERPL-SCNC: 4.3 MMOL/L (ref 3.7–5.3)
PROT SERPL-MCNC: 8 G/DL (ref 6.4–8.3)
RBC # BLD AUTO: 5.73 M/UL (ref 4.21–5.77)
SODIUM SERPL-SCNC: 142 MMOL/L (ref 135–144)
TRIGL SERPL-MCNC: 44 MG/DL
WBC OTHER # BLD: 9.7 K/UL (ref 3.5–11.3)

## 2023-07-27 ENCOUNTER — CARE COORDINATION (OUTPATIENT)
Dept: CARE COORDINATION | Age: 58
End: 2023-07-27

## 2023-07-27 NOTE — CARE COORDINATION
attempted to contact [address] [place] [address]. No answer.  did not leave a VM.  has been unsuccessful engaging in conversation. Leonel informed Alfa Nicole he was busy with school and denied questions.  signing off.

## 2023-07-31 ENCOUNTER — TELEPHONE (OUTPATIENT)
Dept: INTERNAL MEDICINE | Age: 58
End: 2023-07-31

## 2023-07-31 NOTE — TELEPHONE ENCOUNTER
Pt calling c/o abscess in mouth. Pt woke up with face swollen and is asking or an antibiotic to be sent to pharmacy. Pt was advised to go to ED and notified that no provider is in the office on Mondays but pt insisted on message being put to provider stating \"this is serious\" Please advise.

## 2023-08-07 ENCOUNTER — CARE COORDINATION (OUTPATIENT)
Dept: CARE COORDINATION | Age: 58
End: 2023-08-07

## 2023-08-07 NOTE — CARE COORDINATION
Ambulatory Care Coordination Note  8/7/2023    Patient Current Location:  Home: [address] [place], [address] [place] [address]      He had a sore in his mouth, PCP stated needed to be seen, didn't prescribe anything. Patient stated sore is gone now, he gargled with Listerine and swelling and pain gone. He has the email with names of oral surgeons with his insurance but hasn't called any yet. Declined help from writer again for scheduling any appts, stated he is handling it. Denied any other needs right now. He is filing for unemployment this week and also food stamps. Not back in school because has to pay $554 first before can be reinstated.  had tried to contact patient but was unable to contact him. Graduated from care management as today is denying needs writer can help with, is not really engaged. Offered patient enrollment in the Remote Patient Monitoring (RPM) program for in-home monitoring: Patient is not eligible for RPM program.    Lab Results       None            Care Coordination Interventions    Referral from Primary Care Provider: Yes  Suggested Interventions and Community Resources          Goals Addressed                      This Visit's Progress      COMPLETED: Conditions and Symptoms         I will schedule office visits, as directed by my provider. I will keep my appointment or reschedule if I have to cancel. I will notify my provider of any barriers to my plan of care. I will notify my provider of any symptoms that indicate a worsening of my condition. Barriers: financial, overwhelmed by complexity of regimen, and lack of education  Plan for overcoming my barriers: ACM calls, education  Confidence: 8/10  Anticipated Goal Completion Date: 4/30/23          Patient Stated (pt-stated)   Improving      Jose Laurent will find an oral surgeon in-network. Barriers: financial and lack of support  Plan for overcoming my barriers: Jose Laurent will work with .    Confidence:

## 2023-08-14 NOTE — CARE COORDINATION
Initial Contact Social Work Note - Ambulatory  6/21/2023      Date of referral: 6/20/2023  Referral received from: Erma Varela. Reason for referral: Housing     Previous SW referral: Yes  If yes, brief summary of outcome: Homeless Board . Two Identifiers Verified: Yes    Insurance Provider: [address]:   none    [place] Status:  N/a    Community Providers:  Homeless Board     ADL Assistance Needed: N/A    Housing/Living Concerns or Home Modification Needs: Per Sumeet Curtis the [employer] found him housing. Transportation Concern: no    Medication Cost Concern: no     Medication Adherence Concern: no    Financial Concern(s): no    Income (only if applicable): employment    Ability to Read/Write: Yes    Advance Care Plan:  N/A    Other: Oral surgeon. Identified Needs:  Oral surgeon       Method of Communication With Provider (if appropriate): None       Goals Addressed                      This Visit's Progress       Patient Stated      Patient Stated (pt-stated)         Sumeet Curtis will find an oral surgeon in-network. Barriers: financial and lack of support  Plan for overcoming my barriers: Sumeet Curtis will work with . Confidence: 8/10  Anticipated Goal Completion Date: 07/01/2023               Sumeet Curtis denied being homeless. Sumeet Curtis reports that he worked with the [employer] and they assisted with housing. Sumeet Curtis reports that is familiar with local food pantries. Sumeet Curtis is employed however reports that he only works part-time which is hard to afford all his bills. Sumeet Curtis denied wanting to get on PIPP/HEAP and reports that he does not trust them. 6/21   attempted to contact Sumeet Curtis regarding E-mail sent to him with in-network Oral surgeons. Plan:    will follow up with Sumeet Curtis to confirm he received E-mail. [] : intact incisions with sutures in place

## 2023-09-21 ENCOUNTER — OFFICE VISIT (OUTPATIENT)
Dept: INTERNAL MEDICINE | Age: 58
End: 2023-09-21
Payer: COMMERCIAL

## 2023-09-21 VITALS
DIASTOLIC BLOOD PRESSURE: 72 MMHG | SYSTOLIC BLOOD PRESSURE: 138 MMHG | WEIGHT: 180.8 LBS | OXYGEN SATURATION: 97 % | TEMPERATURE: 97.2 F | HEART RATE: 73 BPM | HEIGHT: 72 IN | BODY MASS INDEX: 24.49 KG/M2

## 2023-09-21 DIAGNOSIS — E55.9 VITAMIN D DEFICIENCY: ICD-10-CM

## 2023-09-21 DIAGNOSIS — D57.3 SICKLE CELL TRAIT (HCC): ICD-10-CM

## 2023-09-21 DIAGNOSIS — I10 ESSENTIAL HYPERTENSION: ICD-10-CM

## 2023-09-21 DIAGNOSIS — K21.9 GASTROESOPHAGEAL REFLUX DISEASE WITHOUT ESOPHAGITIS: ICD-10-CM

## 2023-09-21 DIAGNOSIS — Z87.891 PERSONAL HISTORY OF TOBACCO USE: ICD-10-CM

## 2023-09-21 DIAGNOSIS — Z00.00 MEDICARE ANNUAL WELLNESS VISIT, SUBSEQUENT: Primary | ICD-10-CM

## 2023-09-21 PROCEDURE — G0296 VISIT TO DETERM LDCT ELIG: HCPCS | Performed by: INTERNAL MEDICINE

## 2023-09-21 ASSESSMENT — LIFESTYLE VARIABLES
HOW OFTEN DURING THE LAST YEAR HAVE YOU FAILED TO DO WHAT WAS NORMALLY EXPECTED FROM YOU BECAUSE OF DRINKING: 0
HAVE YOU OR SOMEONE ELSE BEEN INJURED AS A RESULT OF YOUR DRINKING: 0
HOW OFTEN DURING THE LAST YEAR HAVE YOU BEEN UNABLE TO REMEMBER WHAT HAPPENED THE NIGHT BEFORE BECAUSE YOU HAD BEEN DRINKING: 0
HAS A RELATIVE, FRIEND, DOCTOR, OR ANOTHER HEALTH PROFESSIONAL EXPRESSED CONCERN ABOUT YOUR DRINKING OR SUGGESTED YOU CUT DOWN: 0
HOW OFTEN DURING THE LAST YEAR HAVE YOU FOUND THAT YOU WERE NOT ABLE TO STOP DRINKING ONCE YOU HAD STARTED: 0
HOW MANY STANDARD DRINKS CONTAINING ALCOHOL DO YOU HAVE ON A TYPICAL DAY: 1 OR 2
HOW OFTEN DURING THE LAST YEAR HAVE YOU HAD A FEELING OF GUILT OR REMORSE AFTER DRINKING: 0
HOW OFTEN DO YOU HAVE A DRINK CONTAINING ALCOHOL: 2-3 TIMES A WEEK
HOW OFTEN DURING THE LAST YEAR HAVE YOU NEEDED AN ALCOHOLIC DRINK FIRST THING IN THE MORNING TO GET YOURSELF GOING AFTER A NIGHT OF HEAVY DRINKING: 0

## 2023-09-21 ASSESSMENT — PATIENT HEALTH QUESTIONNAIRE - PHQ9
SUM OF ALL RESPONSES TO PHQ QUESTIONS 1-9: 0
SUM OF ALL RESPONSES TO PHQ9 QUESTIONS 1 & 2: 0
2. FEELING DOWN, DEPRESSED OR HOPELESS: 0
SUM OF ALL RESPONSES TO PHQ QUESTIONS 1-9: 0
1. LITTLE INTEREST OR PLEASURE IN DOING THINGS: 0

## 2023-09-21 NOTE — PROGRESS NOTES
Medicare Annual Wellness Visit    Amaris Smith is here for Medicare AWV (Last AWV 12.6.2017) and Hypertension (Routine 3 month f/u)    Assessment & Plan   Medicare annual wellness visit, subsequent  Vitamin D deficiency  Sickle cell trait ([address])  Gastroesophageal reflux disease without esophagitis  Personal history of tobacco use  -     FL VISIT TO DISCUSS LUNG CA SCREEN W LDCT  -     CT Lung Screen (Initial/Annual/Baseline); Future    Recommendations for Preventive Services Due: see orders and patient instructions/AVS.  Recommended screening schedule for the next 5-10 years is provided to the patient in written form: see Patient Instructions/AVS.     Return in 3 months (on 12/21/2023). Subjective   The following acute and/or chronic problems were also addressed today:  Hypertension:  Home blood pressure monitoring: No.  He is adherent to a low sodium diet. Patient denies chest pain and shortness of breath. Antihypertensive medication side effects: no medication side effects noted. Use of agents associated with hypertension: none. Sodium (mmol/L)   Date Value   07/26/2023 142    BUN (mg/dL)   Date Value   07/26/2023 14    Glucose (mg/dL)   Date Value   07/26/2023 86   05/08/2012 132 (H)      Potassium (mmol/L)   Date Value   07/26/2023 4.3    Creatinine (mg/dL)   Date Value   07/26/2023 0.9           Patient's complete Health Risk Assessment and screening values have been reviewed and are found in Flowsheets. The following problems were reviewed today and where indicated follow up appointments were made and/or referrals ordered.     Positive Risk Factor Screenings with Interventions:          Drug Use:    DAST-10 Score: 1     Interpretation:  1-2: Low level - Monitor, re-assess at a later date  3-5: Moderate level - Further Investigation  6-8: Substantial level - Intensive Assessment  9-10: Severe level - Intensive Assessment    Interventions:  Patient declined any

## 2023-09-26 ENCOUNTER — NURSE ONLY (OUTPATIENT)
Dept: INTERNAL MEDICINE | Age: 58
End: 2023-09-26

## 2023-09-26 DIAGNOSIS — I10 ESSENTIAL HYPERTENSION: ICD-10-CM

## 2023-09-26 DIAGNOSIS — H61.23 BILATERAL IMPACTED CERUMEN: Primary | ICD-10-CM

## 2023-09-26 PROCEDURE — 99999 PR OFFICE/OUTPT VISIT,PROCEDURE ONLY: CPT | Performed by: INTERNAL MEDICINE

## 2023-09-26 RX ORDER — LOSARTAN POTASSIUM AND HYDROCHLOROTHIAZIDE 12.5; 5 MG/1; MG/1
TABLET ORAL
Qty: 90 TABLET | Refills: 1 | Status: SHIPPED | OUTPATIENT
Start: 2023-09-26

## 2023-09-26 NOTE — TELEPHONE ENCOUNTER
Patient Active Problem List:     Essential hypertension     Sickle cell trait ([address])     Primary osteoarthritis of knee     Portal vein thrombosis, june 2017, completed course of warfarin      Gastroesophageal reflux disease without esophagitis     Chronic pain of right ankle     Diverticulosis     Polyp of sigmoid colon     Adenomatous polyp of descending colon     Rectal polyp     History of colon polyps     Alcoholism ([address])

## 2023-09-26 NOTE — PROGRESS NOTES
Pt here for bilateral ear irrigation d/t irritation and full feeling in ears. There is some wax bilaterally but able to visualize both tympanic membranes. Solution of warm water and hydrogen peroxide used to flush ears bilaterally. Remaining wax removed bilaterally as well, pt tolerated procedure well and is happy with results.

## 2024-04-13 DIAGNOSIS — B35.6 JOCK ITCH: ICD-10-CM

## 2024-04-13 DIAGNOSIS — T78.40XA ALLERGY, INITIAL ENCOUNTER: ICD-10-CM

## 2024-04-14 DIAGNOSIS — I10 ESSENTIAL (PRIMARY) HYPERTENSION: ICD-10-CM

## 2024-04-15 NOTE — TELEPHONE ENCOUNTER
Escribe refill request from [employer] to change the Metoprolol 25 mg to a 90 day supply.      Last visit: 9/21/23  Last Med refill: 6/20/23  Does patient have enough medication for 72 hours: No:     Next Visit Date:  No future appointments.    Health Maintenance   Topic Date Due    Hepatitis B vaccine (1 of 3 - 3-dose series) Never done    COVID-19 Vaccine (1) Never done    DTaP/Tdap/Td vaccine (1 - Tdap) Never done    Shingles vaccine (1 of 2) Never done    Low dose CT lung screening &/or counseling  Never done    Annual Wellness Visit (Medicare Advantage)  01/01/2024    Flu vaccine (Season Ended) 08/01/2024    Depression Screen  09/21/2024    Colorectal Cancer Screen  02/21/2026    Lipids  07/26/2028    Pneumococcal 0-64 years Vaccine  Completed    Hepatitis C screen  Completed    HIV screen  Completed    Hepatitis A vaccine  Aged Out    Hib vaccine  Aged Out    Polio vaccine  Aged Out    Meningococcal (ACWY) vaccine  Aged Out    A1C test (Diabetic or Prediabetic)  Discontinued    Diabetes screen  Discontinued       Hemoglobin A1C (%)   Date Value   10/19/2021 5.5   10/04/2020 5.8   02/16/2018 5.8             ( goal A1C is < 7)   No components found for: \"LABMICR\"  LDL Cholesterol (mg/dL)   Date Value   07/26/2023 93   10/04/2020 95       (goal LDL is <100)   AST (U/L)   Date Value   07/26/2023 19     ALT (U/L)   Date Value   07/26/2023 14     BUN (mg/dL)   Date Value   07/26/2023 14     BP Readings from Last 3 Encounters:   09/21/23 138/72   07/14/23 (!) 150/84   06/29/23 139/77          (goal 120/80)    All Future Testing planned in [facility]  Lab Frequency Next Occurrence   CT Lung Screen (Annual/Baseline) Once 06/29/2023   Hemoglobin A1C Once 07/14/2023   Urinalysis with Microscopic Once 07/14/2023   CT Lung Screen (Initial/Annual/Baseline) Once 09/21/2023               Patient Active Problem List:     Essential hypertension     Sickle cell trait (HCC)     Primary osteoarthritis of knee     Portal vein

## 2024-04-15 NOTE — TELEPHONE ENCOUNTER
osteoarthritis of knee     Portal vein thrombosis, june 2017, completed course of warfarin      Gastroesophageal reflux disease without esophagitis     Chronic pain of right ankle     Diverticulosis     Polyp of sigmoid colon     Adenomatous polyp of descending colon     Rectal polyp     History of colon polyps     Alcoholism (HCC)

## 2024-04-16 RX ORDER — FLUTICASONE PROPIONATE 50 MCG
2 SPRAY, SUSPENSION (ML) NASAL DAILY
Qty: 16 G | Refills: 0 | Status: SHIPPED | OUTPATIENT
Start: 2024-04-16

## 2024-04-16 RX ORDER — CLOTRIMAZOLE AND BETAMETHASONE DIPROPIONATE 10; .64 MG/G; MG/G
CREAM TOPICAL
Qty: 45 G | Refills: 1 | Status: SHIPPED | OUTPATIENT
Start: 2024-04-16

## 2024-04-16 RX ORDER — GABAPENTIN 300 MG/1
300 CAPSULE ORAL 3 TIMES DAILY
Qty: 90 CAPSULE | Refills: 0 | Status: SHIPPED | OUTPATIENT
Start: 2024-04-16 | End: 2024-05-16

## 2024-06-30 NOTE — PATIENT INSTRUCTIONS
Please schedule an eye appointment as soon as possible    Please have TDAP injection at your pharmacy---paper script given to patient    Return appointment card and Summary of Care was reviewed and copy was given to the patient.   WILLIE Supervisory note:  Patient seen in conjunction with TONI Lenz.  Patient presents with right-sided back pain.  It initially started as a ball under his right shoulder blade.  The pain then began to radiate down his right arm.  Patient reports that about a year ago, he had a heart attack and received a stent from his cardiologist, Dr. Miner.  He discussed his current symptoms with cardiology who recommended that he be evaluated in the emergency department.  His symptoms initially could be improved positionally but have now been constant.  On examination, palpation does not reproduce symptoms.  Cardiac and respiratory rates are unremarkable.    Laboratory studies without acute findings.  Chest x-ray without acute findings.  Patient is felt to be at low risk for major adverse cardiac event in the next 6 weeks by heart score.  Acute coronary syndrome, PE, aortic dissection, esophageal perforation, pneumothorax felt very unlikely as etiology of symptoms.  Patient treated symptomatically with steroids and was advised to follow-up with primary care physician.  Return precautions given for any worsening symptoms.  I personally saw the patient and made/approved the management plan and take responsiblity for the patient management.  Parts of this chart were completed with dictation software, please excuse any errors in transcription.     Miguel Coyle MD  06/30/24 [id number]

## 2024-07-02 DIAGNOSIS — I10 ESSENTIAL HYPERTENSION: ICD-10-CM

## 2024-07-02 RX ORDER — LOSARTAN POTASSIUM AND HYDROCHLOROTHIAZIDE 12.5; 5 MG/1; MG/1
1 TABLET ORAL DAILY
Qty: 90 TABLET | Refills: 1 | Status: SHIPPED | OUTPATIENT
Start: 2024-07-02

## 2024-07-02 NOTE — TELEPHONE ENCOUNTER
Leonel Arce Sr. is calling to request a refill on the following medication(s):    Medication Request:  Requested Prescriptions     Pending Prescriptions Disp Refills    losartan-hydroCHLOROthiazide (HYZAAR) 50-12.5 MG per tablet 90 tablet 1     Sig: Take 1 tablet by mouth daily       Last Visit Date (If Applicable):  9/21/2023    Next Visit Date:    Visit date not found

## 2024-08-14 DIAGNOSIS — I10 ESSENTIAL HYPERTENSION: ICD-10-CM

## 2024-08-14 RX ORDER — LOSARTAN POTASSIUM AND HYDROCHLOROTHIAZIDE 12.5; 5 MG/1; MG/1
1 TABLET ORAL DAILY
Qty: 90 TABLET | Refills: 1 | Status: SHIPPED | OUTPATIENT
Start: 2024-08-14

## 2024-08-16 ENCOUNTER — TELEPHONE (OUTPATIENT)
Dept: INTERNAL MEDICINE | Age: 59
End: 2024-08-16

## 2024-08-16 NOTE — TELEPHONE ENCOUNTER
PC to pt to schedule f/u appt with PCP and AWV, number is no longer in service.    PC to pt's daughter HONEY Joshi, number is also not in service.    Mailing letter.   [facility] GERIATRICS DISCHARGE SUMMARY  PATIENT'S NAME: Anna Marie Babb  YOB: 1937  MEDICAL RECORD NUMBER:  [id number]  Place of Service where encounter took place:  [facility] (TCU) [[id number]]    PRIMARY CARE PROVIDER AND CLINIC RESPONSIBLE AFTER TRANSFER:   Saman Meade, [address] / [place] [address]    Non-G Provider     Transferring providers: Dank Garcia NP, Dr. Sarahi MD  Recent Hospitalization/ED:  [facility] Hospital stay 8/7/23 to 8/16/23.  Date of SNF Admission:  8/16/23  Date of SNF (anticipated) Discharge: September 02, 2023  Discharged to: previous independent home  Cognitive Scores: SLUMS: 15/30  Physical Function: Ambulating >300 ft with FWW  DME: No new DME needed    CODE STATUS/ADVANCE DIRECTIVES DISCUSSION:  Prior   ALLERGIES: Amoxicillin-pot clavulanate, Ciprofloxacin, and Gluten meal    HPI  This is a [age]-year-old female with a past medical history of hypertension who presented after mechanical fall, found to have a right femoral neck fracture underwent surgical repair.  Given weightbearing as tolerated, Tylenol and oxycodone for pain control, low-dose aspirin x30 days for DVT prophylaxis and sustained mild ABLA with discharge hemoglobin 11.4.  Additionally found to have hyponatremia, improved with IVF, discharge .  No other changes noted, [facility]      Summary of nursing facility stay:     (S72.001A) Closed fracture of neck of right femur  WBAT, continue vit D supplementation, follow-up with Ortho (TBD). XRs     (R52) Pain  Using Tylenol 1000 mg 3 times daily, lidocaine patch and encourage icing     (Z79.01) Anticoagulated  Continue  mg daily x30 days, will complete course on 9/16     (I10) Essential hypertension, benign  Continue lisinopril 20 mg daily and amlodipine 5 mg every evening, -130, HR <80s     Renal function  Last CR 0.71 with GFR >60 on 8/29      (E87.1) Hyponatremia    Last  on 8/29     (D62) ABLA (acute blood loss anemia)  Last Hgb 11.4 on 8/21     Constipation  Continue senna S 1 tab twice daily as needed     (K90.0) Celiac disease  Continue gluten-free diet     Insomnia  Continue melatonin 5 mg at bedtime as needed    Discharge Medications:    Current Outpatient Medications   Medication Sig Dispense Refill    acetaminophen (TYLENOL) 500 MG tablet Take 1,000 mg by mouth 3 times daily      amLODIPine (NORVASC) 5 MG tablet Take 5 mg by mouth every evening      aspirin (ASA) 325 MG EC tablet Take 1 tablet (325 mg) by mouth daily for 30 days (Patient taking differently: Take 325 mg by mouth daily Complete on 9/16) 30 tablet 0    Lidocaine (LIDOCARE) 4 % Patch Place 1 patch onto the skin every 24 hours To prevent lidocaine toxicity, patient should be patch free for 12 hrs daily. Apply on R hip      lisinopril (ZESTRIL) 20 MG tablet Take 20 mg by mouth every evening      senna-docusate (SENOKOT-S/PERICOLACE) 8.6-50 MG tablet Take 1 tablet by mouth 2 times daily as needed for constipation 20 tablet     vitamin D3 (CHOLECALCIFEROL) 50 mcg (2000 units) tablet Take 1 tablet (50 mcg) by mouth daily 30 tablet       Controlled medications:   not applicable/none     Past Medical History:   Past Medical History:   Diagnosis Date    Atrophic vaginitis     Celiac disease     Cystocele     Disorder of bone and cartilage, unspecified     osteopenia    Endometriosis, site unspecified     Essential hypertension, benign     Gastro-oesophageal reflux disease     Hiatal hernia     3 cm sliding hiatal hernia    Hypercalcemia     Hyperlipidemia     HZV (herpes zoster virus) post herpetic neuralgia     Microscopic colitis     Nonspecific abnormal results of liver function study     Mildly elevated transaminases, liver biopsy WNL    Peripheral neuropathy     Pulmonary nodule     Zinc deficiency      Physical Exam:   Vitals: /59   Pulse 68   Temp 97.3  F (36.3  C)    "Resp 16   Ht 1.676 m (5' 6\")   Wt 53.8 kg (118 lb 9.6 oz)   SpO2 98%   BMI 19.14 kg/m    BMI: Body mass index is 19.14 kg/m .  GENERAL APPEARANCE:  Alert, in no distress, oriented, cooperative, denies pain  RESP:  lungs clear to auscultation , RA  CV:  regular rate and rhythm, no murmur, rub, or gallop, no edema  PSYCH:  oriented X 3, SLUMS 15/30    SNF labs: Most Recent 3 CBC's:  Recent Labs   Lab Test 08/21/23  0842 08/14/23  1902 08/14/23  0649 08/11/23  0702 08/09/23  0627 08/07/23  2017 08/25/15  1850   0000   WBC 5.7  --   --   --   --  10.1 6.5  --    HGB 11.4* 11.4*  --  12.6   < > 12.9 13.0  --    *  --   --   --   --  102* 98  --      --  283 253  --  265 265   < >    < > = values in this interval not displayed.     Most Recent 3 BMP's:  Recent Labs   Lab Test 08/29/23  0840 08/23/23  0750 08/21/23  0842   * 134* 131*   POTASSIUM 4.5 4.2 4.1   CHLORIDE 100 100 97*   CO2 24 24 23   BUN 11.4 13.2 10.4   CR 0.69 0.71 0.67   ANIONGAP 10 10 11   KARTHIKEYAN 9.7 9.5 9.6   GLC 73 68* 75       DISCHARGE PLAN:  Follow up labs: No labs orders/due  Medical Follow Up:      Follow up with primary care provider in 1-2 weeks  [facility] scheduled appointments:     Discharge Services: No therapy or home care recommended.   Discharge Instructions Verbalized to Patient at Discharge:   None    TOTAL DISCHARGE TIME:   Less than or equal to 30 minutes  Electronically signed by:  Dank Garcia NP                 "

## 2024-10-08 NOTE — TELEPHONE ENCOUNTER
Patient previously had a [facility] referral which has been closed. Today Patient left me voicemail, and I returned his call. He has called the [address] [facility]) in regards to [address] submitted in December, but he has not received a call back. He's still homeless and living in his truck; he occasionally stays with a frend. He also submitted a [employer] rental assistance application because he found a landlord who will rent to him. Patient needs the rental assistance to help with security deposit. When he tries to login to [employer] rental application account, it says the password is wrong. He's going to try \"Forget your password\" to reset the password. He attended the [employer] homeAstrostaryer class on 1/21/23 in order to explore other housing options. His employer switched pay day, so payment arrangements the Patient had set up had to be cancelled. He's concerned about his credit. I encouraged Patient to call [facility] back next week. I have also been trying to contact the [facility] to find out the status of Patient's SPDAT. I've let Patient know that housing assistance can take several months for a determination. [phone]

## 2024-10-30 ENCOUNTER — TELEPHONE (OUTPATIENT)
Dept: INTERNAL MEDICINE | Age: 59
End: 2024-10-30

## 2024-10-30 NOTE — TELEPHONE ENCOUNTER
----- Message from Analy MADERA sent at 10/30/2024  9:57 AM EDT -----  Regarding: ECC Appointment Request  ECC Appointment Request    Patient needs appointment for ECC Appointment Type: Annual Visit.    Patient Requested Dates(s): as soon as possible   Patient Requested Time: after 9:00 am   Provider Name: Justice Pratt    Reason for Appointment Request: Established Patient - Available appointments did not meet patient need.  Patient supposed to have appointment today but was being cancelled and patient wanted to reschedule it as soon as possible.     Patient also wanted to know if the order for the chest CT scan was still open.   --------------------------------------------------------------------------------------------------------------------------    Relationship to Patient: Self     Call Back Information: OK to leave message on voicemail  Preferred Call Back Number: Phone [phone]

## 2024-12-10 ENCOUNTER — OFFICE VISIT (OUTPATIENT)
Dept: INTERNAL MEDICINE | Age: 59
End: 2024-12-10
Payer: COMMERCIAL

## 2024-12-10 VITALS
TEMPERATURE: 97.2 F | HEART RATE: 78 BPM | OXYGEN SATURATION: 98 % | BODY MASS INDEX: 25.76 KG/M2 | DIASTOLIC BLOOD PRESSURE: 83 MMHG | SYSTOLIC BLOOD PRESSURE: 160 MMHG | WEIGHT: 184 LBS | HEIGHT: 71 IN

## 2024-12-10 DIAGNOSIS — K04.7 INFECTED TOOTH: Primary | ICD-10-CM

## 2024-12-10 DIAGNOSIS — I10 ESSENTIAL HYPERTENSION: ICD-10-CM

## 2024-12-10 DIAGNOSIS — B35.6 JOCK ITCH: ICD-10-CM

## 2024-12-10 DIAGNOSIS — F10.20 ALCOHOLISM (HCC): ICD-10-CM

## 2024-12-10 DIAGNOSIS — I10 ESSENTIAL (PRIMARY) HYPERTENSION: ICD-10-CM

## 2024-12-10 DIAGNOSIS — N52.9 ERECTILE DYSFUNCTION, UNSPECIFIED ERECTILE DYSFUNCTION TYPE: ICD-10-CM

## 2024-12-10 DIAGNOSIS — E55.9 VITAMIN D DEFICIENCY: ICD-10-CM

## 2024-12-10 DIAGNOSIS — T78.40XA ALLERGY, INITIAL ENCOUNTER: ICD-10-CM

## 2024-12-10 DIAGNOSIS — K21.9 GASTROESOPHAGEAL REFLUX DISEASE WITHOUT ESOPHAGITIS: ICD-10-CM

## 2024-12-10 DIAGNOSIS — R23.8 SKIN BREAKDOWN: ICD-10-CM

## 2024-12-10 DIAGNOSIS — D57.3 SICKLE CELL TRAIT (HCC): ICD-10-CM

## 2024-12-10 DIAGNOSIS — Z59.819 HOUSING INSECURITY: ICD-10-CM

## 2024-12-10 PROCEDURE — 99211 OFF/OP EST MAY X REQ PHY/QHP: CPT | Performed by: INTERNAL MEDICINE

## 2024-12-10 PROCEDURE — 3077F SYST BP >= 140 MM HG: CPT | Performed by: INTERNAL MEDICINE

## 2024-12-10 PROCEDURE — 3078F DIAST BP <80 MM HG: CPT | Performed by: INTERNAL MEDICINE

## 2024-12-10 PROCEDURE — 99213 OFFICE O/P EST LOW 20 MIN: CPT | Performed by: INTERNAL MEDICINE

## 2024-12-10 RX ORDER — CHOLECALCIFEROL (VITAMIN D3) 25 MCG
1000 TABLET ORAL DAILY
Qty: 30 TABLET | Refills: 5 | Status: CANCELLED | OUTPATIENT
Start: 2024-12-10

## 2024-12-10 RX ORDER — METOPROLOL TARTRATE 25 MG/1
25 TABLET, FILM COATED ORAL 2 TIMES DAILY
Qty: 120 TABLET | Refills: 5 | Status: SHIPPED | OUTPATIENT
Start: 2024-12-10

## 2024-12-10 RX ORDER — CHLORAL HYDRATE 500 MG
1000 CAPSULE ORAL
Qty: 90 CAPSULE | Refills: 1 | Status: CANCELLED | OUTPATIENT
Start: 2024-12-10

## 2024-12-10 RX ORDER — GABAPENTIN 300 MG/1
300 CAPSULE ORAL 3 TIMES DAILY
Qty: 90 CAPSULE | Refills: 0 | Status: SHIPPED | OUTPATIENT
Start: 2024-12-10 | End: 2025-01-09

## 2024-12-10 RX ORDER — SILDENAFIL 50 MG/1
50 TABLET, FILM COATED ORAL PRN
Qty: 30 TABLET | Refills: 0 | Status: SHIPPED | OUTPATIENT
Start: 2024-12-10

## 2024-12-10 RX ORDER — CLOTRIMAZOLE AND BETAMETHASONE DIPROPIONATE 10; .64 MG/G; MG/G
CREAM TOPICAL
Qty: 45 G | Refills: 1 | Status: CANCELLED | OUTPATIENT
Start: 2024-12-10

## 2024-12-10 RX ORDER — ZINC OXIDE 10 G/100G
SPRAY TOPICAL
Qty: 170 G | Refills: 5 | Status: CANCELLED | OUTPATIENT
Start: 2024-12-10

## 2024-12-10 RX ORDER — FLUTICASONE PROPIONATE 50 MCG
2 SPRAY, SUSPENSION (ML) NASAL DAILY
Qty: 16 G | Refills: 0 | Status: CANCELLED | OUTPATIENT
Start: 2024-12-10

## 2024-12-10 RX ORDER — LOSARTAN POTASSIUM AND HYDROCHLOROTHIAZIDE 12.5; 5 MG/1; MG/1
1 TABLET ORAL DAILY
Qty: 90 TABLET | Refills: 1 | Status: SHIPPED | OUTPATIENT
Start: 2024-12-10

## 2024-12-10 RX ORDER — ASPIRIN 81 MG/1
81 TABLET ORAL DAILY
Qty: 30 TABLET | Refills: 5 | Status: SHIPPED | OUTPATIENT
Start: 2024-12-10

## 2024-12-10 RX ORDER — CLOTRIMAZOLE AND BETAMETHASONE DIPROPIONATE 10; .64 MG/G; MG/G
CREAM TOPICAL
Qty: 45 G | Refills: 1 | Status: SHIPPED | OUTPATIENT
Start: 2024-12-10

## 2024-12-10 RX ORDER — LANOLIN ALCOHOL/MO/W.PET/CERES
100 CREAM (GRAM) TOPICAL DAILY
Qty: 30 TABLET | Refills: 5 | Status: CANCELLED | OUTPATIENT
Start: 2024-12-10

## 2024-12-10 RX ORDER — FOLIC ACID 1 MG/1
1000 TABLET ORAL DAILY
Qty: 30 TABLET | Refills: 5 | Status: CANCELLED | OUTPATIENT
Start: 2024-12-10

## 2024-12-10 SDOH — ECONOMIC STABILITY: FOOD INSECURITY: WITHIN THE PAST 12 MONTHS, THE FOOD YOU BOUGHT JUST DIDN'T LAST AND YOU DIDN'T HAVE MONEY TO GET MORE.: NEVER TRUE

## 2024-12-10 SDOH — ECONOMIC STABILITY - HOUSING INSECURITY: HOUSING INSTABILITY UNSPECIFIED: Z59.819

## 2024-12-10 SDOH — ECONOMIC STABILITY: FOOD INSECURITY: WITHIN THE PAST 12 MONTHS, YOU WORRIED THAT YOUR FOOD WOULD RUN OUT BEFORE YOU GOT MONEY TO BUY MORE.: NEVER TRUE

## 2024-12-10 SDOH — ECONOMIC STABILITY: INCOME INSECURITY: HOW HARD IS IT FOR YOU TO PAY FOR THE VERY BASICS LIKE FOOD, HOUSING, MEDICAL CARE, AND HEATING?: VERY HARD

## 2024-12-10 ASSESSMENT — ENCOUNTER SYMPTOMS
GASTROINTESTINAL NEGATIVE: 1
RESPIRATORY NEGATIVE: 1
RHINORRHEA: 1

## 2024-12-10 ASSESSMENT — PATIENT HEALTH QUESTIONNAIRE - PHQ9
1. LITTLE INTEREST OR PLEASURE IN DOING THINGS: NOT AT ALL
2. FEELING DOWN, DEPRESSED OR HOPELESS: NOT AT ALL
SUM OF ALL RESPONSES TO PHQ QUESTIONS 1-9: 0
SUM OF ALL RESPONSES TO PHQ9 QUESTIONS 1 & 2: 0
SUM OF ALL RESPONSES TO PHQ QUESTIONS 1-9: 0

## 2024-12-10 NOTE — PROGRESS NOTES
86 07/26/2023 08:45 AM    GLUCOSE 132 05/08/2012 09:41 AM     Hemoglobin A1C:   Lab Results   Component Value Date/Time    LABA1C 5.5 10/19/2021 01:06 PM     Microalbumin Urine: No results found for: \"MICROALBUR\"  Lipid profile:   Lab Results   Component Value Date/Time    CHOL 161 07/26/2023 08:45 AM    TRIG 44 07/26/2023 08:45 AM    HDL 59 07/26/2023 08:45 AM     Thyroid functions:   Lab Results   Component Value Date/Time    TSH 1.04 11/11/2021 08:58 AM      Hepatic functions:   Lab Results   Component Value Date/Time    ALT 14 07/26/2023 08:45 AM    AST 19 07/26/2023 08:45 AM    BILITOT 0.5 07/26/2023 08:45 AM    BILIDIR 0.09 05/23/2022 09:12 AM     Urine Analysis: No results found for: \"LABURIN\"    HEALTH MAINTENANCE:      Health Maintenance Due   Topic Date Due    Lung Cancer Screening &/or Counseling  08/07/2018    Annual Wellness Visit (Medicare Advantage)  01/01/2024       ASSESSMENT AND PLAN:       Diagnosis Orders   1. Infected tooth  Magic Mouthwash (MIRACLE MOUTHWASH)      2. Sickle cell trait (HCC)  aspirin (RA ASPIRIN EC ADULT LOW ST) 81 MG EC tablet      3. Erectile dysfunction, unspecified erectile dysfunction type  sildenafil (VIAGRA) 50 MG tablet      4. Essential (primary) hypertension   metoprolol tartrate (LOPRESSOR) 25 MG tablet      5. Gastroesophageal reflux disease without esophagitis  omeprazole (PRILOSEC) 20 MG delayed release capsule      6. Allergy, initial encounter        7. Vitamin D deficiency        8. Essential hypertension  losartan-hydroCHLOROthiazide (HYZAAR) 50-12.5 MG per tablet      9. Jock itch  clotrimazole-betamethasone (LOTRISONE) 1-0.05 % cream      10. Skin breakdown        11. Alcoholism (HCC)        12. Housing insecurity  [facility] Referral for Social Determinants of Health (Primary Care Practices)        Going to resend all his prescriptions to his new pharmacy  Patient will follow-up in 2 weeks for blood pressure  Start using the Magic mouthwash and get follow-up with

## 2024-12-17 ENCOUNTER — TELEPHONE (OUTPATIENT)
Dept: INTERNAL MEDICINE | Age: 59
End: 2024-12-17

## 2024-12-17 NOTE — TELEPHONE ENCOUNTER
Leonel Arce Sr. was contacted by a Community Health Navigator to discuss a referral for SDOH related needs.     Writer spoke with: Patient and explained the services and assistance that can be provided by a Community Health Navigator.     Patient agreeable to receiving resources and support from writer.     Intake Notes:   Patient told me that 's let him go from housing program.    Rent was raised, and he can't afford it, so he has to be out of apartment by January 6, 2025.  He has a Section 8 voucher that is good until January 13, 2025.    He would like me to email him a list of subsidized housing.    [email or web address]  Has people to help him move but can't afford a truck.    Completed Getting Ahead class in [employer] in 2017.  Took [employer] class.  Credit score is just below what will allow him to get housing.  Receives SNAP.  He told me he has 14 days before teeth removed.  Only has 6 months left of school; he wants to go back and finish.      Actions to be completed by writer:  I will email patient lists of subsidized housing.    Actions to be completed by patient:  Patient will be aware of incoming email and follow up with subsidized housing providers.       DIONNE CLEMENTS MA

## 2025-01-08 NOTE — TELEPHONE ENCOUNTER
Leonel Arce Sr. was contacted by a Community Health Navigator for follow-up regarding SDOH-related needs.     Writer spoke with: Patient    Progress Notes: Patient cancelled dental surgery for now; too much going on.  Patient received email I sent with lists of subsidized housing providers.  Patient has not found housing yet and will probably have to let voucher .   He is still living with a friend for now.  Patient has a positive attitude talking about his tyron.  I'm closing referral; resources exhausted.      Actions to be completed by writer: Close referral.    Actions to be completed by patient:  Patient will continue seeking housing.    DIONNE CLEMENTS MA

## 2025-01-13 SDOH — ECONOMIC STABILITY: INCOME INSECURITY: IN THE LAST 12 MONTHS, WAS THERE A TIME WHEN YOU WERE NOT ABLE TO PAY THE MORTGAGE OR RENT ON TIME?: YES

## 2025-01-13 SDOH — ECONOMIC STABILITY: FOOD INSECURITY: WITHIN THE PAST 12 MONTHS, YOU WORRIED THAT YOUR FOOD WOULD RUN OUT BEFORE YOU GOT MONEY TO BUY MORE.: SOMETIMES TRUE

## 2025-01-13 SDOH — HEALTH STABILITY: PHYSICAL HEALTH: ON AVERAGE, HOW MANY DAYS PER WEEK DO YOU ENGAGE IN MODERATE TO STRENUOUS EXERCISE (LIKE A BRISK WALK)?: 4 DAYS

## 2025-01-13 SDOH — ECONOMIC STABILITY: FOOD INSECURITY: WITHIN THE PAST 12 MONTHS, THE FOOD YOU BOUGHT JUST DIDN'T LAST AND YOU DIDN'T HAVE MONEY TO GET MORE.: SOMETIMES TRUE

## 2025-01-13 SDOH — HEALTH STABILITY: PHYSICAL HEALTH: ON AVERAGE, HOW MANY MINUTES DO YOU ENGAGE IN EXERCISE AT THIS LEVEL?: 100 MIN

## 2025-01-13 ASSESSMENT — LIFESTYLE VARIABLES
HOW OFTEN DO YOU HAVE SIX OR MORE DRINKS ON ONE OCCASION: 3
HOW OFTEN DO YOU HAVE A DRINK CONTAINING ALCOHOL: 4
HOW OFTEN DURING THE LAST YEAR HAVE YOU HAD A FEELING OF GUILT OR REMORSE AFTER DRINKING: NEVER
HOW OFTEN DURING THE LAST YEAR HAVE YOU FAILED TO DO WHAT WAS NORMALLY EXPECTED FROM YOU BECAUSE OF DRINKING: NEVER
HOW OFTEN DURING THE LAST YEAR HAVE YOU NEEDED AN ALCOHOLIC DRINK FIRST THING IN THE MORNING TO GET YOURSELF GOING AFTER A NIGHT OF HEAVY DRINKING: NEVER
HAS A RELATIVE, FRIEND, DOCTOR, OR ANOTHER HEALTH PROFESSIONAL EXPRESSED CONCERN ABOUT YOUR DRINKING OR SUGGESTED YOU CUT DOWN: NO
HOW OFTEN DURING THE LAST YEAR HAVE YOU FOUND THAT YOU WERE NOT ABLE TO STOP DRINKING ONCE YOU HAD STARTED: NEVER
HOW OFTEN DURING THE LAST YEAR HAVE YOU BEEN UNABLE TO REMEMBER WHAT HAPPENED THE NIGHT BEFORE BECAUSE YOU HAD BEEN DRINKING: NEVER
HOW MANY STANDARD DRINKS CONTAINING ALCOHOL DO YOU HAVE ON A TYPICAL DAY: 1
HAS A RELATIVE, FRIEND, DOCTOR, OR ANOTHER HEALTH PROFESSIONAL EXPRESSED CONCERN ABOUT YOUR DRINKING OR SUGGESTED YOU CUT DOWN: NO
HAVE YOU OR SOMEONE ELSE BEEN INJURED AS A RESULT OF YOUR DRINKING: NO
HOW OFTEN DURING THE LAST YEAR HAVE YOU FAILED TO DO WHAT WAS NORMALLY EXPECTED FROM YOU BECAUSE OF DRINKING: NEVER
HAVE YOU OR SOMEONE ELSE BEEN INJURED AS A RESULT OF YOUR DRINKING: NO
HOW OFTEN DURING THE LAST YEAR HAVE YOU BEEN UNABLE TO REMEMBER WHAT HAPPENED THE NIGHT BEFORE BECAUSE YOU HAD BEEN DRINKING: NEVER
HOW MANY STANDARD DRINKS CONTAINING ALCOHOL DO YOU HAVE ON A TYPICAL DAY: 1 OR 2
HOW OFTEN DO YOU HAVE A DRINK CONTAINING ALCOHOL: 2-3 TIMES A WEEK
HOW OFTEN DURING THE LAST YEAR HAVE YOU NEEDED AN ALCOHOLIC DRINK FIRST THING IN THE MORNING TO GET YOURSELF GOING AFTER A NIGHT OF HEAVY DRINKING: NEVER
HOW OFTEN DURING THE LAST YEAR HAVE YOU HAD A FEELING OF GUILT OR REMORSE AFTER DRINKING: NEVER
HOW OFTEN DURING THE LAST YEAR HAVE YOU FOUND THAT YOU WERE NOT ABLE TO STOP DRINKING ONCE YOU HAD STARTED: NEVER

## 2025-01-13 ASSESSMENT — PATIENT HEALTH QUESTIONNAIRE - PHQ9
SUM OF ALL RESPONSES TO PHQ9 QUESTIONS 1 & 2: 0
1. LITTLE INTEREST OR PLEASURE IN DOING THINGS: NOT AT ALL
SUM OF ALL RESPONSES TO PHQ QUESTIONS 1-9: 0
2. FEELING DOWN, DEPRESSED OR HOPELESS: NOT AT ALL
SUM OF ALL RESPONSES TO PHQ QUESTIONS 1-9: 0

## 2025-01-14 ENCOUNTER — OFFICE VISIT (OUTPATIENT)
Dept: INTERNAL MEDICINE | Age: 60
End: 2025-01-14
Payer: COMMERCIAL

## 2025-01-14 VITALS
DIASTOLIC BLOOD PRESSURE: 60 MMHG | HEIGHT: 71 IN | BODY MASS INDEX: 24.56 KG/M2 | WEIGHT: 175.4 LBS | SYSTOLIC BLOOD PRESSURE: 130 MMHG | HEART RATE: 72 BPM

## 2025-01-14 DIAGNOSIS — I10 ESSENTIAL HYPERTENSION: ICD-10-CM

## 2025-01-14 DIAGNOSIS — Z87.891 PERSONAL HISTORY OF TOBACCO USE: ICD-10-CM

## 2025-01-14 DIAGNOSIS — Z00.00 MEDICARE ANNUAL WELLNESS VISIT, SUBSEQUENT: Primary | ICD-10-CM

## 2025-01-14 PROCEDURE — 3078F DIAST BP <80 MM HG: CPT | Performed by: INTERNAL MEDICINE

## 2025-01-14 PROCEDURE — G0439 PPPS, SUBSEQ VISIT: HCPCS | Performed by: INTERNAL MEDICINE

## 2025-01-14 PROCEDURE — 3075F SYST BP GE 130 - 139MM HG: CPT | Performed by: INTERNAL MEDICINE

## 2025-01-14 PROCEDURE — 99213 OFFICE O/P EST LOW 20 MIN: CPT | Performed by: INTERNAL MEDICINE

## 2025-01-14 PROCEDURE — G0296 VISIT TO DETERM LDCT ELIG: HCPCS | Performed by: INTERNAL MEDICINE

## 2025-01-14 PROCEDURE — 99211 OFF/OP EST MAY X REQ PHY/QHP: CPT | Performed by: INTERNAL MEDICINE

## 2025-01-14 SDOH — ECONOMIC STABILITY: FOOD INSECURITY: WITHIN THE PAST 12 MONTHS, THE FOOD YOU BOUGHT JUST DIDN'T LAST AND YOU DIDN'T HAVE MONEY TO GET MORE.: NEVER TRUE

## 2025-01-14 SDOH — ECONOMIC STABILITY: FOOD INSECURITY: WITHIN THE PAST 12 MONTHS, YOU WORRIED THAT YOUR FOOD WOULD RUN OUT BEFORE YOU GOT MONEY TO BUY MORE.: NEVER TRUE

## 2025-01-14 NOTE — PROGRESS NOTES
Medicare Annual Wellness Visit    Leonel Arce Sr. is here for Medicare AWV (Last AWV 9.21.2023) and Hypertension (1 month f/u, pt does not check BP)    Assessment & Plan   Medicare annual wellness visit, subsequent  Personal history of tobacco use     No follow-ups on file.     Subjective   The following acute and/or chronic problems were also addressed today:  Hypertension  Patient is here for follow-up of elevated blood pressure. He is exercising and is adherent to a low-salt diet. Blood pressure is well controlled at home. Cardiac symptoms: none. Patient denies chest pain, dyspnea, and fatigue. Cardiovascular risk factors: hypertension and male gender. Use of agents associated with hypertension: none. History of target organ damage: none.    Patient's complete Health Risk Assessment and screening values have been reviewed and are found in Flowsheets. The following problems were reviewed today and where indicated follow up appointments were made and/or referrals ordered.    Positive Risk Factor Screenings with Interventions:       Alcohol Screening:  AUDIT-C Score: 5  AUDIT Total Score: 5  Total Score Interpretation: 0-7 suggests low risk alcohol consumption but assess individual risks   Interventions:  Patient declined any further intervention or treatment    Marijuana Use  Patient declined any further intervention or treatment               Safety:  Do you have non-slip mats or non-slip surfaces or shower bars or grab bars in your shower or bathtub?: (!) No (Education provided)  Interventions:  Patient declined any further interventions or treatment     Advanced Directives:  Do you have a Living Will?: (!) No (education provided)    Intervention:  has NO advanced directive - not interested in additional information        Tobacco Use:    Tobacco Use      Smoking status: Every Day        Packs/day: 1.00        Years: 1 pack/day for 39.1 years (39.1 ttl pk-yrs)        Types: Cigarettes        Start date:

## 2025-01-14 NOTE — PATIENT INSTRUCTIONS
[facility] Housing Resources*  (Call [facility]/211 if need more resources)    Area Office on Aging of [facility] ([facility]):  What they offer: Senior housing search on website, home repair, and referral to community resources for people ages 60+.  Phone Number: [phone]    [facility] ([place])  What they offer: Assistance with tenants' rights, eviction, foreclosure.  Phone Number: [phone] or [phone]  [facility]  [facility]:  What they offer: Reentry Transition Services, including housing and other needs.  Phone Number: [phone]  [facility] ([facility]):  What they offer: Homelessness and housing programs.  Phone Number: [phone]  [facility]   [phone]  Homeless shelter   [facility]  Women's shelter   [phone]  [facility] [place]   Family homeless shelter  Contact [facility] 211 to complete intake.  [facility]   Family homeless shelter   Contact [facility] 211 to complete intake.    [facility]   Men Homeless shelter   Contact [facility] 211 to complete intake.    [facility]   Women and children homeless shelter  [phone]   [facility] Men's Shelter  Homeless shelter for Men  [phone] Voice    [facility] Community Action Commission ([facility] and [place]):  What they offer: referral to community transportation and other resources.  Phone Number: [phone]  [facility] ([facility]):  What they offer: Shelter and advocacy services for victims of domestic violence.  Phone Number: [phone] (24/7 line, select option #2).  [facility] ([facility]):  What they offer: Advocacy services for renters and homeowners.  Phone Number: [phone]  [facility]:   What they Offer: Eviction prevention resources and intake for all area emergency shelters.  Phone Number: 211 or [phone]

## 2025-04-29 DIAGNOSIS — Z72.0 TOBACCO ABUSE: ICD-10-CM

## 2025-04-29 NOTE — TELEPHONE ENCOUNTER
..Request for   Requested Prescriptions     Pending Prescriptions Disp Refills    nicotine (NICODERM CQ) 21 MG/24HR 42 patch 0     Sig: Place 1 patch onto the skin daily    .      Please review and e-scribe to pharmacy listed in chart if appropriate. Thank you.      Last Visit Date: 1/14/2025  Next Visit Date: Visit date not found    Future Appointments   Date Time Provider Department Center   5/13/2025  2:20 PM Justice Valle MD [facility] ECC DEP       Health Maintenance   Topic Date Due    Lung Cancer Screening &/or Counseling  08/07/2018    Hepatitis B vaccine (1 of 3 - 19+ 3-dose series) 12/10/2025 (Originally 11/27/1986)    DTaP/Tdap/Td vaccine (1 - Tdap) 12/10/2025 (Originally 11/27/1986)    Flu vaccine (Season Ended) 12/10/2025 (Originally 8/1/2025)    Shingles vaccine (1 of 2) 12/10/2025 (Originally 11/27/2017)    COVID-19 Vaccine (1 - 2024-25 season) 12/10/2025 (Originally 9/1/2024)    Depression Screen  01/13/2026    Colorectal Cancer Screen  02/21/2026    Lipids  07/26/2028    Annual Wellness Visit (Medicare Advantage)  Completed    Pneumococcal 50+ years Vaccine  Completed    Hepatitis C screen  Completed    HIV screen  Completed    Hepatitis A vaccine  Aged Out    Hib vaccine  Aged Out    Polio vaccine  Aged Out    Meningococcal (ACWY) vaccine  Aged Out    Meningococcal B vaccine  Aged Out    A1C test (Diabetic or Prediabetic)  Discontinued    Pneumococcal 0-49 years Vaccine  Discontinued    Diabetes screen  Discontinued       Hemoglobin A1C (%)   Date Value   10/19/2021 5.5   10/04/2020 5.8   02/16/2018 5.8             ( goal A1C is < 7)   No components found for: \"LABMICR\"  No components found for: \"LDLCHOLESTEROL\", \"LDLCALC\"    (goal LDL is <100)   AST (U/L)   Date Value   07/26/2023 19     ALT (U/L)   Date Value   07/26/2023 14     BUN (mg/dL)   Date Value   07/26/2023 14     BP Readings from Last 3 Encounters:   01/14/25 130/60   12/10/24 (!) 160/83   09/21/23 138/72          (goal 120/80)    All

## 2025-04-30 RX ORDER — NICOTINE 21 MG/24HR
1 PATCH, TRANSDERMAL 24 HOURS TRANSDERMAL DAILY
Qty: 42 PATCH | Refills: 0 | Status: SHIPPED | OUTPATIENT
Start: 2025-04-30 | End: 2025-06-11

## 2025-05-13 ENCOUNTER — OFFICE VISIT (OUTPATIENT)
Age: 60
End: 2025-05-13
Payer: COMMERCIAL

## 2025-05-13 VITALS
WEIGHT: 168.6 LBS | TEMPERATURE: 98 F | BODY MASS INDEX: 23.51 KG/M2 | SYSTOLIC BLOOD PRESSURE: 133 MMHG | HEART RATE: 58 BPM | OXYGEN SATURATION: 95 % | DIASTOLIC BLOOD PRESSURE: 60 MMHG

## 2025-05-13 DIAGNOSIS — I10 ESSENTIAL HYPERTENSION: ICD-10-CM

## 2025-05-13 DIAGNOSIS — R21 RASH: ICD-10-CM

## 2025-05-13 DIAGNOSIS — K08.109 NO NATURAL TEETH: Primary | ICD-10-CM

## 2025-05-13 PROCEDURE — 99214 OFFICE O/P EST MOD 30 MIN: CPT | Performed by: INTERNAL MEDICINE

## 2025-05-13 PROCEDURE — 3078F DIAST BP <80 MM HG: CPT | Performed by: INTERNAL MEDICINE

## 2025-05-13 PROCEDURE — 3075F SYST BP GE 130 - 139MM HG: CPT | Performed by: INTERNAL MEDICINE

## 2025-05-13 RX ORDER — LACTOSE-REDUCED FOOD
LIQUID (ML) ORAL
Qty: 30 EACH | Refills: 1 | Status: SHIPPED | OUTPATIENT
Start: 2025-05-13

## 2025-05-13 RX ORDER — COLLOIDAL OATMEAL 1 %
CREAM (GRAM) TOPICAL
Qty: 156 G | Refills: 0 | Status: SHIPPED | OUTPATIENT
Start: 2025-05-13

## 2025-05-13 ASSESSMENT — ENCOUNTER SYMPTOMS
SORE THROAT: 0
ABDOMINAL PAIN: 0
SHORTNESS OF BREATH: 0
BACK PAIN: 0
SINUS PRESSURE: 0
SINUS PAIN: 0
COUGH: 0
COLOR CHANGE: 1
ABDOMINAL DISTENTION: 0
PHOTOPHOBIA: 0

## 2025-05-13 NOTE — PROGRESS NOTES
..[facility]   Progress Note        Date of patient's visit: 5/13/2025  Patient's Name:  Leonel Arce Sr.                   YOB: 1965        PCP:  Justice Valle MD    Leonel Arce Sr. is a 59 y.o. male who presents for   Chief Complaint   Patient presents with    Weight Loss    Skin Problem    and follow up of chronic medical problems.  Patient Active Problem List   Diagnosis    Essential hypertension    Sickle cell trait    Primary osteoarthritis of knee    Portal vein thrombosis, june 2017, completed course of warfarin     Gastroesophageal reflux disease without esophagitis    Chronic pain of right ankle    Diverticulosis    Polyp of sigmoid colon    Adenomatous polyp of descending colon    Rectal polyp    History of colon polyps    Alcoholism (HCC)       HISTORY OF PRESENT ILLNESS:    History was obtained from the patient.   59-year-old male here for a routine follow-up.  He has a history of hypertension with well-controlled.  Patient recently had tooth extractions.  All of his teeth have been removed at this time.  Unfortunately his dental insurance changed and will not fully cover dentures.  He has lost some weight due to an inability to eat solid food.  He reports he is trying to get him and able to eat some soft items.  He is talking with the insurance company and dentist to resolve this.  Patient stopped drinking alcohol since January and has a place to live now.  He reports for the last 1 month he has had a dry itchy rash along his beard and forehead.  Patient's allergies, medications, past medical, surgical, social and family histories were reviewed and updated as appropriate.    ALLERGIES    No Known Allergies      MEDICATIONS:      Current Outpatient Medications   Medication Sig Dispense Refill    nicotine (NICODERM CQ) 21 MG/24HR Place 1 patch onto the skin daily 42 patch 0    aspirin (RA ASPIRIN EC ADULT LOW ST) 81 MG EC tablet Take 1 tablet by mouth daily 30

## 2025-06-06 ENCOUNTER — TELEPHONE (OUTPATIENT)
Age: 60
End: 2025-06-06

## 2025-06-06 RX ORDER — IBUPROFEN 800 MG/1
800 TABLET, FILM COATED ORAL EVERY 8 HOURS PRN
COMMUNITY
End: 2025-06-06 | Stop reason: SDUPTHER

## 2025-06-06 NOTE — TELEPHONE ENCOUNTER
Patient came into the clinic to request a lab order for a BMP that his insurance is requesting.     He called to get it faxed while in office but was placed on hold with them for about 65 minutes and gave up.    Electronically signed by Oralia Sanchez on 6/6/2025 at 2:04 PM

## 2025-06-06 NOTE — TELEPHONE ENCOUNTER
Leonle Arce Sr. is calling to request a refill on the following medication(s):    Medication Request:  Requested Prescriptions     Pending Prescriptions Disp Refills    ibuprofen (ADVIL;MOTRIN) 800 MG tablet 120 tablet 0     Sig: Take 1 tablet by mouth every 8 hours as needed for Pain       Last Visit Date (If Applicable):  5/13/2025    Next Visit Date:    Visit date not found

## 2025-06-09 RX ORDER — IBUPROFEN 800 MG/1
800 TABLET, FILM COATED ORAL EVERY 8 HOURS PRN
Qty: 120 TABLET | Refills: 0 | Status: SHIPPED | OUTPATIENT
Start: 2025-06-09

## 2025-06-17 ENCOUNTER — OFFICE VISIT (OUTPATIENT)
Age: 60
End: 2025-06-17
Payer: COMMERCIAL

## 2025-06-17 ENCOUNTER — HOSPITAL ENCOUNTER (OUTPATIENT)
Age: 60
Setting detail: SPECIMEN
Discharge: HOME | End: 2025-06-17

## 2025-06-17 VITALS
TEMPERATURE: 98.4 F | DIASTOLIC BLOOD PRESSURE: 63 MMHG | WEIGHT: 172 LBS | HEART RATE: 58 BPM | OXYGEN SATURATION: 96 % | SYSTOLIC BLOOD PRESSURE: 117 MMHG | HEIGHT: 71 IN | BODY MASS INDEX: 24.08 KG/M2

## 2025-06-17 DIAGNOSIS — E55.9 VITAMIN D DEFICIENCY, UNSPECIFIED: ICD-10-CM

## 2025-06-17 DIAGNOSIS — R27.8 DYSGRAPHIA: Primary | ICD-10-CM

## 2025-06-17 DIAGNOSIS — I10 ESSENTIAL (PRIMARY) HYPERTENSION: ICD-10-CM

## 2025-06-17 DIAGNOSIS — I10 ESSENTIAL HYPERTENSION: ICD-10-CM

## 2025-06-17 DIAGNOSIS — R27.8 DYSGRAPHIA: ICD-10-CM

## 2025-06-17 DIAGNOSIS — E55.9 VITAMIN D DEFICIENCY: ICD-10-CM

## 2025-06-17 LAB
25(OH)D3 SERPL-MCNC: 19.7 NG/ML (ref 30–100)
ALBUMIN SERPL-MCNC: 4.4 G/DL (ref 3.5–5.2)
ALBUMIN/GLOB SERPL: 1.5 {RATIO} (ref 1–2.5)
ALP SERPL-CCNC: 79 U/L (ref 40–129)
ALT SERPL-CCNC: 11 U/L (ref 10–50)
ANION GAP SERPL CALCULATED.3IONS-SCNC: 9 MMOL/L (ref 9–16)
AST SERPL-CCNC: 17 U/L (ref 10–50)
BASOPHILS # BLD: 0.06 K/UL (ref 0–0.2)
BASOPHILS NFR BLD: 1 % (ref 0–2)
BILIRUB SERPL-MCNC: 0.5 MG/DL (ref 0–1.2)
BUN SERPL-MCNC: 15 MG/DL (ref 6–20)
CALCIUM SERPL-MCNC: 9.5 MG/DL (ref 8.6–10.4)
CHLORIDE SERPL-SCNC: 103 MMOL/L (ref 98–107)
CK SERPL-CCNC: 63 U/L (ref 39–308)
CO2 SERPL-SCNC: 25 MMOL/L (ref 20–31)
CREAT SERPL-MCNC: 1 MG/DL (ref 0.7–1.2)
EOSINOPHIL # BLD: 0.27 K/UL (ref 0–0.44)
EOSINOPHILS RELATIVE PERCENT: 3 % (ref 1–4)
ERYTHROCYTE [DISTWIDTH] IN BLOOD BY AUTOMATED COUNT: 13.1 % (ref 11.8–14.4)
FOLATE SERPL-MCNC: 9 NG/ML (ref 4.8–24.2)
GFR, ESTIMATED: 88 ML/MIN/1.73M2
GLUCOSE SERPL-MCNC: 90 MG/DL (ref 74–99)
HCT VFR BLD AUTO: 42.1 % (ref 40.7–50.3)
HGB BLD-MCNC: 14.1 G/DL (ref 13–17)
IMM GRANULOCYTES # BLD AUTO: 0.03 K/UL (ref 0–0.3)
IMM GRANULOCYTES NFR BLD: 0 %
LYMPHOCYTES NFR BLD: 2.48 K/UL (ref 1.1–3.7)
LYMPHOCYTES RELATIVE PERCENT: 29 % (ref 24–43)
MCH RBC QN AUTO: 28.3 PG (ref 25.2–33.5)
MCHC RBC AUTO-ENTMCNC: 33.5 G/DL (ref 28.4–34.8)
MCV RBC AUTO: 84.5 FL (ref 82.6–102.9)
MONOCYTES NFR BLD: 0.56 K/UL (ref 0.1–1.2)
MONOCYTES NFR BLD: 7 % (ref 3–12)
NEUTROPHILS NFR BLD: 60 % (ref 36–65)
NEUTS SEG NFR BLD: 5.15 K/UL (ref 1.5–8.1)
NRBC BLD-RTO: 0 PER 100 WBC
PLATELET # BLD AUTO: 256 K/UL (ref 138–453)
PMV BLD AUTO: 10.3 FL (ref 8.1–13.5)
POTASSIUM SERPL-SCNC: 4.4 MMOL/L (ref 3.7–5.3)
PROT SERPL-MCNC: 7.3 G/DL (ref 6.6–8.7)
RBC # BLD AUTO: 4.98 M/UL (ref 4.21–5.77)
SODIUM SERPL-SCNC: 137 MMOL/L (ref 136–145)
TSH SERPL DL<=0.05 MIU/L-ACNC: 1.88 UIU/ML (ref 0.27–4.2)
VIT B12 SERPL-MCNC: 396 PG/ML (ref 232–1245)
WBC OTHER # BLD: 8.6 K/UL (ref 3.5–11.3)

## 2025-06-17 PROCEDURE — 3074F SYST BP LT 130 MM HG: CPT | Performed by: INTERNAL MEDICINE

## 2025-06-17 PROCEDURE — 3078F DIAST BP <80 MM HG: CPT | Performed by: INTERNAL MEDICINE

## 2025-06-17 PROCEDURE — 99214 OFFICE O/P EST MOD 30 MIN: CPT | Performed by: INTERNAL MEDICINE

## 2025-06-17 NOTE — PROGRESS NOTES
..[facility]   Progress Note        Date of patient's visit: 6/17/2025  Patient's Name:  Leonel Arce Sr.                   YOB: 1965        PCP:  Justice Valle MD    Leonel Arce Sr. is a 59 y.o. male who presents for   Chief Complaint   Patient presents with    Follow-up     Weight check, issues with his writing his name     and follow up of chronic medical problems.  Patient Active Problem List   Diagnosis    Essential hypertension    Sickle cell trait    Primary osteoarthritis of knee    Portal vein thrombosis, june 2017, completed course of warfarin     Gastroesophageal reflux disease without esophagitis    Chronic pain of right ankle    Diverticulosis    Polyp of sigmoid colon    Adenomatous polyp of descending colon    Rectal polyp    History of colon polyps    Alcoholism (HCC)       HISTORY OF PRESENT ILLNESS:    History was obtained from the patient.   59-year-old male here for a routine follow-up.  He has a history of hypertension which is well-controlled.  Patient recently had tooth extractions.  All of his teeth have been removed at this time.  Unfortunately his dental insurance changed and will not fully cover dentures-last visit we were able to give him Ensure to help boost his nutritional needs.  He has gained 5 pounds since his last visit.   Patient stopped drinking alcohol since January and has a place to live now.  Today he reports a concern that for the last few weeks he has noticed there is a weakness in his hand causing him difficulty writing. It is intermittent and not associated to any particular day or time.     Patient's allergies, medications, past medical, surgical, social and family histories were reviewed and updated as appropriate.    ALLERGIES    No Known Allergies      MEDICATIONS:      Current Outpatient Medications   Medication Sig Dispense Refill    ibuprofen (ADVIL;MOTRIN) 800 MG tablet Take 1 tablet by mouth every 8 hours as needed for Pain

## 2025-06-18 ASSESSMENT — ENCOUNTER SYMPTOMS
GASTROINTESTINAL NEGATIVE: 1
RESPIRATORY NEGATIVE: 1

## 2025-07-01 ENCOUNTER — OFFICE VISIT (OUTPATIENT)
Age: 60
End: 2025-07-01
Payer: COMMERCIAL

## 2025-07-01 VITALS
OXYGEN SATURATION: 97 % | TEMPERATURE: 97.5 F | SYSTOLIC BLOOD PRESSURE: 136 MMHG | HEIGHT: 71 IN | WEIGHT: 181.4 LBS | HEART RATE: 59 BPM | DIASTOLIC BLOOD PRESSURE: 68 MMHG | BODY MASS INDEX: 25.4 KG/M2

## 2025-07-01 DIAGNOSIS — E55.9 VITAMIN D DEFICIENCY: ICD-10-CM

## 2025-07-01 DIAGNOSIS — K08.109 NO NATURAL TEETH: ICD-10-CM

## 2025-07-01 DIAGNOSIS — I10 ESSENTIAL (PRIMARY) HYPERTENSION: Primary | ICD-10-CM

## 2025-07-01 DIAGNOSIS — Z72.0 TOBACCO ABUSE: ICD-10-CM

## 2025-07-01 PROCEDURE — 3075F SYST BP GE 130 - 139MM HG: CPT | Performed by: INTERNAL MEDICINE

## 2025-07-01 PROCEDURE — 99213 OFFICE O/P EST LOW 20 MIN: CPT | Performed by: INTERNAL MEDICINE

## 2025-07-01 PROCEDURE — 3078F DIAST BP <80 MM HG: CPT | Performed by: INTERNAL MEDICINE

## 2025-07-01 ASSESSMENT — ENCOUNTER SYMPTOMS
ALLERGIC/IMMUNOLOGIC NEGATIVE: 1
GASTROINTESTINAL NEGATIVE: 1
EYES NEGATIVE: 1
RESPIRATORY NEGATIVE: 1

## 2025-07-01 NOTE — PROGRESS NOTES
No orders of the defined types were placed in this encounter.         Completed Refills               Requested Prescriptions      No prescriptions requested or ordered in this encounter       5. Patient given educational materials - see patient instructions    6. Was a self-tracking handout given in paper form or via [employer]? Yes  If yes, see orders or list here.      No orders of the defined types were placed in this encounter.      No follow-ups on file.    Patient voiced understanding and agreed to treatment plan.     This note is created with the assistance of a speech-recognition program. While intending to generate a document that actually reflects the content of the visit, the document can still have some mistakes which may not have been identified and corrected by editing.      Dr Justice Valle MD, FACP  Associate , Department of Internal Medicine  Resident Ambulatory Site Medical Director  [facility] Internal Medicine  [facility]  Internal Medicine Clerkship - [place]                   7/1/2025, 3:18 PM

## 2025-07-29 ENCOUNTER — TELEPHONE (OUTPATIENT)
Age: 60
End: 2025-07-29

## 2025-08-05 ENCOUNTER — TELEPHONE (OUTPATIENT)
Age: 60
End: 2025-08-05

## (undated) DEVICE — ELECTRODE PT RET AD L9FT HI MOIST COND ADH HYDRGEL CORDED

## (undated) DEVICE — FORCEPS BX L240CM WRK CHN 2.8MM STD CAP W/ NDL MIC MESH

## (undated) DEVICE — 4-PORT MANIFOLD: Brand: NEPTUNE 2

## (undated) DEVICE — CONNECTOR TBNG AUX H2O JET DISP FOR OLY 160/180 SER

## (undated) DEVICE — SPONGE GZ W4XL4IN COT 4 PLY WVN

## (undated) DEVICE — TUBING, SUCTION, 9/32" X 20', STRAIGHT: Brand: MEDLINE INDUSTRIES, INC.

## (undated) DEVICE — BASIN EMSIS 700ML GRAPHITE PLAS KID SHP GRAD

## (undated) DEVICE — GOWN,AURORA,NONRNF,XL,30/CS: Brand: MEDLINE

## (undated) DEVICE — FORCEPS REPROCESS BIOP RADL JAW 4 W/NDL

## (undated) DEVICE — POLYP TRAP: Brand: TRAPEASE®

## (undated) DEVICE — SPONGE GZ W6XL6.75IN FLUF FOR PRE OP PREPPING CLN BULKEE II

## (undated) DEVICE — TRAP SPEC RETRV CLR PLAS POLYP IN LN SUCT QUIK CTCH

## (undated) DEVICE — CLIP LIG L235CM RESOL 360 BX/20

## (undated) DEVICE — Device

## (undated) DEVICE — NEEDLE SCLERO 25GA L240CM OD0.51MM ID0.24MM EXTN L4MM SHTH

## (undated) DEVICE — FLUFF UNDERPAD,MODERATE: Brand: WINGS

## (undated) DEVICE — ADAPTER,CATHETER/SYRINGE/LUER,STERILE: Brand: MEDLINE

## (undated) DEVICE — SPONGE GZ W4XL4IN RAYON POLY FILL CVR W/ NONWOVEN FAB

## (undated) DEVICE — SNARE ENDOSCP L240CM OD2.4MM LOOP W15MM RND INSUL STIFF

## (undated) DEVICE — SNARE ENDOSCP M L240CM LOOP W27MM SHTH DIA2.4MM OVL FLX

## (undated) DEVICE — PERRYSBURG ENDO PACK: Brand: MEDLINE INDUSTRIES, INC.

## (undated) DEVICE — EMESIS BASIN: Brand: DEROYAL

## (undated) DEVICE — Device: Brand: DEFENDO VALVE AND CONNECTOR KIT

## (undated) DEVICE — GOWN,POLY REINFORCED,LG: Brand: MEDLINE

## (undated) DEVICE — BRUSH PRESOAK CLEAN BACTERIOSTATIC DUAL

## (undated) DEVICE — 60 ML SYRINGE LUER-LOCK TIP: Brand: MONOJECT

## (undated) DEVICE — TUBING, SUCTION, 3/16" X 10', STRAIGHT: Brand: MEDLINE

## (undated) DEVICE — Device: Brand: SPOT EX ENDOSCOPIC TATTOO

## (undated) DEVICE — JELLY,LUBE,STERILE,FLIP TOP,TUBE,2-OZ: Brand: MEDLINE

## (undated) DEVICE — SOLUTION IV IRRIG WATER 1000ML POUR BRL 2F7114

## (undated) DEVICE — NEEDLE SCLERO 23 GAX4 MM 1.8 MMX200 CM CNTRST SHTH INTERJECT

## (undated) DEVICE — SYRINGE MED 50ML LUERLOCK TIP